# Patient Record
Sex: FEMALE | Race: WHITE | NOT HISPANIC OR LATINO | URBAN - METROPOLITAN AREA
[De-identification: names, ages, dates, MRNs, and addresses within clinical notes are randomized per-mention and may not be internally consistent; named-entity substitution may affect disease eponyms.]

---

## 2017-05-11 ENCOUNTER — OUTPATIENT (OUTPATIENT)
Dept: OUTPATIENT SERVICES | Facility: HOSPITAL | Age: 67
LOS: 1 days | Discharge: HOME | End: 2017-05-11

## 2017-06-19 ENCOUNTER — OUTPATIENT (OUTPATIENT)
Dept: OUTPATIENT SERVICES | Facility: HOSPITAL | Age: 67
LOS: 1 days | Discharge: HOME | End: 2017-06-19

## 2017-06-19 DIAGNOSIS — E78.00 PURE HYPERCHOLESTEROLEMIA, UNSPECIFIED: ICD-10-CM

## 2017-06-19 DIAGNOSIS — I25.10 ATHEROSCLEROTIC HEART DISEASE OF NATIVE CORONARY ARTERY WITHOUT ANGINA PECTORIS: ICD-10-CM

## 2017-06-19 DIAGNOSIS — I67.1 CEREBRAL ANEURYSM, NONRUPTURED: ICD-10-CM

## 2017-06-19 DIAGNOSIS — Z72.0 TOBACCO USE: ICD-10-CM

## 2017-06-19 DIAGNOSIS — F41.9 ANXIETY DISORDER, UNSPECIFIED: ICD-10-CM

## 2017-06-19 DIAGNOSIS — R07.9 CHEST PAIN, UNSPECIFIED: ICD-10-CM

## 2017-06-28 DIAGNOSIS — F31.32 BIPOLAR DISORDER, CURRENT EPISODE DEPRESSED, MODERATE: ICD-10-CM

## 2017-07-07 ENCOUNTER — OUTPATIENT (OUTPATIENT)
Dept: OUTPATIENT SERVICES | Facility: HOSPITAL | Age: 67
LOS: 1 days | Discharge: HOME | End: 2017-07-07

## 2017-07-07 DIAGNOSIS — Z72.0 TOBACCO USE: ICD-10-CM

## 2017-07-07 DIAGNOSIS — E78.00 PURE HYPERCHOLESTEROLEMIA, UNSPECIFIED: ICD-10-CM

## 2017-07-07 DIAGNOSIS — F41.9 ANXIETY DISORDER, UNSPECIFIED: ICD-10-CM

## 2017-07-07 DIAGNOSIS — F31.32 BIPOLAR DISORDER, CURRENT EPISODE DEPRESSED, MODERATE: ICD-10-CM

## 2017-07-07 DIAGNOSIS — R07.9 CHEST PAIN, UNSPECIFIED: ICD-10-CM

## 2017-07-07 DIAGNOSIS — I67.1 CEREBRAL ANEURYSM, NONRUPTURED: ICD-10-CM

## 2017-07-07 DIAGNOSIS — I25.10 ATHEROSCLEROTIC HEART DISEASE OF NATIVE CORONARY ARTERY WITHOUT ANGINA PECTORIS: ICD-10-CM

## 2017-07-26 ENCOUNTER — OUTPATIENT (OUTPATIENT)
Dept: OUTPATIENT SERVICES | Facility: HOSPITAL | Age: 67
LOS: 1 days | Discharge: HOME | End: 2017-07-26

## 2017-07-26 DIAGNOSIS — R07.9 CHEST PAIN, UNSPECIFIED: ICD-10-CM

## 2017-07-26 DIAGNOSIS — E78.00 PURE HYPERCHOLESTEROLEMIA, UNSPECIFIED: ICD-10-CM

## 2017-07-26 DIAGNOSIS — I67.1 CEREBRAL ANEURYSM, NONRUPTURED: ICD-10-CM

## 2017-07-26 DIAGNOSIS — Z72.0 TOBACCO USE: ICD-10-CM

## 2017-07-26 DIAGNOSIS — F41.9 ANXIETY DISORDER, UNSPECIFIED: ICD-10-CM

## 2017-07-26 DIAGNOSIS — I25.10 ATHEROSCLEROTIC HEART DISEASE OF NATIVE CORONARY ARTERY WITHOUT ANGINA PECTORIS: ICD-10-CM

## 2017-08-10 ENCOUNTER — OUTPATIENT (OUTPATIENT)
Dept: OUTPATIENT SERVICES | Facility: HOSPITAL | Age: 67
LOS: 1 days | Discharge: HOME | End: 2017-08-10

## 2017-08-10 DIAGNOSIS — I67.1 CEREBRAL ANEURYSM, NONRUPTURED: ICD-10-CM

## 2017-08-10 DIAGNOSIS — E78.00 PURE HYPERCHOLESTEROLEMIA, UNSPECIFIED: ICD-10-CM

## 2017-08-10 DIAGNOSIS — F41.9 ANXIETY DISORDER, UNSPECIFIED: ICD-10-CM

## 2017-08-10 DIAGNOSIS — R07.9 CHEST PAIN, UNSPECIFIED: ICD-10-CM

## 2017-08-10 DIAGNOSIS — Z72.0 TOBACCO USE: ICD-10-CM

## 2017-08-10 DIAGNOSIS — F31.32 BIPOLAR DISORDER, CURRENT EPISODE DEPRESSED, MODERATE: ICD-10-CM

## 2017-08-10 DIAGNOSIS — I25.10 ATHEROSCLEROTIC HEART DISEASE OF NATIVE CORONARY ARTERY WITHOUT ANGINA PECTORIS: ICD-10-CM

## 2017-09-11 ENCOUNTER — EMERGENCY (EMERGENCY)
Facility: HOSPITAL | Age: 67
LOS: 0 days | Discharge: HOME | End: 2017-09-11

## 2017-09-11 DIAGNOSIS — Z90.10 ACQUIRED ABSENCE OF UNSPECIFIED BREAST AND NIPPLE: ICD-10-CM

## 2017-09-11 DIAGNOSIS — E78.00 PURE HYPERCHOLESTEROLEMIA, UNSPECIFIED: ICD-10-CM

## 2017-09-11 DIAGNOSIS — R07.9 CHEST PAIN, UNSPECIFIED: ICD-10-CM

## 2017-09-11 DIAGNOSIS — I67.1 CEREBRAL ANEURYSM, NONRUPTURED: ICD-10-CM

## 2017-09-11 DIAGNOSIS — Z86.73 PERSONAL HISTORY OF TRANSIENT ISCHEMIC ATTACK (TIA), AND CEREBRAL INFARCTION WITHOUT RESIDUAL DEFICITS: ICD-10-CM

## 2017-09-11 DIAGNOSIS — I25.10 ATHEROSCLEROTIC HEART DISEASE OF NATIVE CORONARY ARTERY WITHOUT ANGINA PECTORIS: ICD-10-CM

## 2017-09-11 DIAGNOSIS — F41.9 ANXIETY DISORDER, UNSPECIFIED: ICD-10-CM

## 2017-09-11 DIAGNOSIS — Z87.891 PERSONAL HISTORY OF NICOTINE DEPENDENCE: ICD-10-CM

## 2017-09-11 DIAGNOSIS — Z72.0 TOBACCO USE: ICD-10-CM

## 2017-09-11 DIAGNOSIS — R10.30 LOWER ABDOMINAL PAIN, UNSPECIFIED: ICD-10-CM

## 2017-09-11 DIAGNOSIS — E11.9 TYPE 2 DIABETES MELLITUS WITHOUT COMPLICATIONS: ICD-10-CM

## 2017-09-11 DIAGNOSIS — Z79.82 LONG TERM (CURRENT) USE OF ASPIRIN: ICD-10-CM

## 2017-09-11 DIAGNOSIS — F32.9 MAJOR DEPRESSIVE DISORDER, SINGLE EPISODE, UNSPECIFIED: ICD-10-CM

## 2017-09-11 DIAGNOSIS — I10 ESSENTIAL (PRIMARY) HYPERTENSION: ICD-10-CM

## 2017-09-11 DIAGNOSIS — Z79.899 OTHER LONG TERM (CURRENT) DRUG THERAPY: ICD-10-CM

## 2017-10-05 ENCOUNTER — EMERGENCY (EMERGENCY)
Facility: HOSPITAL | Age: 67
LOS: 0 days | Discharge: HOME | End: 2017-10-05

## 2017-10-05 DIAGNOSIS — E11.9 TYPE 2 DIABETES MELLITUS WITHOUT COMPLICATIONS: ICD-10-CM

## 2017-10-05 DIAGNOSIS — Z86.73 PERSONAL HISTORY OF TRANSIENT ISCHEMIC ATTACK (TIA), AND CEREBRAL INFARCTION WITHOUT RESIDUAL DEFICITS: ICD-10-CM

## 2017-10-05 DIAGNOSIS — F41.9 ANXIETY DISORDER, UNSPECIFIED: ICD-10-CM

## 2017-10-05 DIAGNOSIS — Z87.891 PERSONAL HISTORY OF NICOTINE DEPENDENCE: ICD-10-CM

## 2017-10-05 DIAGNOSIS — Z90.10 ACQUIRED ABSENCE OF UNSPECIFIED BREAST AND NIPPLE: ICD-10-CM

## 2017-10-05 DIAGNOSIS — F32.9 MAJOR DEPRESSIVE DISORDER, SINGLE EPISODE, UNSPECIFIED: ICD-10-CM

## 2017-10-05 DIAGNOSIS — R56.9 UNSPECIFIED CONVULSIONS: ICD-10-CM

## 2017-10-05 DIAGNOSIS — R07.9 CHEST PAIN, UNSPECIFIED: ICD-10-CM

## 2017-10-05 DIAGNOSIS — I25.10 ATHEROSCLEROTIC HEART DISEASE OF NATIVE CORONARY ARTERY WITHOUT ANGINA PECTORIS: ICD-10-CM

## 2017-10-05 DIAGNOSIS — R10.32 LEFT LOWER QUADRANT PAIN: ICD-10-CM

## 2017-10-05 DIAGNOSIS — E78.00 PURE HYPERCHOLESTEROLEMIA, UNSPECIFIED: ICD-10-CM

## 2017-10-05 DIAGNOSIS — K57.32 DIVERTICULITIS OF LARGE INTESTINE WITHOUT PERFORATION OR ABSCESS WITHOUT BLEEDING: ICD-10-CM

## 2017-10-05 DIAGNOSIS — Z79.899 OTHER LONG TERM (CURRENT) DRUG THERAPY: ICD-10-CM

## 2017-10-05 DIAGNOSIS — Z79.82 LONG TERM (CURRENT) USE OF ASPIRIN: ICD-10-CM

## 2017-10-05 DIAGNOSIS — Z90.710 ACQUIRED ABSENCE OF BOTH CERVIX AND UTERUS: ICD-10-CM

## 2017-10-05 DIAGNOSIS — Z72.0 TOBACCO USE: ICD-10-CM

## 2017-10-05 DIAGNOSIS — I67.1 CEREBRAL ANEURYSM, NONRUPTURED: ICD-10-CM

## 2017-10-05 DIAGNOSIS — I10 ESSENTIAL (PRIMARY) HYPERTENSION: ICD-10-CM

## 2017-10-06 ENCOUNTER — EMERGENCY (EMERGENCY)
Facility: HOSPITAL | Age: 67
LOS: 0 days | Discharge: HOME | End: 2017-10-07

## 2017-10-06 DIAGNOSIS — I10 ESSENTIAL (PRIMARY) HYPERTENSION: ICD-10-CM

## 2017-10-06 DIAGNOSIS — Z72.0 TOBACCO USE: ICD-10-CM

## 2017-10-06 DIAGNOSIS — E78.00 PURE HYPERCHOLESTEROLEMIA, UNSPECIFIED: ICD-10-CM

## 2017-10-06 DIAGNOSIS — Z87.891 PERSONAL HISTORY OF NICOTINE DEPENDENCE: ICD-10-CM

## 2017-10-06 DIAGNOSIS — Z79.899 OTHER LONG TERM (CURRENT) DRUG THERAPY: ICD-10-CM

## 2017-10-06 DIAGNOSIS — R07.9 CHEST PAIN, UNSPECIFIED: ICD-10-CM

## 2017-10-06 DIAGNOSIS — I25.10 ATHEROSCLEROTIC HEART DISEASE OF NATIVE CORONARY ARTERY WITHOUT ANGINA PECTORIS: ICD-10-CM

## 2017-10-06 DIAGNOSIS — F41.8 OTHER SPECIFIED ANXIETY DISORDERS: ICD-10-CM

## 2017-10-06 DIAGNOSIS — I67.1 CEREBRAL ANEURYSM, NONRUPTURED: ICD-10-CM

## 2017-10-06 DIAGNOSIS — Z90.10 ACQUIRED ABSENCE OF UNSPECIFIED BREAST AND NIPPLE: ICD-10-CM

## 2017-10-06 DIAGNOSIS — R10.32 LEFT LOWER QUADRANT PAIN: ICD-10-CM

## 2017-10-06 DIAGNOSIS — E11.9 TYPE 2 DIABETES MELLITUS WITHOUT COMPLICATIONS: ICD-10-CM

## 2017-10-06 DIAGNOSIS — K57.92 DIVERTICULITIS OF INTESTINE, PART UNSPECIFIED, WITHOUT PERFORATION OR ABSCESS WITHOUT BLEEDING: ICD-10-CM

## 2017-10-06 DIAGNOSIS — Z79.2 LONG TERM (CURRENT) USE OF ANTIBIOTICS: ICD-10-CM

## 2017-10-06 DIAGNOSIS — F41.9 ANXIETY DISORDER, UNSPECIFIED: ICD-10-CM

## 2017-10-06 DIAGNOSIS — Z79.82 LONG TERM (CURRENT) USE OF ASPIRIN: ICD-10-CM

## 2017-10-09 ENCOUNTER — EMERGENCY (EMERGENCY)
Facility: HOSPITAL | Age: 67
LOS: 0 days | Discharge: LEFT BEFORE DISCHARGE | End: 2017-10-09

## 2017-10-09 DIAGNOSIS — Z72.0 TOBACCO USE: ICD-10-CM

## 2017-10-09 DIAGNOSIS — I25.10 ATHEROSCLEROTIC HEART DISEASE OF NATIVE CORONARY ARTERY WITHOUT ANGINA PECTORIS: ICD-10-CM

## 2017-10-09 DIAGNOSIS — R10.32 LEFT LOWER QUADRANT PAIN: ICD-10-CM

## 2017-10-09 DIAGNOSIS — Z79.899 OTHER LONG TERM (CURRENT) DRUG THERAPY: ICD-10-CM

## 2017-10-09 DIAGNOSIS — E78.00 PURE HYPERCHOLESTEROLEMIA, UNSPECIFIED: ICD-10-CM

## 2017-10-09 DIAGNOSIS — I10 ESSENTIAL (PRIMARY) HYPERTENSION: ICD-10-CM

## 2017-10-09 DIAGNOSIS — Z87.891 PERSONAL HISTORY OF NICOTINE DEPENDENCE: ICD-10-CM

## 2017-10-09 DIAGNOSIS — E11.9 TYPE 2 DIABETES MELLITUS WITHOUT COMPLICATIONS: ICD-10-CM

## 2017-10-09 DIAGNOSIS — Z79.2 LONG TERM (CURRENT) USE OF ANTIBIOTICS: ICD-10-CM

## 2017-10-09 DIAGNOSIS — Z79.82 LONG TERM (CURRENT) USE OF ASPIRIN: ICD-10-CM

## 2017-10-09 DIAGNOSIS — R07.9 CHEST PAIN, UNSPECIFIED: ICD-10-CM

## 2017-10-09 DIAGNOSIS — Z79.84 LONG TERM (CURRENT) USE OF ORAL HYPOGLYCEMIC DRUGS: ICD-10-CM

## 2017-10-09 DIAGNOSIS — R19.7 DIARRHEA, UNSPECIFIED: ICD-10-CM

## 2017-10-09 DIAGNOSIS — Z98.890 OTHER SPECIFIED POSTPROCEDURAL STATES: ICD-10-CM

## 2017-10-09 DIAGNOSIS — F41.9 ANXIETY DISORDER, UNSPECIFIED: ICD-10-CM

## 2017-10-09 DIAGNOSIS — I67.1 CEREBRAL ANEURYSM, NONRUPTURED: ICD-10-CM

## 2018-02-23 DIAGNOSIS — F31.32 BIPOLAR DISORDER, CURRENT EPISODE DEPRESSED, MODERATE: ICD-10-CM

## 2018-04-11 ENCOUNTER — EMERGENCY (EMERGENCY)
Facility: HOSPITAL | Age: 68
LOS: 0 days | Discharge: HOME | End: 2018-04-12
Attending: EMERGENCY MEDICINE

## 2018-04-11 VITALS
SYSTOLIC BLOOD PRESSURE: 137 MMHG | TEMPERATURE: 96 F | OXYGEN SATURATION: 95 % | RESPIRATION RATE: 18 BRPM | HEART RATE: 73 BPM | DIASTOLIC BLOOD PRESSURE: 90 MMHG | WEIGHT: 214.95 LBS | HEIGHT: 72 IN

## 2018-04-11 DIAGNOSIS — R42 DIZZINESS AND GIDDINESS: ICD-10-CM

## 2018-04-11 DIAGNOSIS — Z90.710 ACQUIRED ABSENCE OF BOTH CERVIX AND UTERUS: ICD-10-CM

## 2018-04-11 DIAGNOSIS — Z98.890 OTHER SPECIFIED POSTPROCEDURAL STATES: Chronic | ICD-10-CM

## 2018-04-11 DIAGNOSIS — F17.210 NICOTINE DEPENDENCE, CIGARETTES, UNCOMPLICATED: ICD-10-CM

## 2018-04-11 DIAGNOSIS — Z90.12 ACQUIRED ABSENCE OF LEFT BREAST AND NIPPLE: ICD-10-CM

## 2018-04-11 LAB
ALBUMIN SERPL ELPH-MCNC: 4.3 G/DL — SIGNIFICANT CHANGE UP (ref 3.5–5.2)
ALP SERPL-CCNC: 68 U/L — SIGNIFICANT CHANGE UP (ref 30–115)
ALT FLD-CCNC: 33 U/L — SIGNIFICANT CHANGE UP (ref 0–41)
ANION GAP SERPL CALC-SCNC: 16 MMOL/L — HIGH (ref 7–14)
APTT BLD: 30 SEC — SIGNIFICANT CHANGE UP (ref 27–39.2)
AST SERPL-CCNC: 33 U/L — SIGNIFICANT CHANGE UP (ref 0–41)
BASOPHILS # BLD AUTO: 0.05 K/UL — SIGNIFICANT CHANGE UP (ref 0–0.2)
BASOPHILS NFR BLD AUTO: 0.5 % — SIGNIFICANT CHANGE UP (ref 0–1)
BILIRUB SERPL-MCNC: 0.4 MG/DL — SIGNIFICANT CHANGE UP (ref 0.2–1.2)
BUN SERPL-MCNC: 14 MG/DL — SIGNIFICANT CHANGE UP (ref 10–20)
CALCIUM SERPL-MCNC: 9.3 MG/DL — SIGNIFICANT CHANGE UP (ref 8.5–10.1)
CHLORIDE SERPL-SCNC: 102 MMOL/L — SIGNIFICANT CHANGE UP (ref 98–110)
CO2 SERPL-SCNC: 25 MMOL/L — SIGNIFICANT CHANGE UP (ref 17–32)
CREAT SERPL-MCNC: 0.7 MG/DL — SIGNIFICANT CHANGE UP (ref 0.7–1.5)
EOSINOPHIL # BLD AUTO: 0.26 K/UL — SIGNIFICANT CHANGE UP (ref 0–0.7)
EOSINOPHIL NFR BLD AUTO: 2.8 % — SIGNIFICANT CHANGE UP (ref 0–8)
GLUCOSE SERPL-MCNC: 196 MG/DL — HIGH (ref 70–99)
HCT VFR BLD CALC: 43.1 % — SIGNIFICANT CHANGE UP (ref 37–47)
HGB BLD-MCNC: 14.4 G/DL — SIGNIFICANT CHANGE UP (ref 12–16)
IMM GRANULOCYTES NFR BLD AUTO: 0.7 % — HIGH (ref 0.1–0.3)
INR BLD: 1.1 RATIO — SIGNIFICANT CHANGE UP (ref 0.65–1.3)
LIDOCAIN IGE QN: 19 U/L — SIGNIFICANT CHANGE UP (ref 7–60)
LYMPHOCYTES # BLD AUTO: 2.17 K/UL — SIGNIFICANT CHANGE UP (ref 1.2–3.4)
LYMPHOCYTES # BLD AUTO: 23.2 % — SIGNIFICANT CHANGE UP (ref 20.5–51.1)
MCHC RBC-ENTMCNC: 30.3 PG — SIGNIFICANT CHANGE UP (ref 27–31)
MCHC RBC-ENTMCNC: 33.4 G/DL — SIGNIFICANT CHANGE UP (ref 32–37)
MCV RBC AUTO: 90.7 FL — SIGNIFICANT CHANGE UP (ref 81–99)
MONOCYTES # BLD AUTO: 0.56 K/UL — SIGNIFICANT CHANGE UP (ref 0.1–0.6)
MONOCYTES NFR BLD AUTO: 6 % — SIGNIFICANT CHANGE UP (ref 1.7–9.3)
NEUTROPHILS # BLD AUTO: 6.23 K/UL — SIGNIFICANT CHANGE UP (ref 1.4–6.5)
NEUTROPHILS NFR BLD AUTO: 66.8 % — SIGNIFICANT CHANGE UP (ref 42.2–75.2)
NRBC # BLD: 0 /100 WBCS — SIGNIFICANT CHANGE UP (ref 0–0)
PLATELET # BLD AUTO: 136 K/UL — SIGNIFICANT CHANGE UP (ref 130–400)
POTASSIUM SERPL-MCNC: 4.2 MMOL/L — SIGNIFICANT CHANGE UP (ref 3.5–5)
POTASSIUM SERPL-SCNC: 4.2 MMOL/L — SIGNIFICANT CHANGE UP (ref 3.5–5)
PROT SERPL-MCNC: 6.9 G/DL — SIGNIFICANT CHANGE UP (ref 6–8)
PROTHROM AB SERPL-ACNC: 11.9 SEC — SIGNIFICANT CHANGE UP (ref 9.95–12.87)
RBC # BLD: 4.75 M/UL — SIGNIFICANT CHANGE UP (ref 4.2–5.4)
RBC # FLD: 13.8 % — SIGNIFICANT CHANGE UP (ref 11.5–14.5)
SODIUM SERPL-SCNC: 143 MMOL/L — SIGNIFICANT CHANGE UP (ref 135–146)
TROPONIN T SERPL-MCNC: <0.01 NG/ML — SIGNIFICANT CHANGE UP
WBC # BLD: 9.34 K/UL — SIGNIFICANT CHANGE UP (ref 4.8–10.8)
WBC # FLD AUTO: 9.34 K/UL — SIGNIFICANT CHANGE UP (ref 4.8–10.8)

## 2018-04-11 RX ORDER — MECLIZINE HCL 12.5 MG
25 TABLET ORAL ONCE
Qty: 0 | Refills: 0 | Status: COMPLETED | OUTPATIENT
Start: 2018-04-11 | End: 2018-04-11

## 2018-04-11 RX ADMIN — Medication 25 MILLIGRAM(S): at 21:43

## 2018-04-11 NOTE — ED PROVIDER NOTE - OBJECTIVE STATEMENT
this is 66 yo male presents to ed for evaluation of dizziness with nausea and vomiting . patient states she is being followed by neurologist for frequent headaches  and had mri. patient went to neurologist today for follow up visit after her MRI. patient was told she had mild dementia and was put on medicine for dementia. patient states she took that medicine for first time tonight . about 1 hour after she started to sweat with nausea and then began vomiting. Vyclone call 911 . patient is denying a headache at this time. no chest pain no shortness of breath. no abdominal pain .

## 2018-04-11 NOTE — ED ADULT TRIAGE NOTE - CHIEF COMPLAINT QUOTE
pt started new medication ( Namzarix ) prescribed by neurologist , pt several hours later after eating chocolate started to throw up and got the spins. pt c/o of weakness.

## 2018-04-11 NOTE — ED PROVIDER NOTE - PROGRESS NOTE DETAILS
I personally evaluated the patient. I reviewed the Resident’s or Physician Assistant’s note (as assigned above), and agree with the findings and plan except as documented in my note.  67yF with hx of DM, Breast CA, left mastectomy presents to ED for dizziness with nausea and vomiting.  Pt states that while sitting on her couch she developed dizziness described as “being on a ship.”  Pt then developed feelings of nausea, tried to find a comfortable position and with moving dizziness worsened.  Pt then vomited multiple times, contacted emergency services.  Denies any CP, SOB, abd pain, LOC, headhace, numbness, tingling, neck pain.  ON EXAM:  Pt is well appearing, awake and alert.  PERRL, EOMI.  No nystagmus.  No schew.  CVS RRR.  Resp CTA b/l.  abd soft nt/nd.  No LE edema. PLAN:  Labs, EKG, CXray, CT head, IVF, analgesia, and re eval. Pt feels much better no longer dizzy   able  to  sit up  walk around  asymptomatic -  pt  wants to g elijah e-  pt has close follow up with her physician s and her neurologist   results  discussed and printed for patient -   urged to return to ED  for any new worsens ing  or concerning symptoms to her .  will dc in stabel condition Pt feels much better no longer dizzy   able  to  sit up  walk around  asymptomatic -  pt  wants to g elijah e-  pt has close follow up with her physician s and her neurologist   results  discussed and printed for patient -   urged to return to ED  for any new worsens ing  or concerning symptoms to her .  will dc in stabel condition (Pt is already aware of her previous  strokes -  as she has been seen and taken care of by her neurologist

## 2018-04-11 NOTE — ED PROVIDER NOTE - PHYSICAL EXAMINATION
--EXAM--  VITAL SIGNS: I have reviewed vs documented at present.  CONSTITUTIONAL: Well-developed; well-nourished; in no acute distress.   SKIN: Warm and dry, no acute rash.   HEAD: Normocephalic; atraumatic.  EYES: PERRL, EOM intact; conjunctiva and sclera clear. No nystagmus.  ENT: No nasal discharge; airway clear.  NECK: Supple; non tender.  CARD: S1, S2, Regular rate and rhythm.   RESP: No wheezes, rales or rhonchi.  ABD: Normal bowel sounds; soft; non-distended; non-tender.  EXT: Normal ROM.   NEURO: Alert, oriented, grossly unremarkable. Strength 5/5 in all extremities. Sensation intact throughout.  PSYCH: Cooperative, appropriate.

## 2018-04-11 NOTE — ED PROVIDER NOTE - NS ED ROS FT
Review of Systems:  	•	CONSTITUTIONAL - no fever, no diaphoresis, no chills  	•	SKIN - no rash  	•	HEMATOLOGIC - no bleeding, no bruising  	•	EYES - no eye pain, no blurry vision  	•	ENT - no change in hearing, no sore throat, no ear pain or tinnitus  	•	RESPIRATORY - no shortness of breath, no cough  	•	CARDIAC - no chest pain, no palpitations  	•	GI - no abd pain, no nausea, no vomiting, no diarrhea, no constipation  	•	GENITO-URINARY - no discharge, no dysuria; no hematuria, no increased urinary frequency  	•	MUSCULOSKELETAL - no joint paint, no swelling, no redness  	•	NEUROLOGIC -dizziness and weakness, no headache, no paresthesias, no LOC  	•	PSYCH - no anxiety, non suicidal, non homicidal, no hallucination, no depression

## 2018-04-12 VITALS
TEMPERATURE: 97 F | SYSTOLIC BLOOD PRESSURE: 140 MMHG | HEART RATE: 77 BPM | OXYGEN SATURATION: 96 % | RESPIRATION RATE: 18 BRPM | DIASTOLIC BLOOD PRESSURE: 84 MMHG

## 2018-04-12 RX ORDER — METOCLOPRAMIDE HCL 10 MG
10 TABLET ORAL ONCE
Qty: 0 | Refills: 0 | Status: COMPLETED | OUTPATIENT
Start: 2018-04-12 | End: 2018-04-12

## 2018-04-12 RX ORDER — ASPIRIN/CALCIUM CARB/MAGNESIUM 324 MG
325 TABLET ORAL ONCE
Qty: 0 | Refills: 0 | Status: COMPLETED | OUTPATIENT
Start: 2018-04-12 | End: 2018-04-12

## 2018-04-12 RX ADMIN — Medication 10 MILLIGRAM(S): at 00:17

## 2018-04-12 NOTE — ED ADULT NURSE REASSESSMENT NOTE - NS ED NURSE REASSESS COMMENT FT1
Pt vomit episode x1 on her way back from CT testing; medication given as per MD orders; will continue to assess.

## 2019-01-01 ENCOUNTER — INPATIENT (INPATIENT)
Facility: HOSPITAL | Age: 69
LOS: 16 days | Discharge: NURSING, REHAB OR LONG-TERM CARE | End: 2019-01-18
Attending: INTERNAL MEDICINE | Admitting: INTERNAL MEDICINE

## 2019-01-01 VITALS — WEIGHT: 199.96 LBS

## 2019-01-01 DIAGNOSIS — F03.90 UNSPECIFIED DEMENTIA WITHOUT BEHAVIORAL DISTURBANCE: ICD-10-CM

## 2019-01-01 DIAGNOSIS — D64.9 ANEMIA, UNSPECIFIED: ICD-10-CM

## 2019-01-01 DIAGNOSIS — Z98.890 OTHER SPECIFIED POSTPROCEDURAL STATES: Chronic | ICD-10-CM

## 2019-01-01 DIAGNOSIS — I67.1 CEREBRAL ANEURYSM, NONRUPTURED: ICD-10-CM

## 2019-01-01 DIAGNOSIS — F32.9 MAJOR DEPRESSIVE DISORDER, SINGLE EPISODE, UNSPECIFIED: ICD-10-CM

## 2019-01-01 DIAGNOSIS — R56.9 UNSPECIFIED CONVULSIONS: ICD-10-CM

## 2019-01-01 DIAGNOSIS — C50.912 MALIGNANT NEOPLASM OF UNSPECIFIED SITE OF LEFT FEMALE BREAST: ICD-10-CM

## 2019-01-01 DIAGNOSIS — E78.5 HYPERLIPIDEMIA, UNSPECIFIED: ICD-10-CM

## 2019-01-01 PROBLEM — C50.919 MALIGNANT NEOPLASM OF UNSPECIFIED SITE OF UNSPECIFIED FEMALE BREAST: Chronic | Status: ACTIVE | Noted: 2018-04-11

## 2019-01-01 LAB
ALBUMIN SERPL ELPH-MCNC: 3.9 G/DL — SIGNIFICANT CHANGE UP (ref 3.5–5.2)
ALLERGY+IMMUNOLOGY DIAG STUDY NOTE: SIGNIFICANT CHANGE UP
ALP SERPL-CCNC: 70 U/L — SIGNIFICANT CHANGE UP (ref 30–115)
ALT FLD-CCNC: 13 U/L — SIGNIFICANT CHANGE UP (ref 0–41)
ANION GAP SERPL CALC-SCNC: 10 MMOL/L — SIGNIFICANT CHANGE UP (ref 7–14)
ANISOCYTOSIS BLD QL: SIGNIFICANT CHANGE UP
AST SERPL-CCNC: 27 U/L — SIGNIFICANT CHANGE UP (ref 0–41)
BASOPHILS # BLD AUTO: 0 K/UL — SIGNIFICANT CHANGE UP (ref 0–0.2)
BASOPHILS NFR BLD AUTO: 0 % — SIGNIFICANT CHANGE UP (ref 0–1)
BILIRUB SERPL-MCNC: 0.3 MG/DL — SIGNIFICANT CHANGE UP (ref 0.2–1.2)
BUN SERPL-MCNC: 10 MG/DL — SIGNIFICANT CHANGE UP (ref 10–20)
CALCIUM SERPL-MCNC: 9.4 MG/DL — SIGNIFICANT CHANGE UP (ref 8.5–10.1)
CHLORIDE SERPL-SCNC: 103 MMOL/L — SIGNIFICANT CHANGE UP (ref 98–110)
CO2 SERPL-SCNC: 27 MMOL/L — SIGNIFICANT CHANGE UP (ref 17–32)
CREAT SERPL-MCNC: 0.7 MG/DL — SIGNIFICANT CHANGE UP (ref 0.7–1.5)
EOSINOPHIL # BLD AUTO: 0.07 K/UL — SIGNIFICANT CHANGE UP (ref 0–0.7)
EOSINOPHIL NFR BLD AUTO: 1 % — SIGNIFICANT CHANGE UP (ref 0–8)
GLUCOSE SERPL-MCNC: 195 MG/DL — HIGH (ref 70–99)
HCT VFR BLD CALC: 22 % — LOW (ref 37–47)
HGB BLD-MCNC: 5.9 G/DL — CRITICAL LOW (ref 12–16)
HYPOCHROMIA BLD QL: SIGNIFICANT CHANGE UP
LIDOCAIN IGE QN: 17 U/L — SIGNIFICANT CHANGE UP (ref 7–60)
LYMPHOCYTES # BLD AUTO: 1 K/UL — LOW (ref 1.2–3.4)
LYMPHOCYTES # BLD AUTO: 15 % — LOW (ref 20.5–51.1)
MANUAL SMEAR VERIFICATION: SIGNIFICANT CHANGE UP
MCHC RBC-ENTMCNC: 19.5 PG — LOW (ref 27–31)
MCHC RBC-ENTMCNC: 26.8 G/DL — LOW (ref 32–37)
MCV RBC AUTO: 72.6 FL — LOW (ref 81–99)
MICROCYTES BLD QL: SIGNIFICANT CHANGE UP
MONOCYTES # BLD AUTO: 0.33 K/UL — SIGNIFICANT CHANGE UP (ref 0.1–0.6)
MONOCYTES NFR BLD AUTO: 5 % — SIGNIFICANT CHANGE UP (ref 1.7–9.3)
NEUTROPHILS # BLD AUTO: 5.27 K/UL — SIGNIFICANT CHANGE UP (ref 1.4–6.5)
NEUTROPHILS NFR BLD AUTO: 78 % — HIGH (ref 42.2–75.2)
NEUTS BAND # BLD: 1 % — SIGNIFICANT CHANGE UP (ref 0–6)
NRBC # BLD: 0 /100 — SIGNIFICANT CHANGE UP (ref 0–0)
NRBC # BLD: SIGNIFICANT CHANGE UP /100 WBCS (ref 0–0)
PLAT MORPH BLD: NORMAL — SIGNIFICANT CHANGE UP
PLATELET # BLD AUTO: 157 K/UL — SIGNIFICANT CHANGE UP (ref 130–400)
POTASSIUM SERPL-MCNC: 4.2 MMOL/L — SIGNIFICANT CHANGE UP (ref 3.5–5)
POTASSIUM SERPL-SCNC: 4.2 MMOL/L — SIGNIFICANT CHANGE UP (ref 3.5–5)
PROT SERPL-MCNC: 6.8 G/DL — SIGNIFICANT CHANGE UP (ref 6–8)
RBC # BLD: 3.03 M/UL — LOW (ref 4.2–5.4)
RBC # FLD: 18 % — HIGH (ref 11.5–14.5)
RBC BLD AUTO: ABNORMAL
SODIUM SERPL-SCNC: 140 MMOL/L — SIGNIFICANT CHANGE UP (ref 135–146)
TROPONIN T SERPL-MCNC: <0.01 NG/ML — SIGNIFICANT CHANGE UP
TYPE + AB SCN PNL BLD: SIGNIFICANT CHANGE UP
WBC # BLD: 6.67 K/UL — SIGNIFICANT CHANGE UP (ref 4.8–10.8)
WBC # FLD AUTO: 6.67 K/UL — SIGNIFICANT CHANGE UP (ref 4.8–10.8)

## 2019-01-01 RX ORDER — ESCITALOPRAM OXALATE 10 MG/1
10 TABLET, FILM COATED ORAL DAILY
Qty: 0 | Refills: 0 | Status: DISCONTINUED | OUTPATIENT
Start: 2019-01-01 | End: 2019-01-18

## 2019-01-01 RX ORDER — LAMOTRIGINE 25 MG/1
100 TABLET, ORALLY DISINTEGRATING ORAL
Qty: 0 | Refills: 0 | Status: DISCONTINUED | OUTPATIENT
Start: 2019-01-01 | End: 2019-01-18

## 2019-01-01 RX ORDER — SIMVASTATIN 20 MG/1
20 TABLET, FILM COATED ORAL AT BEDTIME
Qty: 0 | Refills: 0 | Status: DISCONTINUED | OUTPATIENT
Start: 2019-01-01 | End: 2019-01-18

## 2019-01-01 RX ORDER — SODIUM CHLORIDE 9 MG/ML
1000 INJECTION INTRAMUSCULAR; INTRAVENOUS; SUBCUTANEOUS ONCE
Qty: 0 | Refills: 0 | Status: COMPLETED | OUTPATIENT
Start: 2019-01-01 | End: 2019-01-01

## 2019-01-01 RX ORDER — SODIUM CHLORIDE 9 MG/ML
1000 INJECTION INTRAMUSCULAR; INTRAVENOUS; SUBCUTANEOUS
Qty: 0 | Refills: 0 | Status: DISCONTINUED | OUTPATIENT
Start: 2019-01-01 | End: 2019-01-06

## 2019-01-01 RX ORDER — QUETIAPINE FUMARATE 200 MG/1
25 TABLET, FILM COATED ORAL AT BEDTIME
Qty: 0 | Refills: 0 | Status: DISCONTINUED | OUTPATIENT
Start: 2019-01-01 | End: 2019-01-18

## 2019-01-01 RX ORDER — MEMANTINE HYDROCHLORIDE 10 MG/1
10 TABLET ORAL
Qty: 0 | Refills: 0 | Status: DISCONTINUED | OUTPATIENT
Start: 2019-01-01 | End: 2019-01-18

## 2019-01-01 RX ADMIN — SODIUM CHLORIDE 1000 MILLILITER(S): 9 INJECTION INTRAMUSCULAR; INTRAVENOUS; SUBCUTANEOUS at 11:13

## 2019-01-01 RX ADMIN — QUETIAPINE FUMARATE 25 MILLIGRAM(S): 200 TABLET, FILM COATED ORAL at 22:02

## 2019-01-01 RX ADMIN — SODIUM CHLORIDE 75 MILLILITER(S): 9 INJECTION INTRAMUSCULAR; INTRAVENOUS; SUBCUTANEOUS at 22:05

## 2019-01-01 RX ADMIN — LAMOTRIGINE 100 MILLIGRAM(S): 25 TABLET, ORALLY DISINTEGRATING ORAL at 18:05

## 2019-01-01 RX ADMIN — MEMANTINE HYDROCHLORIDE 10 MILLIGRAM(S): 10 TABLET ORAL at 18:05

## 2019-01-01 RX ADMIN — SIMVASTATIN 20 MILLIGRAM(S): 20 TABLET, FILM COATED ORAL at 22:02

## 2019-01-01 NOTE — ED ADULT NURSE REASSESSMENT NOTE - NS ED NURSE REASSESS COMMENT FT1
Pt has been admitted to med/surg for further observation/treatment. Pt needs blood transfusion for Hgb 5.9/Hct 22.0. Two units of PRBCs have been ordered. Blood is ready but patient has bed available on [address], room 431-1. Verbal report has been given to receiving RNBrook. Pt will receive blood once she arrives on the unit. Dr. Ina david as well as Medical DALI Hill. Pt is hemodynamically stable with blood pressure 118/59; Heart rate 84. Await arrival of transport.

## 2019-01-01 NOTE — H&P ADULT - ASSESSMENT
67 yo female admitted after possible syncope vs seizure, found on floor by daughter                  D/W Dr Stanford

## 2019-01-01 NOTE — H&P ADULT - HISTORY OF PRESENT ILLNESS
67 yo female with h/o Dementia who lives alone presents after daughter found her on bathroom floor. As per pts daughter she seemed more confused and had some slurred speech when she found her. She was moaning but not complaining of any specific pain.     Daughter has limited information on pts hx because she has only started speaking to her in the last 2 months, but had been astranged for 7 years prior. Pt lived with her  who abandoned her in august of this year as per pts daughter.     Pt now offers no complaints. Denies pain, n/v, weakness, CP, SOB, dizziness or any bleeding.

## 2019-01-01 NOTE — ED PROVIDER NOTE - PMH
Brain aneurysm    Breast cancer in female    Dementia    Depression, unspecified depression type    Hyperlipidemia, unspecified hyperlipidemia type    Seizures

## 2019-01-01 NOTE — H&P ADULT - ATTENDING COMMENTS
pt seen and examined independently, I have read and agree with above exam and poa,    pt with possible syncope or seizure      anemis    Gi eval    neuro eval    EEg

## 2019-01-01 NOTE — ED PROVIDER NOTE - PHYSICAL EXAMINATION
CONST: Well appearing in NAD  EYES: PERRL, EOMI, Sclera and conjunctiva clear.  ENT: No nasal discharge. TM's clear B/L without drainage. Oropharynx normal appearing, no erythema or exudates. No abscess or swelling. Uvula midline. No temporal artery or mastoid tenderness.  NECK: Non-tender, no meningeal signs.   CARD: Normal S1 S2; Normal rate and rhythm  RESP: Equal BS B/L, No wheezes, rhonchi or rales. No distress  GI: Soft, non-tender, non-distended. no cva tenderness. normal BS  RECTUM: brown stool, no blood  MS: Normal ROM in all extremities. No midline spinal tenderness. pulses 2 +. no calf tenderness or swelling  SKIN: Warm, dry, no acute rashes. Good turgor  NEURO: A&Ox2, No focal deficits. Strength 5/5 with no sensory deficits. Finger to nose intact. Negative pronator drift

## 2019-01-01 NOTE — ED PROVIDER NOTE - MEDICAL DECISION MAKING DETAILS
I was present for and supervised the key and critical aspects of the procedures performed during the care of the patient. Patient cannot contribute to history at this time secondary to dementia she was found by her daughter "passed out" on the bathroom floor the patient was at baseline menta status by the time the patient's daughter reached her.  Now she is back to baseline on physical exam she is nc/at perrla eomi oropharynx clear cta b/l, rrr s1s2 noted abd-soft nt nd bs+ ext from with no focal deficits though she is baseline confused.  patient is pale appearing.  rectal reveals brown stool no active bleeding.    A/P- patient was found to be anemic requiring transfusion which was initiated. Patient admitted for further management

## 2019-01-01 NOTE — ED PROVIDER NOTE - OBJECTIVE STATEMENT
68 year old female with pmhx noted, brought in by daughter. as per daughter, pt was on the floor in her home, yelling that she felt dizzy. daughter admits watches her on cameras at the house, saw she was in the bathroom for a while, so drove to house and found her on floor. Pt admits feels "run down", but no other complaints currently. No V/D, fever, chills, CP, or SOB.

## 2019-01-01 NOTE — H&P ADULT - NSHPPHYSICALEXAM_GEN_ALL_CORE
T(C): 37.1 (01-01-19 @ 14:02), Max: 37.2 (01-01-19 @ 11:59)  HR: 84 (01-01-19 @ 14:02) (70 - 84)  BP: 118/59 (01-01-19 @ 14:02) (118/59 - 133/60)  RR: 20 (01-01-19 @ 14:02) (18 - 20)  SpO2: 95% (01-01-19 @ 14:02) (91% - 95%)    T(C): 37.1 (01-01-19 @ 14:02), Max: 37.2 (01-01-19 @ 11:59)  HR: 84 (01-01-19 @ 14:02) (70 - 84)  BP: 118/59 (01-01-19 @ 14:02) (118/59 - 133/60)  RR: 20 (01-01-19 @ 14:02) (18 - 20)  SpO2: 95% (01-01-19 @ 14:02) (91% - 95%)    PHYSICAL EXAM:  GENERAL: NAD, well-developed, well nourished, looks stated age  HEAD:  Atraumatic, Normocephalic  EYES: EOMI, PERRLA, conjunctiva and sclera clear  NECK: Supple, No JVD, no bruits, no masses, no thyroid enlargement  ENMT: No tonsillar erythema, exudated or enlargement, moist mucous membranes  CHEST/LUNG: Clear to auscultation bilaterally; No rales, rhonchi or wheezing, no dullness to percussion  HEART: S1,S2 Regular rate and rhythm; No murmurs, rubs, or gallops  ABDOMEN: Soft, nontender, nondistended, no rebound tenderness; No palpable masses, no hernias, no organomegaly; Bowel sounds present and normoactive  EXTREMITIES:  2+ peripheral pulses bilaterally and symmetrically, no clubbing, cyanosis, or edema  LYMPH: No lymphadenopathy  PSYCH: AAOx3, normal mood and affect  NEUROLOGY: non-focal, muscle strength 5/5 all extremities, DTRs 2+ symmetrically  SKIN: No rashes, lesions or ecchymosis noted

## 2019-01-01 NOTE — ED ADULT NURSE NOTE - NSIMPLEMENTINTERV_GEN_ALL_ED
Implemented All Fall with Harm Risk Interventions:  [place] to call system. Call bell, personal items and telephone within reach. Instruct patient to call for assistance. Room bathroom lighting operational. Non-slip footwear when patient is off stretcher. Physically safe environment: no spills, clutter or unnecessary equipment. Stretcher in lowest position, wheels locked, appropriate side rails in place. Provide visual cue, wrist band, yellow gown, etc. Monitor gait and stability. Monitor for mental status changes and reorient to person, place, and time. Review medications for side effects contributing to fall risk. Reinforce activity limits and safety measures with patient and family. Provide visual clues: red socks.

## 2019-01-01 NOTE — H&P ADULT - NSHPLABSRESULTS_GEN_ALL_CORE
5.9    6.67  )-----------( 157      ( 01 Jan 2019 09:51 )             22.0       01-01    140  |  103  |  10  ----------------------------<  195<H>  4.2   |  27  |  0.7    Ca    9.4      01 Jan 2019 09:51    TPro  6.8  /  Alb  3.9  /  TBili  0.3  /  DBili  x   /  AST  27  /  ALT  13  /  AlkPhos  70  01-01                          Lactate Trend      CARDIAC MARKERS ( 01 Jan 2019 09:51 )  x     / <0.01 ng/mL / x     / x     / x 5.9    6.67  )-----------( 157      ( 01 Jan 2019 09:51 )             22.0       01-01    140  |  103  |  10  ----------------------------<  195<H>  4.2   |  27  |  0.7    Ca    9.4      01 Jan 2019 09:51    TPro  6.8  /  Alb  3.9  /  TBili  0.3  /  DBili  x   /  AST  27  /  ALT  13  /  AlkPhos  70  01-01      < from: CT Head No Cont (01.01.19 @ 10:36) >    IMPRESSION:     1.  No evidence of acute intracranial pathology.  Stable exam since   4/11/2018.    2.  Stable severe chronic microvascular changes and chronic left   cerebellar infarct..                  MARILYNN MCKEON M.D., ATTENDING RADIOLOGIST  This document has been electronically signed. Jan 1 2019 10:48AM    < end of copied text >                              Lactate Trend      CARDIAC MARKERS ( 01 Jan 2019 09:51 )  x     / <0.01 ng/mL / x     / x     / x

## 2019-01-01 NOTE — ED ADULT TRIAGE NOTE - CHIEF COMPLAINT QUOTE
biba for unwitnessed fall at home.  Pt states she passed out.  hx dementia .  pt denies hitting head.

## 2019-01-01 NOTE — ED PROVIDER NOTE - ATTENDING CONTRIBUTION TO CARE
as at baseline menta status by the time the patient's daughter reached her.  Now she is back to baseline on physical exam she is nc/at perrla eomi oropharynx clear cta b/l, rrr s1s2 noted abd-soft nt nd bs+ ext from with no focal deficits though she is baseline confused.  patient is pale appearing.  rectal reveals brown stool no active bleeding.    A/P- patient was found to be anemic requiring transfusion which was initiated. Patient admitted for further management

## 2019-01-02 LAB
ANION GAP SERPL CALC-SCNC: 11 MMOL/L — SIGNIFICANT CHANGE UP (ref 7–14)
APPEARANCE UR: ABNORMAL
BACTERIA # UR AUTO: ABNORMAL
BILIRUB UR-MCNC: NEGATIVE — SIGNIFICANT CHANGE UP
BUN SERPL-MCNC: 9 MG/DL — LOW (ref 10–20)
CALCIUM SERPL-MCNC: 8.8 MG/DL — SIGNIFICANT CHANGE UP (ref 8.5–10.1)
CHLORIDE SERPL-SCNC: 103 MMOL/L — SIGNIFICANT CHANGE UP (ref 98–110)
CO2 SERPL-SCNC: 27 MMOL/L — SIGNIFICANT CHANGE UP (ref 17–32)
COLOR SPEC: YELLOW — SIGNIFICANT CHANGE UP
COMMENT - URINE: SIGNIFICANT CHANGE UP
CREAT SERPL-MCNC: 0.7 MG/DL — SIGNIFICANT CHANGE UP (ref 0.7–1.5)
DIFF PNL FLD: NEGATIVE — SIGNIFICANT CHANGE UP
EPI CELLS # UR: ABNORMAL /HPF
GLUCOSE SERPL-MCNC: 166 MG/DL — HIGH (ref 70–99)
GLUCOSE UR QL: NEGATIVE MG/DL — SIGNIFICANT CHANGE UP
HCT VFR BLD CALC: 25.2 % — LOW (ref 37–47)
HCT VFR BLD CALC: 25.3 % — LOW (ref 37–47)
HGB BLD-MCNC: 7.2 G/DL — CRITICAL LOW (ref 12–16)
HGB BLD-MCNC: 7.3 G/DL — CRITICAL LOW (ref 12–16)
KETONES UR-MCNC: NEGATIVE — SIGNIFICANT CHANGE UP
LEUKOCYTE ESTERASE UR-ACNC: NEGATIVE — SIGNIFICANT CHANGE UP
MANUAL DIF COMMENT BLD-IMP: SIGNIFICANT CHANGE UP
MCHC RBC-ENTMCNC: 21.1 PG — LOW (ref 27–31)
MCHC RBC-ENTMCNC: 21.2 PG — LOW (ref 27–31)
MCHC RBC-ENTMCNC: 28.5 G/DL — LOW (ref 32–37)
MCHC RBC-ENTMCNC: 29 G/DL — LOW (ref 32–37)
MCV RBC AUTO: 73 FL — LOW (ref 81–99)
MCV RBC AUTO: 74.2 FL — LOW (ref 81–99)
NITRITE UR-MCNC: NEGATIVE — SIGNIFICANT CHANGE UP
NRBC # BLD: 0 /100 WBCS — SIGNIFICANT CHANGE UP (ref 0–0)
NRBC # BLD: 0 /100 WBCS — SIGNIFICANT CHANGE UP (ref 0–0)
PH UR: 6.5 — SIGNIFICANT CHANGE UP (ref 5–8)
PLATELET # BLD AUTO: 162 K/UL — SIGNIFICANT CHANGE UP (ref 130–400)
PLATELET # BLD AUTO: 173 K/UL — SIGNIFICANT CHANGE UP (ref 130–400)
POTASSIUM SERPL-MCNC: 4.3 MMOL/L — SIGNIFICANT CHANGE UP (ref 3.5–5)
POTASSIUM SERPL-SCNC: 4.3 MMOL/L — SIGNIFICANT CHANGE UP (ref 3.5–5)
PROT UR-MCNC: NEGATIVE MG/DL — SIGNIFICANT CHANGE UP
RBC # BLD: 3.41 M/UL — LOW (ref 4.2–5.4)
RBC # BLD: 3.45 M/UL — LOW (ref 4.2–5.4)
RBC # FLD: 18.2 % — HIGH (ref 11.5–14.5)
RBC # FLD: 18.5 % — HIGH (ref 11.5–14.5)
RBC CASTS # UR COMP ASSIST: SIGNIFICANT CHANGE UP /HPF
SODIUM SERPL-SCNC: 141 MMOL/L — SIGNIFICANT CHANGE UP (ref 135–146)
SP GR SPEC: 1.02 — SIGNIFICANT CHANGE UP (ref 1.01–1.03)
UROBILINOGEN FLD QL: 0.2 MG/DL — SIGNIFICANT CHANGE UP (ref 0.2–0.2)
WBC # BLD: 7.11 K/UL — SIGNIFICANT CHANGE UP (ref 4.8–10.8)
WBC # BLD: 7.75 K/UL — SIGNIFICANT CHANGE UP (ref 4.8–10.8)
WBC # FLD AUTO: 7.11 K/UL — SIGNIFICANT CHANGE UP (ref 4.8–10.8)
WBC # FLD AUTO: 7.75 K/UL — SIGNIFICANT CHANGE UP (ref 4.8–10.8)
WBC UR QL: SIGNIFICANT CHANGE UP /HPF

## 2019-01-02 RX ORDER — HALOPERIDOL DECANOATE 100 MG/ML
2 INJECTION INTRAMUSCULAR ONCE
Qty: 0 | Refills: 0 | Status: COMPLETED | OUTPATIENT
Start: 2019-01-02 | End: 2019-01-02

## 2019-01-02 RX ORDER — PANTOPRAZOLE SODIUM 20 MG/1
40 TABLET, DELAYED RELEASE ORAL
Qty: 0 | Refills: 0 | Status: DISCONTINUED | OUTPATIENT
Start: 2019-01-02 | End: 2019-01-18

## 2019-01-02 RX ORDER — HALOPERIDOL DECANOATE 100 MG/ML
1 INJECTION INTRAMUSCULAR ONCE
Qty: 0 | Refills: 0 | Status: DISCONTINUED | OUTPATIENT
Start: 2019-01-02 | End: 2019-01-02

## 2019-01-02 RX ORDER — HALOPERIDOL DECANOATE 100 MG/ML
1 INJECTION INTRAMUSCULAR ONCE
Qty: 0 | Refills: 0 | Status: COMPLETED | OUTPATIENT
Start: 2019-01-02 | End: 2019-01-02

## 2019-01-02 RX ADMIN — LAMOTRIGINE 100 MILLIGRAM(S): 25 TABLET, ORALLY DISINTEGRATING ORAL at 17:34

## 2019-01-02 RX ADMIN — SODIUM CHLORIDE 75 MILLILITER(S): 9 INJECTION INTRAMUSCULAR; INTRAVENOUS; SUBCUTANEOUS at 05:06

## 2019-01-02 RX ADMIN — HALOPERIDOL DECANOATE 2 MILLIGRAM(S): 100 INJECTION INTRAMUSCULAR at 22:01

## 2019-01-02 RX ADMIN — MEMANTINE HYDROCHLORIDE 10 MILLIGRAM(S): 10 TABLET ORAL at 05:06

## 2019-01-02 RX ADMIN — PANTOPRAZOLE SODIUM 40 MILLIGRAM(S): 20 TABLET, DELAYED RELEASE ORAL at 17:34

## 2019-01-02 RX ADMIN — MEMANTINE HYDROCHLORIDE 10 MILLIGRAM(S): 10 TABLET ORAL at 17:34

## 2019-01-02 RX ADMIN — LAMOTRIGINE 100 MILLIGRAM(S): 25 TABLET, ORALLY DISINTEGRATING ORAL at 05:06

## 2019-01-02 RX ADMIN — ESCITALOPRAM OXALATE 10 MILLIGRAM(S): 10 TABLET, FILM COATED ORAL at 11:14

## 2019-01-02 NOTE — CONSULT NOTE ADULT - SUBJECTIVE AND OBJECTIVE BOX
Neurology Consultation note    Name  SONYA YI    HPI:  69 yo female with h/o Dementia who lives alone presents after daughter found her on bathroom floor. As per pts daughter she seemed more confused and had some slurred speech when she found her. She was moaning but not complaining of any specific pain.     Daughter has limited information on pts hx because she has only started speaking to her in the last 2 months, but had been astranged for 7 years prior. Pt lived with her  who abandoned her in august of this year as per pts daughter.     Pt now offers no complaints. Denies pain, n/v, weakness, CP, SOB, dizziness or any bleeding. (01 Jan 2019 14:22)      NEURO:  PATIENT IS A 69 YO FEMALE WITH HX OF Brain aneurysm  ,Breast cancer ,Dementia, ANEMIA AND SZ D/O ON LAMICTAL  FOUND ON THE FLOOR BY DAUGHTER. DETAILS OF FALL UNKNOWN. PATIENT WAS CONFUSED AFTER THE EVENT  AND REMAINS CONFUSED TODAY  UNABLE TO GIVE DETAILED HX   DAUGHTER'S  HX LIMITED AS WELL        Vital Signs Last 24 Hrs  T(C): 36.8 (02 Jan 2019 06:10), Max: 37.3 (01 Jan 2019 17:54)  T(F): 98.3 (02 Jan 2019 06:10), Max: 99.2 (01 Jan 2019 17:54)  HR: 70 (02 Jan 2019 06:10) (70 - 87)  BP: 131/59 (02 Jan 2019 06:10) (116/58 - 136/62)  BP(mean): --  RR: 16 (02 Jan 2019 06:10) (16 - 20)  SpO2: 95% (01 Jan 2019 14:02) (95% - 95%)    Neurological Exam:   MS: AWAKE AND ALERT , ORIENTED TO SELF ONLY, NO LANGUAGE DYSFUNCTION  LACKS INSIGHT INTO VISIT  Cranial nerves: Pupils equally round and reactive to light, visual fields full, no nystagmus, extraocular muscles intact, V1 through V3 intact bilaterally and symmetric, face symmetric, hearing intact to finger rub, palate elevation symmetric, tongue was midline.  Motor:  MRC grading 5/5 b/l UE/LE.    Sensation: Intact to light touch, proprioception, and pinprick.   Coordination: No dysmetria on finger-to-nose    Medications  escitalopram 10 milliGRAM(s) Oral daily  lamoTRIgine 100 milliGRAM(s) Oral two times a day  memantine 10 milliGRAM(s) Oral two times a day  pantoprazole    Tablet 40 milliGRAM(s) Oral two times a day  QUEtiapine 25 milliGRAM(s) Oral at bedtime  simvastatin 20 milliGRAM(s) Oral at bedtime  sodium chloride 0.9%. 1000 milliLiter(s) IV Continuous <Continuous>      Lab  01-02    141  |  103  |  9<L>  ----------------------------<  166<H>  4.3   |  27  |  0.7    Ca    8.8      02 Jan 2019 07:35    TPro  6.8  /  Alb  3.9  /  TBili  0.3  /  DBili  x   /  AST  27  /  ALT  13  /  AlkPhos  70  01-01                          7.3    7.11  )-----------( 162      ( 02 Jan 2019 07:35 )             25.2     LIVER FUNCTIONS - ( 01 Jan 2019 09:51 )  Alb: 3.9 g/dL / Pro: 6.8 g/dL / ALK PHOS: 70 U/L / ALT: 13 U/L / AST: 27 U/L / GGT: x                   Radiology      Assessment: PATIENT IS A 69 YO FEMALE WITH THE ABOVE HX P/W EPISODE OF LOC  POSSIBLE SZ  PATIENT W/ BASELINE DEMENTIA, CONFUSED THIS AM BUT NON FOCAL  DETAILS OF EVENT NOT KNOWN  CTH NO ACUTE CHANGES    Plan:  REEG  CHECK LAMICTAL LEVELS  MRI BRAIN WWO GADO :?? IF PATIENT HAS ANY CONTRAINDICATIONS  CONT LAMICTAL 100 BID

## 2019-01-02 NOTE — CONSULT NOTE ADULT - ASSESSMENT
The patient is a 68F with a history of dementia , and breast cancer, who we were asked to see for anemia.  On admission her Hb was 5.9 with microcytic indices.  She received a two unit blood transfusion and now her Hb is 7.3.  She was found at home on the floor confused and with slurred speech by her daughter.  Her dtr and herself had been estranged by recently started talking again over the past two months.  Her nurse here and the aide at the bedside do not report any bleeding or pain.  The patient who is alert today but oriented only x 1 reports black stool every three BMs but no abdominal pain, nausea, vomiting, hematemesis, hematochezia, diarrhea or constipation.  The patient has epigastric tenderness and RUQ tenderness on exam to a mild degree.  I discussed endoscopic work up with the patient but she is refusing it at the present time.  Recommendations:  Neurologic evaluation to assess mental status.  Pantoprazole 40 mg po BID for possible ulcer disease.  Endoscopy and colonoscopy were recommended however patient refused.  Her mental status however is oriented only x 1 therefore we will return to see if she changes her mind.  Order iron, TIBC, ferritin, B12, folate.  Transfuse to a Hb of 8.  Stool hemoccult.  Discussed with DALI Espinosa.

## 2019-01-02 NOTE — CONSULT NOTE ADULT - SUBJECTIVE AND OBJECTIVE BOX
Chief Complaint: Found on floor at home by Dtr and admitted with slurred speech and confusion.    History of Present Illness:  The patient is a 68F with a history of dementia , and breast cancer, who we were asked to see for anemia.  On admission her Hb was 5.9 with microcytic indices.  She received a two unit blood transfusion and now her Hb is 7.3.  She was found at home on the floor confused and with slurred speech by her daughter.  Her dtr and herself had been estranged by recently started talking again over the past two months.  Her nurse here and the aide at the bedside do not report any bleeding or pain.  The patient who is alert today but oriented only x 1 reports black stool every three BMs but no abdominal pain, nausea, vomiting, hematemesis, hematochezia, diarrhea or constipation.      PAST MEDICAL & SURGICAL HISTORY:  Brain aneurysm  Seizures  Depression, unspecified depression type  Hyperlipidemia, unspecified hyperlipidemia type  Dementia  Breast cancer in female  H/O abdominal hysterectomy  H/O mastectomy, left      Home Medications:  escitalopram 10 mg oral tablet: 1 tab(s) orally once a day (01 Jan 2019 14:19)  lamoTRIgine 100 mg oral tablet: 1 tab(s) orally 2 times a day (01 Jan 2019 14:19)  memantine 10 mg oral tablet: 1 tab(s) orally 2 times a day (01 Jan 2019 14:19)  QUEtiapine 25 mg oral tablet: once a day at night. (01 Jan 2019 14:19)  simvastatin 20 mg oral tablet: 1 tab(s) orally once a day (at bedtime) (01 Jan 2019 14:19)      MEDICATIONS  (STANDING):  escitalopram 10 milliGRAM(s) Oral daily  lamoTRIgine 100 milliGRAM(s) Oral two times a day  memantine 10 milliGRAM(s) Oral two times a day  QUEtiapine 25 milliGRAM(s) Oral at bedtime  simvastatin 20 milliGRAM(s) Oral at bedtime  sodium chloride 0.9%. 1000 milliLiter(s) (75 mL/Hr) IV Continuous <Continuous>    MEDICATIONS  (PRN):      Allergies    No Known Allergies    Intolerances        FAMILY HISTORY:  No pertinent family history in first degree relatives      Social History:    Physical Exam:  Vital Signs Last 24 Hrs  T(C): 36.8 (02 Jan 2019 06:10), Max: 37.3 (01 Jan 2019 17:54)  T(F): 98.3 (02 Jan 2019 06:10), Max: 99.2 (01 Jan 2019 17:54)  HR: 70 (02 Jan 2019 06:10) (70 - 87)  BP: 131/59 (02 Jan 2019 06:10) (116/58 - 136/62)  BP(mean): --  RR: 16 (02 Jan 2019 06:10) (16 - 20)  SpO2: 95% (01 Jan 2019 14:02) (95% - 95%)  HEENT:          Anicteric, neck supple, no JVD  Chest:            No rales, rhonchi, or wheezes.  Full breath sounds.  Cardiac:         RRR.  No S3/S4.  Abdomen:     Soft, tenderness is appreciated in the epigastrium and RUQ, non-distended, normoactive bowel sounds.  Extremities:  No edema.  Neurologic:   Alert and oriented x 1.    Laboratories:    CBC Full  -  ( 02 Jan 2019 07:35 )  WBC Count : 7.11 K/uL  Hemoglobin : 7.3 g/dL  Hematocrit : 25.2 %  Platelet Count - Automated : 162 K/uL  Mean Cell Volume : 73.0 fL  Mean Cell Hemoglobin : 21.2 pg  Mean Cell Hemoglobin Concentration : 29.0 g/dL  Auto Neutrophil # : x  Auto Lymphocyte # : x  Auto Monocyte # : x  Auto Eosinophil # : x  Auto Basophil # : x  Auto Neutrophil % : x  Auto Lymphocyte % : x  Auto Monocyte % : x  Auto Eosinophil % : x  Auto Basophil % : x    01-02    141  |  103  |  9<L>  ----------------------------<  166<H>  4.3   |  27  |  0.7    Ca    8.8      02 Jan 2019 07:35    TPro  6.8  /  Alb  3.9  /  TBili  0.3  /  DBili  x   /  AST  27  /  ALT  13  /  AlkPhos  70  01-01          Radiologic Imaging:

## 2019-01-03 LAB
ANION GAP SERPL CALC-SCNC: 8 MMOL/L — SIGNIFICANT CHANGE UP (ref 7–14)
BUN SERPL-MCNC: 9 MG/DL — LOW (ref 10–20)
CALCIUM SERPL-MCNC: 9.1 MG/DL — SIGNIFICANT CHANGE UP (ref 8.5–10.1)
CHLORIDE SERPL-SCNC: 105 MMOL/L — SIGNIFICANT CHANGE UP (ref 98–110)
CO2 SERPL-SCNC: 28 MMOL/L — SIGNIFICANT CHANGE UP (ref 17–32)
CREAT SERPL-MCNC: 0.8 MG/DL — SIGNIFICANT CHANGE UP (ref 0.7–1.5)
CULTURE RESULTS: SIGNIFICANT CHANGE UP
FERRITIN SERPL-MCNC: 15 NG/ML — SIGNIFICANT CHANGE UP (ref 15–150)
FOLATE SERPL-MCNC: 12 NG/ML — SIGNIFICANT CHANGE UP
GLUCOSE SERPL-MCNC: 186 MG/DL — HIGH (ref 70–99)
HCT VFR BLD CALC: 25.3 % — LOW (ref 37–47)
HGB BLD-MCNC: 7.2 G/DL — CRITICAL LOW (ref 12–16)
MCHC RBC-ENTMCNC: 21 PG — LOW (ref 27–31)
MCHC RBC-ENTMCNC: 28.5 G/DL — LOW (ref 32–37)
MCV RBC AUTO: 73.8 FL — LOW (ref 81–99)
NRBC # BLD: 0 /100 WBCS — SIGNIFICANT CHANGE UP (ref 0–0)
PLATELET # BLD AUTO: 173 K/UL — SIGNIFICANT CHANGE UP (ref 130–400)
POTASSIUM SERPL-MCNC: 4.1 MMOL/L — SIGNIFICANT CHANGE UP (ref 3.5–5)
POTASSIUM SERPL-SCNC: 4.1 MMOL/L — SIGNIFICANT CHANGE UP (ref 3.5–5)
RBC # BLD: 3.43 M/UL — LOW (ref 4.2–5.4)
RBC # FLD: 18.9 % — HIGH (ref 11.5–14.5)
SODIUM SERPL-SCNC: 141 MMOL/L — SIGNIFICANT CHANGE UP (ref 135–146)
SPECIMEN SOURCE: SIGNIFICANT CHANGE UP
VIT B12 SERPL-MCNC: 481 PG/ML — SIGNIFICANT CHANGE UP (ref 232–1245)
WBC # BLD: 7.46 K/UL — SIGNIFICANT CHANGE UP (ref 4.8–10.8)
WBC # FLD AUTO: 7.46 K/UL — SIGNIFICANT CHANGE UP (ref 4.8–10.8)

## 2019-01-03 RX ADMIN — MEMANTINE HYDROCHLORIDE 10 MILLIGRAM(S): 10 TABLET ORAL at 05:18

## 2019-01-03 RX ADMIN — QUETIAPINE FUMARATE 25 MILLIGRAM(S): 200 TABLET, FILM COATED ORAL at 22:09

## 2019-01-03 RX ADMIN — MEMANTINE HYDROCHLORIDE 10 MILLIGRAM(S): 10 TABLET ORAL at 22:09

## 2019-01-03 RX ADMIN — PANTOPRAZOLE SODIUM 40 MILLIGRAM(S): 20 TABLET, DELAYED RELEASE ORAL at 05:18

## 2019-01-03 RX ADMIN — SIMVASTATIN 20 MILLIGRAM(S): 20 TABLET, FILM COATED ORAL at 22:09

## 2019-01-03 RX ADMIN — LAMOTRIGINE 100 MILLIGRAM(S): 25 TABLET, ORALLY DISINTEGRATING ORAL at 22:09

## 2019-01-03 RX ADMIN — PANTOPRAZOLE SODIUM 40 MILLIGRAM(S): 20 TABLET, DELAYED RELEASE ORAL at 22:09

## 2019-01-03 RX ADMIN — LAMOTRIGINE 100 MILLIGRAM(S): 25 TABLET, ORALLY DISINTEGRATING ORAL at 05:18

## 2019-01-03 NOTE — PROGRESS NOTE ADULT - SUBJECTIVE AND OBJECTIVE BOX
Patient was seen and examined. Spoke with RN. Chart reviewed.    No events overnight.  Vital Signs Last 24 Hrs  T(F): 97.6 (2019 05:21), Max: 98.3 (2019 14:00)  HR: 75 (2019 05:21) (75 - 77)  BP: 134/62 (2019 05:21) (110/59 - 134/62)  SpO2: --  MEDICATIONS  (STANDING):  escitalopram 10 milliGRAM(s) Oral daily  lamoTRIgine 100 milliGRAM(s) Oral two times a day  memantine 10 milliGRAM(s) Oral two times a day  pantoprazole    Tablet 40 milliGRAM(s) Oral two times a day  QUEtiapine 25 milliGRAM(s) Oral at bedtime  simvastatin 20 milliGRAM(s) Oral at bedtime  sodium chloride 0.9%. 1000 milliLiter(s) (75 mL/Hr) IV Continuous <Continuous>    MEDICATIONS  (PRN):    Labs:                        7.2    7.46  )-----------( 173      ( 2019 07:26 )             25.3                         7.2    7.75  )-----------( 173      ( 2019 19:12 )             25.3     2019 07:26    141    |  105    |  9      ----------------------------<  186    4.1     |  28     |  0.8    2019 07:35    141    |  103    |  9      ----------------------------<  166    4.3     |  27     |  0.7      Ca    9.1        2019 07:26  Ca    8.8        2019 07:35        Urinalysis Basic - ( 2019 23:50 )    Color: Yellow / Appearance: Slightly Cloudy / S.020 / pH: x  Gluc: x / Ketone: Negative  / Bili: Negative / Urobili: 0.2 mg/dL   Blood: x / Protein: Negative mg/dL / Nitrite: Negative   Leuk Esterase: Negative / RBC: 1-2 /HPF / WBC 1-2 /HPF   Sq Epi: x / Non Sq Epi: Moderate /HPF / Bacteria: Few          Radiology:    General: comfortable, NAD  Neurology: A&Ox3, nonfocal  Head:  Normocephalic, atraumatic  ENT:  Mucosa moist, no ulcerations  Neck:  Supple, no JVD,   Skin: no breakdown  Resp: CTA B/L  CV: RRR, S1S2,   GI: Soft, NT, bowel sounds  MS: No edema, + peripheral pulses, FROM all 4 extremity

## 2019-01-03 NOTE — PROGRESS NOTE ADULT - SUBJECTIVE AND OBJECTIVE BOX
68yFemale  Being followed for Anemia and intermittent melenic stools  Interval history:67 yo female pmh of breast cancer, dementia, depression, seizures Patient states she has been having black stools for 20 years. Patient notes her last bowel movement was 3 days ago and it was black. Patient denies nausea, vomiting, abdominal pain, diarrhea, constipation, dysphagia, pyrosis, hematemesis +intermittent melenic stools. Patient states she had a colonoscopy about 15 years ago that was reportedly normal.      PMHX/PSHX:  PAST MEDICAL & SURGICAL HISTORY:  Brain aneurysm  Seizures  Depression, unspecified depression type  Hyperlipidemia, unspecified hyperlipidemia type  Dementia  Breast cancer in female  H/O abdominal hysterectomy  H/O mastectomy, left  day        Social History: + smoking one pack cigarettes per day for at least 20 years . No alcohol. No illegal drug use.          MEDICATIONS:  MEDICATIONS  (STANDING):  escitalopram 10 milliGRAM(s) Oral daily  lamoTRIgine 100 milliGRAM(s) Oral two times a day  memantine 10 milliGRAM(s) Oral two times a day  pantoprazole    Tablet 40 milliGRAM(s) Oral two times a day  QUEtiapine 25 milliGRAM(s) Oral at bedtime  simvastatin 20 milliGRAM(s) Oral at bedtime  sodium chloride 0.9%. 1000 milliLiter(s) (75 mL/Hr) IV Continuous <Continuous>          Allergies:  No Known Allergies    Intolerances          REVIEW OF SYSTEMS:  General:  No  fevers, or chills.  Eyes:  No reported pain or visual changes  ENT:  No sore throat or runny nose.  NECK: No stiffness   CV:  No chest pain or palpitations.  Resp:  No shortness of breath, cough  GI:  No abdominal pain, nausea, vomiting, dysphagia, diarrhea or constipation. No hematemesis +Melenic stools for 20 years as per patient  Muscle:  No aches or weakness  Neuro:  No tingling, numbness   Heme:  No ecchymosis or easy bruisability        VITAL SIGNS:   T(F): 97.6 (01-03-19 @ 05:21), Max: 98.3 (01-02-19 @ 14:00)  HR: 75 (01-03-19 @ 05:21) (75 - 77)  BP: 134/62 (01-03-19 @ 05:21) (110/59 - 134/62)  RR: 16 (01-02-19 @ 14:00) (16 - 16)  SpO2: --    PHYSICAL EXAM:  GENERAL: AAOx3 patient was able to tell me her name and where she is and what month were in,, no acute distress.  HEAD:  Atraumatic, Normocephalic  EYES: conjunctiva and sclera clear  NECK: Supple, no JVD or thyromegaly  CHEST/LUNG: Clear to auscultation bilaterally; No wheeze, rhonchi, or rales  HEART: Regular rate and rhythm; normal S1, S2, No murmurs.  ABDOMEN: Soft, nontender, nondistended; Bowel sounds present, no abdominal bruit, masses, or hepatosplenomegaly  NEUROLOGY: No asterixis or tremor.   SKIN: Intact, no jaundice  Rectal-Patient refused at bedside             LABS:                        7.2    7.46  )-----------( 173      ( 03 Jan 2019 07:26 )             25.3     01-03    141  |  105  |  9<L>  ----------------------------<  186<H>  4.1   |  28  |  0.8    Ca    9.1      03 Jan 2019 07:26    TPro  6.8  /  Alb  3.9  /  TBili  0.3  /  DBili  x   /  AST  27  /  ALT  13  /  AlkPhos  70  01-01    LIVER FUNCTIONS - ( 01 Jan 2019 09:51 )  Alb: 3.9 g/dL / Pro: 6.8 g/dL / ALK PHOS: 70 U/L / ALT: 13 U/L / AST: 27 U/L / GGT: x               IMAGING: 68yFemale  Being followed for Anemia and intermittent melenic stools  Interval history:69 yo female pmh of breast cancer, dementia, depression, seizures Patient states she has been having black stools for 20 years. Patient notes her last bowel movement was 3 days ago and it was black. Patient denies nausea, vomiting, abdominal pain, diarrhea, constipation, dysphagia, pyrosis, hematemesis +intermittent melenic stools. Patient states she had a colonoscopy about 15 years ago that was reportedly normal.      PMHX/PSHX:  PAST MEDICAL & SURGICAL HISTORY:  Brain aneurysm  Seizures  Depression, unspecified depression type  Hyperlipidemia, unspecified hyperlipidemia type  Dementia  Breast cancer in female  H/O abdominal hysterectomy  H/O mastectomy, left  day        Social History: + smoking one pack cigarettes per day for at least 20 years . No alcohol. No illegal drug use.          MEDICATIONS:  MEDICATIONS  (STANDING):  escitalopram 10 milliGRAM(s) Oral daily  lamoTRIgine 100 milliGRAM(s) Oral two times a day  memantine 10 milliGRAM(s) Oral two times a day  pantoprazole    Tablet 40 milliGRAM(s) Oral two times a day  QUEtiapine 25 milliGRAM(s) Oral at bedtime  simvastatin 20 milliGRAM(s) Oral at bedtime  sodium chloride 0.9%. 1000 milliLiter(s) (75 mL/Hr) IV Continuous <Continuous>          Allergies:  No Known Allergies    Intolerances          REVIEW OF SYSTEMS:  General:  No  fevers, or chills.  Eyes:  No reported pain or visual changes  ENT:  No sore throat or runny nose.  NECK: No stiffness   CV:  No chest pain or palpitations.  Resp:  No shortness of breath, cough  GI:  No abdominal pain, nausea, vomiting, dysphagia, diarrhea or constipation. No hematemesis +Melenic stools for 20 years as per patient  Muscle:  No aches or weakness  Neuro:  No tingling, numbness         VITAL SIGNS:   T(F): 97.6 (01-03-19 @ 05:21), Max: 98.3 (01-02-19 @ 14:00)  HR: 75 (01-03-19 @ 05:21) (75 - 77)  BP: 134/62 (01-03-19 @ 05:21) (110/59 - 134/62)  RR: 16 (01-02-19 @ 14:00) (16 - 16)  SpO2: --    PHYSICAL EXAM:  GENERAL: AAOx3 patient was able to tell me her name and where she is and what month we are in,, no acute distress.  HEAD:  Atraumatic, Normocephalic  EYES: conjunctiva and sclera clear  NECK: Supple, no JVD or thyromegaly  CHEST/LUNG: Clear to auscultation bilaterally; No wheeze, rhonchi, or rales  HEART: Regular rate and rhythm; normal S1, S2, No murmurs.  ABDOMEN: Soft, nontender, nondistended; Bowel sounds present, no abdominal bruit, masses, or hepatosplenomegaly  NEUROLOGY: No asterixis or tremor.   SKIN: Intact, no jaundice  Rectal-Patient refused at bedside             LABS:                        7.2    7.46  )-----------( 173      ( 03 Jan 2019 07:26 )             25.3     01-03    141  |  105  |  9<L>  ----------------------------<  186<H>  4.1   |  28  |  0.8    Ca    9.1      03 Jan 2019 07:26    TPro  6.8  /  Alb  3.9  /  TBili  0.3  /  DBili  x   /  AST  27  /  ALT  13  /  AlkPhos  70  01-01    LIVER FUNCTIONS - ( 01 Jan 2019 09:51 )  Alb: 3.9 g/dL / Pro: 6.8 g/dL / ALK PHOS: 70 U/L / ALT: 13 U/L / AST: 27 U/L / GGT: x

## 2019-01-03 NOTE — PROVIDER CONTACT NOTE (OTHER) - ASSESSMENT
Pts daughter is power of , She is also health care proxy, she brought in all documents and they were photocopied and left in her chart this morning 1/3/19. Daughter is Ashley Neff [phone]. She also states that pt.s mental status is deteriorating and she is not able to make decisions for herself regarding her care.
md johnathon Espinosa made aware of pt's hg of 7.3, and gi reconnmendation of transfuse to keep hg at 8

## 2019-01-03 NOTE — CONSULT NOTE ADULT - ASSESSMENT
IMPRESSION: Rehab of 67 y/o  f  rehab  for  debility  anemia      PRECAUTIONS: [  ] Cardiac  [  ] Respiratory  [  ] Seizures [  ] Contact Isolation  [  ] Droplet Isolation  [  ] Other    Weight Bearing Status:     RECOMMENDATION:    Out of Bed to Chair     DVT/Decubiti Prophylaxis    REHAB PLAN:     [   ] Bedside P/T 3-5 times a week   [   ]   Bedside O/T  2-3 times a week             [ xx  ] No Rehab Therapy Indicated                   [   ]  Speech Therapy   Conditioning/ROM                                    ADL  Bed Mobility                                               Conditioning/ROM  Transfers                                                     Bed Mobility  Sitting /Standing Balance                         Transfers                                        Gait Training                                               Sitting/Standing Balance  Stair Training [   ]Applicable                    Home equipment Eval                                                                        Splinting  [   ] Only      GOALS:   ADL   [   ]   Independent                    Transfers  [   ] Independent                          Ambulation  [   ] Independent     [    ] With device                            [   ]  CG                                                         [   ]  CG                                                                  [   ] CG                            [    ] Min A                                                   [   ] Min A                                                              [   ] Min  A          DISCHARGE PLAN:   [   ]  Good candidate for Intensive Rehabilitation/Hospital based-[facility]                                             Will tolerate 3hrs Intensive Rehab Daily                                       [    ]  Short Term Rehab in Skilled Nursing Facility                                       [  xx  ]  Home with Outpatient or  services                                         [    ]  Possible Candidate for Intensive Hospital based Rehab

## 2019-01-03 NOTE — PHYSICAL THERAPY INITIAL EVALUATION ADULT - SPECIFY REASON(S)
Chart reviewed.  Patient with low hemoglobin (7.2).  Hold PT at this time, will follow up and complete PT IE as appropriate.

## 2019-01-03 NOTE — PROGRESS NOTE ADULT - ASSESSMENT
Assessment: PATIENT IS A 69 YO FEMALE WITH THE ABOVE HX P/W EPISODE OF LOC  POSSIBLE SZ  PATIENT W/ BASELINE DEMENTIA, CONFUSED THIS AM BUT NON FOCAL  DETAILS OF EVENT NOT KNOWN  CTH NO ACUTE CHANGES    Plan:  REEG  CHECK LAMICTAL LEVELS  MRI BRAIN WWO GADO :?? IF PATIENT HAS ANY CONTRAINDICATIONS  CONT LAMICTAL 100 BID   psych eval for capacity    gi bleed  Pantoprazole 40 mg po BID for possible ulcer disease.  Endoscopy and colonoscopy were recommended however patient refused.  Her mental status however is oriented only x 1 therefore we will return to see if she changes her mind.  Order iron, TIBC, ferritin, B12, folate.  Transfuse to a Hb of 8.  Stool hemoccult.

## 2019-01-03 NOTE — CONSULT NOTE ADULT - SUBJECTIVE AND OBJECTIVE BOX
HPI:  67 yo female with h/o Dementia who lives alone presents after daughter found her on bathroom floor. As per pts daughter she seemed more confused and had some slurred speech when she found her. She was moaning but not complaining of any specific pain.     Daughter has limited information on pts hx because she has only started speaking to her in the last 2 months, but had been astranged for 7 years prior. Pt lived with her  who abandoned her in august of this year as per pts daughter.     Pt now offers no complaints. Denies pain, n/v, weakness, CP, SOB, dizziness or any bleeding.     PTN  REFERRED TO ACUTE  REHAB  FOR  EVAL AND  TX   PAST MEDICAL & SURGICAL HISTORY:  Brain aneurysm  Seizures  Depression, unspecified depression type  Hyperlipidemia, unspecified hyperlipidemia type  Dementia  Breast cancer in female  H/O abdominal hysterectomy  H/O mastectomy, left      Hospital Course:    TODAY'S SUBJECTIVE & REVIEW OF SYMPTOMS:     Constitutional WNL   Cardio WNL   Resp WNL   GI WNL  Heme WNL  Endo WNL  Skin WNL  MSK WNL  Neuro WNL  Cognitive WNL  Psych WNL      MEDICATIONS  (STANDING):  escitalopram 10 milliGRAM(s) Oral daily  lamoTRIgine 100 milliGRAM(s) Oral two times a day  memantine 10 milliGRAM(s) Oral two times a day  pantoprazole    Tablet 40 milliGRAM(s) Oral two times a day  QUEtiapine 25 milliGRAM(s) Oral at bedtime  simvastatin 20 milliGRAM(s) Oral at bedtime  sodium chloride 0.9%. 1000 milliLiter(s) (75 mL/Hr) IV Continuous <Continuous>    MEDICATIONS  (PRN):  LORazepam   Injectable 2 milliGRAM(s) IV Push once PRN anxiety for mri      FAMILY HISTORY:  No pertinent family history in first degree relatives      Allergies    No Known Allergies    Intolerances        SOCIAL HISTORY:    [  ] Etoh  [  ] Smoking  [  ] Substance abuse     Home Environment:  [ x ] Home Alone  [  ] Lives with Family  [  ] Home Health Aid    Dwelling:  [  ] Apartment  [ x ] Private House  [  ] Adult Home  [  ] Skilled Nursing Facility      [  ] Short Term  [  ] Long Term  [x  ] Stairs       Elevator [  ]    FUNCTIONAL STATUS PTA: (Check all that apply)  Ambulation: [  x ]Independent    [  ] Dependent     [  ] Non-Ambulatory  Assistive Device: [  ] SA Cane  [  ]  Q Cane  [  ] Walker  [  ]  Wheelchair  ADL : [ x ] Independent  [  ]  Dependent       Vital Signs Last 24 Hrs  T(C): 36.7 (2019 14:00), Max: 36.7 (2019 14:00)  T(F): 98 (2019 14:00), Max: 98 (2019 14:00)  HR: 80 (2019 14:00) (75 - 80)  BP: 112/55 (2019 14:00) (112/55 - 134/62)  BP(mean): --  RR: 16 (2019 14:00) (16 - 16)  SpO2: --      PHYSICAL EXAM: Alert & Oriented X3  GENERAL: NAD, well-groomed, well-developed  HEAD:  Atraumatic, Normocephalic  EYES: EOMI, PERRLA, conjunctiva and sclera clear  NECK: Supple, No JVD, Normal thyroid  CHEST/LUNG: Clear to percussion bilaterally; No rales, rhonchi, wheezing, or rubs  HEART: Regular rate and rhythm; No murmurs, rubs, or gallops  ABDOMEN: Soft, Nontender, Nondistended; Bowel sounds present  EXTREMITIES:  2+ Peripheral Pulses, No clubbing, cyanosis, or edema    NERVOUS SYSTEM:  Cranial Nerves 2-12 intact [ x ] Abnormal  [  ]  ROM: WFL all extremities [ x ]  Abnormal [  ]  Motor Strength: WFL all extremities  [ x ]  Abnormal [  ]  Sensation: intact to light touch [ x ] Abnormal [  ]  Reflexes: Symmetric [x  ]  Abnormal [  ]    FUNCTIONAL STATUS:  Bed Mobility: Independent [x  ]  Supervision [  ]  Needs Assistance [  ]  N/A [  ]  Transfers: Independent [ x ]  Supervision [  ]  Needs Assistance [  ]  N/A [  ]   Ambulation: Independent [x  ]  Supervision [  ]  Needs Assistance [  ]  N/A [  ]  ADL: Independent [ x ] Requires Assistance [  ] N/A [  ]      LABS:                        7.2    7.46  )-----------( 173      ( 2019 07:26 )             25.3         141  |  105  |  9<L>  ----------------------------<  186<H>  4.1   |  28  |  0.8    Ca    9.1      2019 07:26        Urinalysis Basic - ( 2019 23:50 )    Color: Yellow / Appearance: Slightly Cloudy / S.020 / pH: x  Gluc: x / Ketone: Negative  / Bili: Negative / Urobili: 0.2 mg/dL   Blood: x / Protein: Negative mg/dL / Nitrite: Negative   Leuk Esterase: Negative / RBC: 1-2 /HPF / WBC 1-2 /HPF   Sq Epi: x / Non Sq Epi: Moderate /HPF / Bacteria: Few        RADIOLOGY & ADDITIONAL STUDIES:    Assesment:

## 2019-01-03 NOTE — PROGRESS NOTE ADULT - ASSESSMENT
69 yo female pmh of breast cancer, dementia, depression, seizures with    Problem1- Anemia and intermittent melenic stools    Problem 2- 67 yo female pmh of breast cancer, dementia, depression, seizures with    Problem1- Anemia and intermittent melenic stools  Will discuss further with attending   -Patient states she will think about endoscopic management. Patient seems like she will actually consider and endoscopy today versus yesterday where she did not want to consider it.    Problem 2-Slurred speech and confusion with history of seizures  -Neurology on board, care as per neurology team 69 yo female pmh of breast cancer, dementia, depression, seizures with    Problem1- Anemia and intermittent melenic stools  Rec  -Patient states she will think about endoscopic management  -Will revisit endoscopy after Neurology workup with patient's permission and consent as she will not consent to it now but states she might be open to it down the line    Problem 2-Slurred speech and confusion with history of seizures  Rec  -Neurology on board, care as per neurology team

## 2019-01-04 LAB
HCT VFR BLD CALC: 25.6 % — LOW (ref 37–47)
HGB BLD-MCNC: 7.2 G/DL — CRITICAL LOW (ref 12–16)
MCHC RBC-ENTMCNC: 20.9 PG — LOW (ref 27–31)
MCHC RBC-ENTMCNC: 28.1 G/DL — LOW (ref 32–37)
MCV RBC AUTO: 74.4 FL — LOW (ref 81–99)
NRBC # BLD: 0 /100 WBCS — SIGNIFICANT CHANGE UP (ref 0–0)
PLATELET # BLD AUTO: 157 K/UL — SIGNIFICANT CHANGE UP (ref 130–400)
RBC # BLD: 3.44 M/UL — LOW (ref 4.2–5.4)
RBC # FLD: 19.6 % — HIGH (ref 11.5–14.5)
WBC # BLD: 6.68 K/UL — SIGNIFICANT CHANGE UP (ref 4.8–10.8)
WBC # FLD AUTO: 6.68 K/UL — SIGNIFICANT CHANGE UP (ref 4.8–10.8)

## 2019-01-04 RX ORDER — ACETAMINOPHEN 500 MG
650 TABLET ORAL EVERY 6 HOURS
Qty: 0 | Refills: 0 | Status: DISCONTINUED | OUTPATIENT
Start: 2019-01-04 | End: 2019-01-18

## 2019-01-04 RX ADMIN — MEMANTINE HYDROCHLORIDE 10 MILLIGRAM(S): 10 TABLET ORAL at 05:32

## 2019-01-04 RX ADMIN — PANTOPRAZOLE SODIUM 40 MILLIGRAM(S): 20 TABLET, DELAYED RELEASE ORAL at 05:32

## 2019-01-04 RX ADMIN — PANTOPRAZOLE SODIUM 40 MILLIGRAM(S): 20 TABLET, DELAYED RELEASE ORAL at 18:03

## 2019-01-04 RX ADMIN — ESCITALOPRAM OXALATE 10 MILLIGRAM(S): 10 TABLET, FILM COATED ORAL at 11:55

## 2019-01-04 RX ADMIN — Medication 650 MILLIGRAM(S): at 17:03

## 2019-01-04 RX ADMIN — SIMVASTATIN 20 MILLIGRAM(S): 20 TABLET, FILM COATED ORAL at 21:38

## 2019-01-04 RX ADMIN — LAMOTRIGINE 100 MILLIGRAM(S): 25 TABLET, ORALLY DISINTEGRATING ORAL at 05:32

## 2019-01-04 RX ADMIN — MEMANTINE HYDROCHLORIDE 10 MILLIGRAM(S): 10 TABLET ORAL at 18:03

## 2019-01-04 RX ADMIN — LAMOTRIGINE 100 MILLIGRAM(S): 25 TABLET, ORALLY DISINTEGRATING ORAL at 18:03

## 2019-01-04 RX ADMIN — QUETIAPINE FUMARATE 25 MILLIGRAM(S): 200 TABLET, FILM COATED ORAL at 21:38

## 2019-01-04 NOTE — PHYSICAL THERAPY INITIAL EVALUATION ADULT - GENERAL OBSERVATIONS, REHAB EVAL
10:25 - 10:45. Chart reviewed. Patient available to be seen for physical therapy, confirmed with nurse. Patient encountered already out of bed in chair, 1:1 PCA at bedside. Agreeable for PT evaluation.

## 2019-01-04 NOTE — PROGRESS NOTE ADULT - SUBJECTIVE AND OBJECTIVE BOX
Neurology Follow up note    Name  SONYA YI    HPI:  69 yo female with h/o Dementia who lives alone presents after daughter found her on bathroom floor. As per pts daughter she seemed more confused and had some slurred speech when she found her. She was moaning but not complaining of any specific pain.     Daughter has limited information on pts hx because she has only started speaking to her in the last 2 months, but had been astranged for 7 years prior. Pt lived with her  who abandoned her in august of this year as per pts daughter.     Pt now offers no complaints. Denies pain, n/v, weakness, CP, SOB, dizziness or any bleeding. (01 Jan 2019 14:22)      Interval History:    patient remains disoriented  EEG was mildly slow but non epileptiform    Vital Signs Last 24 Hrs  T(C): 37 (04 Jan 2019 13:57), Max: 37.2 (03 Jan 2019 22:47)  T(F): 98.6 (04 Jan 2019 13:57), Max: 98.9 (03 Jan 2019 22:47)  HR: 72 (04 Jan 2019 13:57) (72 - 78)  BP: 116/57 (04 Jan 2019 13:57) (116/57 - 137/65)  BP(mean): --  RR: 16 (04 Jan 2019 13:57) (14 - 16)  SpO2: --    Neurological Exam:   MS: AWAKE AND ALERT , ORIENTED TO SELF ONLY, NO LANGUAGE DYSFUNCTION  LACKS INSIGHT INTO VISIT  Cranial nerves: Pupils equally round and reactive to light, visual fields full, no nystagmus, extraocular muscles intact, V1 through V3 intact bilaterally and symmetric, face symmetric, hearing intact to finger rub, palate elevation symmetric, tongue was midline.  Motor:  MRC grading 5/5 b/l UE/LE.    Sensation: Intact to light touch, proprioception, and pinprick.   Coordination: No dysmetria on finger-to-nose        Medications  escitalopram 10 milliGRAM(s) Oral daily  lamoTRIgine 100 milliGRAM(s) Oral two times a day  LORazepam   Injectable 2 milliGRAM(s) IV Push once PRN  memantine 10 milliGRAM(s) Oral two times a day  pantoprazole    Tablet 40 milliGRAM(s) Oral two times a day  QUEtiapine 25 milliGRAM(s) Oral at bedtime  simvastatin 20 milliGRAM(s) Oral at bedtime  sodium chloride 0.9%. 1000 milliLiter(s) IV Continuous <Continuous>      Lab  01-03    141  |  105  |  9<L>  ----------------------------<  186<H>  4.1   |  28  |  0.8    Ca    9.1      03 Jan 2019 07:26                            7.2    6.68  )-----------( 157      ( 04 Jan 2019 06:55 )             25.6               Radiology      Assessment: ams likely delirium/tme  eeg negative for epileptiform activity  MRI was motion limited but no acute change noted  patient with severe microvasc disease    Plan:  cont lamictal at current dose and please check levels  if within normal range, no further neuro w/u needed at this time

## 2019-01-04 NOTE — PHYSICAL THERAPY INITIAL EVALUATION ADULT - IMPAIRMENTS FOUND, PT EVAL
muscle strength/posture/aerobic capacity/endurance/gait, locomotion, and balance/poor safety awareness/ergonomics and body mechanics

## 2019-01-04 NOTE — PROGRESS NOTE ADULT - ASSESSMENT
67 yo female pmh of breast cancer, dementia, depression, seizures, h/o CVA x2 and Cerebral Aneurysm as per daughter presented with LOC/Fall Found to be anemic, requiring PRBC transfusions    1. Anemia, intermittent melanotic stools  2. Delirium verus worsening dementia  3. Seizure disorder  4. Breast CA  5. Cerebral aneurysm as per daughter    -- I had a long discussion with the daughter, who has been estranged from pt for 7 years and has only been around for the last 2 months  --no known underlying psychiatric illnesses   --patient is calm, cooperative  -- appreciate psych eval   --As per freddy, last Hb was 7.2 at PMDs office 1 month ago  --patient hemodynamically stable, ambulating around the room  --she is now agreeably to EGD/COlono. Will recall GI as pt now agrees. PLEASE CONTACT DAUGHTER AS WELL as she is the HCP for procedure consent  -- MRI grossly unremarkable, although poor quality, no seizure activity on EEG  --Will need repeat MRI as outpatient given history of aneurysm (pt follows neuro as OP)  --c/w lamictal, check levels  --will hold off on blood transfusion as 7.2 seems to be stable  --check AM labs  --plan discussed with daughter, questions answered

## 2019-01-04 NOTE — CONSULT NOTE ADULT - REASON FOR ADMISSION
Syncope vs Seizure vs. fall

## 2019-01-04 NOTE — PHYSICAL THERAPY INITIAL EVALUATION ADULT - GAIT DEVIATIONS NOTED, PT EVAL
decreased weight-shifting ability/increased time in double stance/decreased step length/unsteady gait/decreased amelia

## 2019-01-04 NOTE — PROGRESS NOTE ADULT - ASSESSMENT
69 yo female pmh of breast cancer, dementia, depression, seizures with    Problem1- Anemia and intermittent melenic stools  Rec  -Patient states she will think about endoscopic management  -Will revisit endoscopy after Neurology workup with patient's permission and consent as she will not consent to it now but states she might be open to it down the line  -Continue PPI  -Transfuse to hgb >8  -Maintain hemodynamic stability   Please Recall GI as needed if patient is open to Endoscopy    Problem 2-Slurred speech and confusion with history of seizures  Rec  -Neurology on board, care as per neurology team  -awaiting MRI results 67 yo female pmh of breast cancer, dementia, depression, seizures with    Problem1- Anemia and intermittent melenic stools  Rec  -Patient states she will think about endoscopic management  -Will revisit endoscopy after Neurology workup with patient's permission and consent as she will not consent to it now but states she might be open to it down the line  -Continue PPI  -Transfuse to hgb >8  -Maintain hemodynamic stability   Please Recall GI as needed if patient is open to Endoscopy    Problem 2-Slurred speech and confusion with history of seizures  Rec  -Neurology on board, care as per neurology team 67 yo female pmh of breast cancer, dementia, depression, seizures with    Problem1- Anemia and intermittent melenic stools  Rec  -Patient refusing endoscopic workup   -Continue PPI  -Transfuse to hgb >8  -Maintain hemodynamic stability   -Please Recall GI as needed if any evidence of active GI bleeding or if patient becomes further anemic, or if patient will consent to Endoscopy.    Problem 2-Slurred speech and confusion with history of seizures  Rec  -Neurology on board, care as per neurology team 69 yo female pmh of breast cancer, dementia, depression, seizures with    Problem1- Anemia and intermittent melenic stools  Rec  -Patient refusing endoscopic workup   -Continue PPI  -Transfuse to hgb >8  -Maintain hemodynamic stability   -Please Recall GI as needed if any evidence of active GI bleeding or if patient becomes further anemic, or if patient will consent to Endoscopy.

## 2019-01-04 NOTE — PROGRESS NOTE ADULT - SUBJECTIVE AND OBJECTIVE BOX
SONYA YI  68y Female    CHIEF COMPLAINT:    Patient is a 68y old  Female who presents with a chief complaint of Syncope vs Seizure vs. fall (04 Jan 2019 15:22)      INTERVAL HPI/OVERNIGHT EVENTS:    Patient seen and examined. No acute events overnight.     ROS: All other systems are negative.    Vital Signs:    T(F): 98.6 (01-04-19 @ 13:57), Max: 98.9 (01-03-19 @ 22:47)  HR: 72 (01-04-19 @ 13:57) (72 - 78)  BP: 116/57 (01-04-19 @ 13:57) (116/57 - 137/65)  RR: 16 (01-04-19 @ 13:57) (14 - 16)  SpO2: --  I&O's Summary    Daily     Daily   CAPILLARY BLOOD GLUCOSE    PHYSICAL EXAM:    GENERAL:  NAD  SKIN: No rashes or lesions  HEENT: Atraumatic. Normocephalic.   NECK: Supple, No JVD. No lymphadenopathy.  PULMONARY: CTA B/L. No wheezing. No rales  CVS: Normal S1, S2. Rate and Rhythm are regular. No murmurs.  ABDOMEN/GI: Soft, Nontender, Nondistended; BS present  MSK:  No edema B/L LE. no clubbing  NEUROLOGIC:  No motor or sensory deficit.  PSYCH: Alert & oriented x 1-2 normal affect    Consultant(s) Notes Reviewed:  [x ] YES  [ ] NO  Care Discussed with Consultants/Other Providers [ x] YES  [ ] NO    LABS:                        7.2    6.68  )-----------( 157      ( 04 Jan 2019 06:55 )             25.6     01-03    141  |  105  |  9<L>  ----------------------------<  186<H>  4.1   |  28  |  0.8    Ca    9.1      03 Jan 2019 07:26    Culture - Urine (collected 01 Jan 2019 23:50)  Source: .Urine Clean Catch (Midstream)  Final Report (03 Jan 2019 18:56):    10,000 - 49,000 CFU/mL Coag Negative Staphylococcus        RADIOLOGY & ADDITIONAL TESTS:    Imaging or report Personally Reviewed:  [x] YES  [ ] NO  EKG reviewed: [x] YES  [ ] NO    Medications:  Standing  escitalopram 10 milliGRAM(s) Oral daily  lamoTRIgine 100 milliGRAM(s) Oral two times a day  memantine 10 milliGRAM(s) Oral two times a day  pantoprazole    Tablet 40 milliGRAM(s) Oral two times a day  QUEtiapine 25 milliGRAM(s) Oral at bedtime  simvastatin 20 milliGRAM(s) Oral at bedtime  sodium chloride 0.9%. 1000 milliLiter(s) IV Continuous <Continuous>    PRN Meds  acetaminophen   Tablet .. 650 milliGRAM(s) Oral every 6 hours PRN  LORazepam   Injectable 2 milliGRAM(s) IV Push once PRN

## 2019-01-04 NOTE — PROGRESS NOTE ADULT - SUBJECTIVE AND OBJECTIVE BOX
68yFemale  Being followed for anemia and intermittent melenic stools  Interval history: Patient is obtunded today s/p ativan last night at 7p.m. Patient is very drowsy and speaks to me then falls asleep. Patient is having difficulty staying awake. Patient denies nausea, vomiting, diarrhea, constipation, hematemesis, blood in stool. Patient reports she has not had a bowel movement since Wednesday.       PMHX/PSHX:  PAST MEDICAL & SURGICAL HISTORY:  Brain aneurysm  Seizures  Depression, unspecified depression type  Hyperlipidemia, unspecified hyperlipidemia type  Dementia  Breast cancer in female  H/O abdominal hysterectomy  H/O mastectomy, left        Social History: No smoking. No alcohol. No illegal drug use.        MEDICATIONS:  MEDICATIONS  (STANDING):  escitalopram 10 milliGRAM(s) Oral daily  lamoTRIgine 100 milliGRAM(s) Oral two times a day  memantine 10 milliGRAM(s) Oral two times a day  pantoprazole    Tablet 40 milliGRAM(s) Oral two times a day  QUEtiapine 25 milliGRAM(s) Oral at bedtime  simvastatin 20 milliGRAM(s) Oral at bedtime  sodium chloride 0.9%. 1000 milliLiter(s) (75 mL/Hr) IV Continuous <Continuous>    MEDICATIONS  (PRN):  LORazepam   Injectable 2 milliGRAM(s) IV Push once PRN anxiety for mri        Allergies:    No Known Allergies    Intolerances          REVIEW OF SYSTEMS:  General:  No weight loss, fevers, or chills.  Eyes:  No reported pain or visual changes  ENT:  No sore throat or runny nose.  NECK: No stiffness   CV:  No chest pain or palpitations.  Resp:  No shortness of breath, cough  GI:  No abdominal pain, nausea, vomiting, dysphagia, diarrhea or constipation. No rectal bleeding, melena, or hematemesis.  Neuro:  No tingling, numbness           VITAL SIGNS:   T(F): 96.5 (01-04-19 @ 04:15), Max: 98.9 (01-03-19 @ 22:47)  HR: 78 (01-04-19 @ 04:15) (77 - 80)  BP: 130/56 (01-04-19 @ 04:15) (112/55 - 137/65)  RR: 14 (01-04-19 @ 04:15) (14 - 16)  SpO2: --    PHYSICAL EXAM:  GENERAL: Obtunded today and drowsy,, no acute distress.  HEAD:  Atraumatic, Normocephalic  EYES: conjunctiva and sclera clear  NECK: Supple, no JVD or thyromegaly  CHEST/LUNG: Clear to auscultation bilaterally; No wheeze, rhonchi, or rales  HEART: Regular rate and rhythm; normal S1, S2, No murmurs.  ABDOMEN: Soft, nontender, nondistended; Bowel sounds present, no abdominal bruit, masses, or hepatosplenomegaly  NEUROLOGY: No asterixis or tremor.   SKIN: Intact, no jaundice  Rectal Exam-Patient refused at bedside          LABS:                        7.2    7.46  )-----------( 173      ( 03 Jan 2019 07:26 )             25.3     01-03    141  |  105  |  9<L>  ----------------------------<  186<H>  4.1   |  28  |  0.8    Ca    9.1      03 Jan 2019 07:26 68yFemale  Being followed for anemia and intermittent melenic stools  Interval history: Patient is obtunded today s/p ativan last night at 7p.m. Patient is very drowsy and speaks to me then falls asleep. Patient is having difficulty staying awake. Patient denies nausea, vomiting, diarrhea, constipation, hematemesis, blood in stool. Patient reports she has not had a bowel movement since Wednesday.       PMHX/PSHX:  PAST MEDICAL & SURGICAL HISTORY:  Brain aneurysm  Seizures  Depression, unspecified depression type  Hyperlipidemia, unspecified hyperlipidemia type  Dementia  Breast cancer in female  H/O abdominal hysterectomy  H/O mastectomy, left        Social History: No smoking. No alcohol. No illegal drug use.        MEDICATIONS:  MEDICATIONS  (STANDING):  escitalopram 10 milliGRAM(s) Oral daily  lamoTRIgine 100 milliGRAM(s) Oral two times a day  memantine 10 milliGRAM(s) Oral two times a day  pantoprazole    Tablet 40 milliGRAM(s) Oral two times a day  QUEtiapine 25 milliGRAM(s) Oral at bedtime  simvastatin 20 milliGRAM(s) Oral at bedtime  sodium chloride 0.9%. 1000 milliLiter(s) (75 mL/Hr) IV Continuous <Continuous>    MEDICATIONS  (PRN):  LORazepam   Injectable 2 milliGRAM(s) IV Push once PRN anxiety for mri        Allergies:    No Known Allergies    Intolerances          REVIEW OF SYSTEMS:  General:  No fevers, or chills.  Eyes:  No reported pain or visual changes  ENT:  No sore throat or runny nose.  NECK: No stiffness   CV:  No chest pain or palpitations.  Resp:  No shortness of breath, cough  GI:  No abdominal pain, nausea, vomiting, dysphagia, diarrhea or constipation. No rectal bleeding, melena, or hematemesis.  Neuro:  No tingling, numbness           VITAL SIGNS:   T(F): 96.5 (01-04-19 @ 04:15), Max: 98.9 (01-03-19 @ 22:47)  HR: 78 (01-04-19 @ 04:15) (77 - 80)  BP: 130/56 (01-04-19 @ 04:15) (112/55 - 137/65)  RR: 14 (01-04-19 @ 04:15) (14 - 16)  SpO2: --    PHYSICAL EXAM:  GENERAL: Obtunded today and drowsy,, no acute distress.  HEAD:  Atraumatic, Normocephalic  EYES: conjunctiva and sclera clear  NECK: Supple, no JVD or thyromegaly  CHEST/LUNG: Clear to auscultation bilaterally; No wheeze, rhonchi, or rales  HEART: Regular rate and rhythm; normal S1, S2, No murmurs.  ABDOMEN: Soft, nontender, nondistended; Bowel sounds present, no abdominal bruit, masses, or hepatosplenomegaly  NEUROLOGY: No asterixis or tremor.   SKIN: Intact, no jaundice  Rectal Exam-Patient refused at bedside          LABS:                        7.2    7.46  )-----------( 173      ( 03 Jan 2019 07:26 )             25.3     01-03    141  |  105  |  9<L>  ----------------------------<  186<H>  4.1   |  28  |  0.8    Ca    9.1      03 Jan 2019 07:26        ALVERTO COLLINS M.D., ATTENDING RADIOLOGIST  This document has been electronically signed. Jan 4 2019  9:44AM        < end of copied text > 68yFemale  Being followed for anemia and intermittent melenic stools  Interval history: Patient is obtunded today s/p ativan last night at 7p.m. Patient is very drowsy and speaks to me then falls asleep. Patient is having difficulty staying awake. Patient denies nausea, vomiting, diarrhea, constipation, hematemesis, blood in stool. Patient reports she has not had a bowel movement since Wednesday.       PMHX/PSHX:  PAST MEDICAL & SURGICAL HISTORY:  Brain aneurysm  Seizures  Depression, unspecified depression type  Hyperlipidemia, unspecified hyperlipidemia type  Dementia  Breast cancer in female  H/O abdominal hysterectomy  H/O mastectomy, left        Social History: No smoking. No alcohol. No illegal drug use.        MEDICATIONS:  MEDICATIONS  (STANDING):  escitalopram 10 milliGRAM(s) Oral daily  lamoTRIgine 100 milliGRAM(s) Oral two times a day  memantine 10 milliGRAM(s) Oral two times a day  pantoprazole    Tablet 40 milliGRAM(s) Oral two times a day  QUEtiapine 25 milliGRAM(s) Oral at bedtime  simvastatin 20 milliGRAM(s) Oral at bedtime  sodium chloride 0.9%. 1000 milliLiter(s) (75 mL/Hr) IV Continuous <Continuous>    MEDICATIONS  (PRN):  LORazepam   Injectable 2 milliGRAM(s) IV Push once PRN anxiety for mri        Allergies:    No Known Allergies    REVIEW OF SYSTEMS:  General:  No fevers, or chills.  Eyes:  No reported pain or visual changes  ENT:  No sore throat or runny nose.  NECK: No stiffness   CV:  No chest pain or palpitations.  Resp:  No shortness of breath, cough  GI:  No abdominal pain, nausea, vomiting, dysphagia, diarrhea or constipation. No rectal bleeding, melena, or hematemesis.  Neuro:  No tingling, numbness           VITAL SIGNS:   T(F): 96.5 (01-04-19 @ 04:15), Max: 98.9 (01-03-19 @ 22:47)  HR: 78 (01-04-19 @ 04:15) (77 - 80)  BP: 130/56 (01-04-19 @ 04:15) (112/55 - 137/65)  RR: 14 (01-04-19 @ 04:15) (14 - 16)  SpO2: --    PHYSICAL EXAM:  GENERAL: Obtunded today and drowsy,, no acute distress.  HEAD:  Atraumatic, Normocephalic  EYES: conjunctiva and sclera clear  NECK: Supple, no JVD or thyromegaly  CHEST/LUNG: Clear to auscultation bilaterally; No wheeze, rhonchi, or rales  HEART: Regular rate and rhythm; normal S1, S2, No murmurs.  ABDOMEN: Soft, nontender, nondistended; Bowel sounds present, no abdominal bruit, masses, or hepatosplenomegaly  NEUROLOGY: No asterixis or tremor.   SKIN: Intact, no jaundice  Rectal Exam-Patient refused at bedside          LABS:                        7.2    7.46  )-----------( 173      ( 03 Jan 2019 07:26 )             25.3     01-03    141  |  105  |  9<L>  ----------------------------<  186<H>  4.1   |  28  |  0.8    Ca    9.1      03 Jan 2019 07:26        ALVERTO COLLINS M.D., ATTENDING RADIOLOGIST  This document has been electronically signed. Jan 4 2019  9:44AM        < end of copied text >

## 2019-01-04 NOTE — PHYSICAL THERAPY INITIAL EVALUATION ADULT - TRANSFER SAFETY CONCERNS NOTED: SIT/STAND, REHAB EVAL
decreased safety awareness/losing balance/decreased weight-shifting ability/decreased step length/decreased balance during turns

## 2019-01-04 NOTE — CONSULT NOTE ADULT - SUBJECTIVE AND OBJECTIVE BOX
Reviewed and referred to chart notes.    pt is drowsy with slow mentation. unable to keep th alert ness and sustain attention to task. confused and not oriented to time and situation.

## 2019-01-05 LAB
ANION GAP SERPL CALC-SCNC: 12 MMOL/L — SIGNIFICANT CHANGE UP (ref 7–14)
BASOPHILS # BLD AUTO: 0.02 K/UL — SIGNIFICANT CHANGE UP (ref 0–0.2)
BASOPHILS NFR BLD AUTO: 0.3 % — SIGNIFICANT CHANGE UP (ref 0–1)
BUN SERPL-MCNC: 19 MG/DL — SIGNIFICANT CHANGE UP (ref 10–20)
CALCIUM SERPL-MCNC: 9.2 MG/DL — SIGNIFICANT CHANGE UP (ref 8.5–10.1)
CHLORIDE SERPL-SCNC: 104 MMOL/L — SIGNIFICANT CHANGE UP (ref 98–110)
CO2 SERPL-SCNC: 27 MMOL/L — SIGNIFICANT CHANGE UP (ref 17–32)
CREAT SERPL-MCNC: 1.2 MG/DL — SIGNIFICANT CHANGE UP (ref 0.7–1.5)
EOSINOPHIL # BLD AUTO: 0.13 K/UL — SIGNIFICANT CHANGE UP (ref 0–0.7)
EOSINOPHIL NFR BLD AUTO: 1.9 % — SIGNIFICANT CHANGE UP (ref 0–8)
GLUCOSE SERPL-MCNC: 136 MG/DL — HIGH (ref 70–99)
HCT VFR BLD CALC: 25.5 % — LOW (ref 37–47)
HGB BLD-MCNC: 7.2 G/DL — CRITICAL LOW (ref 12–16)
IMM GRANULOCYTES NFR BLD AUTO: 0.6 % — HIGH (ref 0.1–0.3)
LYMPHOCYTES # BLD AUTO: 1.43 K/UL — SIGNIFICANT CHANGE UP (ref 1.2–3.4)
LYMPHOCYTES # BLD AUTO: 20.4 % — LOW (ref 20.5–51.1)
MAGNESIUM SERPL-MCNC: 2.2 MG/DL — SIGNIFICANT CHANGE UP (ref 1.8–2.4)
MCHC RBC-ENTMCNC: 21.2 PG — LOW (ref 27–31)
MCHC RBC-ENTMCNC: 28.2 G/DL — LOW (ref 32–37)
MCV RBC AUTO: 75 FL — LOW (ref 81–99)
MONOCYTES # BLD AUTO: 0.47 K/UL — SIGNIFICANT CHANGE UP (ref 0.1–0.6)
MONOCYTES NFR BLD AUTO: 6.7 % — SIGNIFICANT CHANGE UP (ref 1.7–9.3)
NEUTROPHILS # BLD AUTO: 4.93 K/UL — SIGNIFICANT CHANGE UP (ref 1.4–6.5)
NEUTROPHILS NFR BLD AUTO: 70.1 % — SIGNIFICANT CHANGE UP (ref 42.2–75.2)
NRBC # BLD: 0 /100 WBCS — SIGNIFICANT CHANGE UP (ref 0–0)
PLATELET # BLD AUTO: 161 K/UL — SIGNIFICANT CHANGE UP (ref 130–400)
POTASSIUM SERPL-MCNC: 4.1 MMOL/L — SIGNIFICANT CHANGE UP (ref 3.5–5)
POTASSIUM SERPL-SCNC: 4.1 MMOL/L — SIGNIFICANT CHANGE UP (ref 3.5–5)
RBC # BLD: 3.4 M/UL — LOW (ref 4.2–5.4)
RBC # FLD: 20.2 % — HIGH (ref 11.5–14.5)
SODIUM SERPL-SCNC: 143 MMOL/L — SIGNIFICANT CHANGE UP (ref 135–146)
WBC # BLD: 7.02 K/UL — SIGNIFICANT CHANGE UP (ref 4.8–10.8)
WBC # FLD AUTO: 7.02 K/UL — SIGNIFICANT CHANGE UP (ref 4.8–10.8)

## 2019-01-05 RX ADMIN — LAMOTRIGINE 100 MILLIGRAM(S): 25 TABLET, ORALLY DISINTEGRATING ORAL at 17:05

## 2019-01-05 RX ADMIN — PANTOPRAZOLE SODIUM 40 MILLIGRAM(S): 20 TABLET, DELAYED RELEASE ORAL at 17:05

## 2019-01-05 RX ADMIN — PANTOPRAZOLE SODIUM 40 MILLIGRAM(S): 20 TABLET, DELAYED RELEASE ORAL at 06:18

## 2019-01-05 RX ADMIN — MEMANTINE HYDROCHLORIDE 10 MILLIGRAM(S): 10 TABLET ORAL at 17:05

## 2019-01-05 RX ADMIN — QUETIAPINE FUMARATE 25 MILLIGRAM(S): 200 TABLET, FILM COATED ORAL at 21:19

## 2019-01-05 RX ADMIN — SIMVASTATIN 20 MILLIGRAM(S): 20 TABLET, FILM COATED ORAL at 21:19

## 2019-01-05 RX ADMIN — MEMANTINE HYDROCHLORIDE 10 MILLIGRAM(S): 10 TABLET ORAL at 06:18

## 2019-01-05 RX ADMIN — ESCITALOPRAM OXALATE 10 MILLIGRAM(S): 10 TABLET, FILM COATED ORAL at 14:19

## 2019-01-05 RX ADMIN — LAMOTRIGINE 100 MILLIGRAM(S): 25 TABLET, ORALLY DISINTEGRATING ORAL at 06:18

## 2019-01-05 NOTE — PROGRESS NOTE ADULT - SUBJECTIVE AND OBJECTIVE BOX
Patient seen and examined.    Interval Complaints: patient /family agreeable to endoscopy        Medications:  acetaminophen   Tablet .. 650 milliGRAM(s) Oral every 6 hours PRN  escitalopram 10 milliGRAM(s) Oral daily  lamoTRIgine 100 milliGRAM(s) Oral two times a day  LORazepam   Injectable 2 milliGRAM(s) IV Push once PRN  memantine 10 milliGRAM(s) Oral two times a day  pantoprazole    Tablet 40 milliGRAM(s) Oral two times a day  QUEtiapine 25 milliGRAM(s) Oral at bedtime  simvastatin 20 milliGRAM(s) Oral at bedtime  sodium chloride 0.9%. 1000 milliLiter(s) IV Continuous <Continuous>            PHYSICAL EXAM:   Vital Signs:  Vital Signs Last 24 Hrs  T(C): 36.1 (05 Jan 2019 14:45), Max: 37.4 (05 Jan 2019 05:00)  T(F): 97 (05 Jan 2019 14:45), Max: 99.4 (05 Jan 2019 05:00)  HR: 85 (05 Jan 2019 14:45) (70 - 87)  BP: 111/85 (05 Jan 2019 14:45) (101/54 - 114/55)  BP(mean): --  RR: 16 (05 Jan 2019 14:45) (16 - 16)  SpO2: --  Daily     Daily     T(C): 36.1 (01-05-19 @ 14:45), Max: 37.4 (01-05-19 @ 05:00)  HR: 85 (01-05-19 @ 14:45) (70 - 87)  BP: 111/85 (01-05-19 @ 14:45) (101/54 - 114/55)  RR: 16 (01-05-19 @ 14:45) (16 - 16)  SpO2: --    GENERAL:   no distress  HEENT:  NC/AT,  conjunctivae clear and pink, no thyromegaly, nodules, adenopathy, no JVD, sclera -anicteric  CHEST:  Full & symmetric excursion, no increased effort, breath sounds clear  HEART:  Regular rhythm, S1, S2, no murmur/rub/S3/S4, no abdominal bruit, no edema  ABDOMEN:  Soft, non-tender, non-distended, normoactive bowel sounds,  no masses ,no hepato-splenomegaly, no signs of chronic liver disease  EXTEREMITIES:  no cyanosis,clubbing or edema  SKIN:  No rash/erythema/ecchymoses/petechiae/wounds/abscess/warm/dry  NEURO:  confused    LABS:                        7.2    7.02  )-----------( 161      ( 05 Jan 2019 06:55 )             25.5     01-05    143  |  104  |  19  ----------------------------<  136<H>  4.1   |  27  |  1.2    Ca    9.2      05 Jan 2019 06:55  Mg     2.2     01-05                Imaging:      Assessment and Plan:

## 2019-01-05 NOTE — PROGRESS NOTE ADULT - ASSESSMENT
67 yo female pmh of breast cancer, dementia, depression, seizures, h/o CVA x2 and Cerebral Aneurysm as per daughter presented with LOC/Fall Found to be anemic, requiring PRBC transfusions    1. Anemia of chronic disease:  GI workup EGD, COlonosocpy  GI follow up appreciated  -No indications for blood transfusion at the current time  2. Delirium verus worsening dementia:  Psych following - 1:1 sit  3. Seizure disorder  Neurology folowing- On Lamictal  4. Breast CA  5. Cerebral aneurysm as per daughter  -- MRI grossly unremarkable, although poor quality, no seizure activity on EEG  --Will need repeat MRI as outpatient given history of aneurysm (pt follows neuro as OP)  --c/w lamictal,  DVT, GI Prophylaxis    Pager No.  [phone]

## 2019-01-05 NOTE — PROGRESS NOTE ADULT - SUBJECTIVE AND OBJECTIVE BOX
Chief Complaint:  Patient is a 68y old  Female who presents with a chief complaint of Syncope vs Seizure vs. fall (04 Jan 2019 18:03)      Interval Events:     Allergies:  No Known Allergies      Home Medications:    Hospital Medications:  acetaminophen   Tablet .. 650 milliGRAM(s) Oral every 6 hours PRN  escitalopram 10 milliGRAM(s) Oral daily  lamoTRIgine 100 milliGRAM(s) Oral two times a day  LORazepam   Injectable 2 milliGRAM(s) IV Push once PRN  memantine 10 milliGRAM(s) Oral two times a day  pantoprazole    Tablet 40 milliGRAM(s) Oral two times a day  QUEtiapine 25 milliGRAM(s) Oral at bedtime  simvastatin 20 milliGRAM(s) Oral at bedtime  sodium chloride 0.9%. 1000 milliLiter(s) IV Continuous <Continuous>      PMHX/PSHX:  Brain aneurysm  Seizures  Depression, unspecified depression type  Hyperlipidemia, unspecified hyperlipidemia type  Dementia  Breast cancer in female  H/O abdominal hysterectomy  H/O mastectomy, left      Family history:  No pertinent family history in first degree relatives      ROS:   As mentioned below      PHYSICAL EXAM:   Vital Signs:  Vital Signs Last 24 Hrs  T(C): 37.4 (05 Jan 2019 05:00), Max: 37.4 (05 Jan 2019 05:00)  T(F): 99.4 (05 Jan 2019 05:00), Max: 99.4 (05 Jan 2019 05:00)  HR: 70 (05 Jan 2019 05:00) (70 - 87)  BP: 101/54 (05 Jan 2019 05:00) (101/54 - 116/57)  BP(mean): --  RR: 16 (05 Jan 2019 05:00) (16 - 16)  SpO2: --  Daily     Daily     GENERAL:  NAD  HEENT:  NC/AT,  No Thyromegaly  CHEST:  CTA B/L  HEART:  S1, S2- No M, R, G  ABDOMEN:  Soft, NT, ND  EXTEREMITIES:  no cyanosis,clubbing or edema  SKIN:  NAD  NEURO:  Grossly Nr.    LABS:                        7.2    7.02  )-----------( 161      ( 05 Jan 2019 06:55 )             25.5     01-05    143  |  104  |  19  ----------------------------<  136<H>  4.1   |  27  |  1.2    Ca    9.2      05 Jan 2019 06:55  Mg     2.2     01-05                Imaging:

## 2019-01-06 LAB
ALBUMIN SERPL ELPH-MCNC: 4.2 G/DL — SIGNIFICANT CHANGE UP (ref 3.5–5.2)
ALP SERPL-CCNC: 74 U/L — SIGNIFICANT CHANGE UP (ref 30–115)
ALT FLD-CCNC: 21 U/L — SIGNIFICANT CHANGE UP (ref 0–41)
ANION GAP SERPL CALC-SCNC: 18 MMOL/L — HIGH (ref 7–14)
AST SERPL-CCNC: 36 U/L — SIGNIFICANT CHANGE UP (ref 0–41)
BILIRUB SERPL-MCNC: 0.4 MG/DL — SIGNIFICANT CHANGE UP (ref 0.2–1.2)
BUN SERPL-MCNC: 17 MG/DL — SIGNIFICANT CHANGE UP (ref 10–20)
CALCIUM SERPL-MCNC: 8.8 MG/DL — SIGNIFICANT CHANGE UP (ref 8.5–10.1)
CHLORIDE SERPL-SCNC: 103 MMOL/L — SIGNIFICANT CHANGE UP (ref 98–110)
CO2 SERPL-SCNC: 21 MMOL/L — SIGNIFICANT CHANGE UP (ref 17–32)
CREAT SERPL-MCNC: 0.9 MG/DL — SIGNIFICANT CHANGE UP (ref 0.7–1.5)
GLUCOSE SERPL-MCNC: 145 MG/DL — HIGH (ref 70–99)
HCT VFR BLD CALC: 29.2 % — LOW (ref 37–47)
HGB BLD-MCNC: 8.1 G/DL — LOW (ref 12–16)
MCHC RBC-ENTMCNC: 20.9 PG — LOW (ref 27–31)
MCHC RBC-ENTMCNC: 27.7 G/DL — LOW (ref 32–37)
MCV RBC AUTO: 75.3 FL — LOW (ref 81–99)
NRBC # BLD: 0 /100 WBCS — SIGNIFICANT CHANGE UP (ref 0–0)
PLATELET # BLD AUTO: 207 K/UL — SIGNIFICANT CHANGE UP (ref 130–400)
POTASSIUM SERPL-MCNC: 4.4 MMOL/L — SIGNIFICANT CHANGE UP (ref 3.5–5)
POTASSIUM SERPL-SCNC: 4.4 MMOL/L — SIGNIFICANT CHANGE UP (ref 3.5–5)
PROT SERPL-MCNC: 7.5 G/DL — SIGNIFICANT CHANGE UP (ref 6–8)
RBC # BLD: 3.88 M/UL — LOW (ref 4.2–5.4)
RBC # FLD: 20.6 % — HIGH (ref 11.5–14.5)
SODIUM SERPL-SCNC: 142 MMOL/L — SIGNIFICANT CHANGE UP (ref 135–146)
WBC # BLD: 9.79 K/UL — SIGNIFICANT CHANGE UP (ref 4.8–10.8)
WBC # FLD AUTO: 9.79 K/UL — SIGNIFICANT CHANGE UP (ref 4.8–10.8)

## 2019-01-06 RX ADMIN — SIMVASTATIN 20 MILLIGRAM(S): 20 TABLET, FILM COATED ORAL at 21:12

## 2019-01-06 RX ADMIN — LAMOTRIGINE 100 MILLIGRAM(S): 25 TABLET, ORALLY DISINTEGRATING ORAL at 18:20

## 2019-01-06 RX ADMIN — QUETIAPINE FUMARATE 25 MILLIGRAM(S): 200 TABLET, FILM COATED ORAL at 21:12

## 2019-01-06 RX ADMIN — MEMANTINE HYDROCHLORIDE 10 MILLIGRAM(S): 10 TABLET ORAL at 05:39

## 2019-01-06 RX ADMIN — PANTOPRAZOLE SODIUM 40 MILLIGRAM(S): 20 TABLET, DELAYED RELEASE ORAL at 18:20

## 2019-01-06 RX ADMIN — LAMOTRIGINE 100 MILLIGRAM(S): 25 TABLET, ORALLY DISINTEGRATING ORAL at 05:38

## 2019-01-06 RX ADMIN — ESCITALOPRAM OXALATE 10 MILLIGRAM(S): 10 TABLET, FILM COATED ORAL at 11:57

## 2019-01-06 RX ADMIN — MEMANTINE HYDROCHLORIDE 10 MILLIGRAM(S): 10 TABLET ORAL at 18:20

## 2019-01-06 RX ADMIN — PANTOPRAZOLE SODIUM 40 MILLIGRAM(S): 20 TABLET, DELAYED RELEASE ORAL at 05:39

## 2019-01-06 NOTE — PROGRESS NOTE ADULT - ASSESSMENT
69 yo female pmh of breast cancer, dementia, depression, seizures, h/o CVA x2 and Cerebral Aneurysm as per daughter presented with LOC/Fall Found to be anemic, requiring PRBC transfusions    1. Anemia of chronic disease:  GI workup EGD, COlonosocpy  GI follow up appreciated  -No indications for blood transfusion at the current time  2. Delirium verus worsening dementia:  Psych following - 1:1 sit  3. Seizure disorder  Neurology folowing- On Lamictal  4. Breast CA  5. Cerebral aneurysm as per daughter  -- MRI grossly unremarkable, although poor quality, no seizure activity on EEG  --Will need repeat MRI as outpatient given history of aneurysm (pt follows neuro as OP)  --c/w lamictal,  DVT, GI Prophylaxis  NPO for EGD in AM    Pager No.  [phone]

## 2019-01-06 NOTE — PROGRESS NOTE ADULT - SUBJECTIVE AND OBJECTIVE BOX
Chief Complaint:  Patient is a 68y old  Female who presents with a chief complaint of Syncope vs Seizure vs. fall (05 Jan 2019 16:54)      Interval Events:     Allergies:  No Known Allergies      Home Medications:    Hospital Medications:  acetaminophen   Tablet .. 650 milliGRAM(s) Oral every 6 hours PRN  escitalopram 10 milliGRAM(s) Oral daily  lamoTRIgine 100 milliGRAM(s) Oral two times a day  LORazepam   Injectable 2 milliGRAM(s) IV Push once PRN  memantine 10 milliGRAM(s) Oral two times a day  pantoprazole    Tablet 40 milliGRAM(s) Oral two times a day  QUEtiapine 25 milliGRAM(s) Oral at bedtime  simvastatin 20 milliGRAM(s) Oral at bedtime  sodium chloride 0.9%. 1000 milliLiter(s) IV Continuous <Continuous>      PMHX/PSHX:  Brain aneurysm  Seizures  Depression, unspecified depression type  Hyperlipidemia, unspecified hyperlipidemia type  Dementia  Breast cancer in female  H/O abdominal hysterectomy  H/O mastectomy, left      Family history:  No pertinent family history in first degree relatives      ROS:   As mentioned below      PHYSICAL EXAM:   Vital Signs:  Vital Signs Last 24 Hrs  T(C): 36.8 (06 Jan 2019 06:07), Max: 37.2 (05 Jan 2019 20:23)  T(F): 98.2 (06 Jan 2019 06:07), Max: 99 (05 Jan 2019 20:23)  HR: 72 (06 Jan 2019 06:07) (72 - 89)  BP: 127/61 (06 Jan 2019 06:07) (111/85 - 149/70)  BP(mean): --  RR: 18 (06 Jan 2019 06:07) (16 - 18)  SpO2: --  Daily     Daily     GENERAL:  NAD  HEENT:  NC/AT,  No Thyromegaly  CHEST:  CTA B/L  HEART:  S1, S2- No M, R, G  ABDOMEN:  Soft, NT, ND  EXTEREMITIES:  no cyanosis,clubbing or edema  SKIN:  NAD  NEURO:  Grossly Nr.    LABS:                        7.2    7.02  )-----------( 161      ( 05 Jan 2019 06:55 )             25.5     01-05    143  |  104  |  19  ----------------------------<  136<H>  4.1   |  27  |  1.2    Ca    9.2      05 Jan 2019 06:55  Mg     2.2     01-05                Imaging:

## 2019-01-07 LAB — LAMOTRIGINE SERPL-MCNC: 4.5 MCG/ML — SIGNIFICANT CHANGE UP (ref 2.5–15)

## 2019-01-07 RX ADMIN — SIMVASTATIN 20 MILLIGRAM(S): 20 TABLET, FILM COATED ORAL at 20:51

## 2019-01-07 RX ADMIN — PANTOPRAZOLE SODIUM 40 MILLIGRAM(S): 20 TABLET, DELAYED RELEASE ORAL at 17:26

## 2019-01-07 RX ADMIN — QUETIAPINE FUMARATE 25 MILLIGRAM(S): 200 TABLET, FILM COATED ORAL at 20:52

## 2019-01-07 RX ADMIN — PANTOPRAZOLE SODIUM 40 MILLIGRAM(S): 20 TABLET, DELAYED RELEASE ORAL at 05:08

## 2019-01-07 RX ADMIN — MEMANTINE HYDROCHLORIDE 10 MILLIGRAM(S): 10 TABLET ORAL at 17:26

## 2019-01-07 RX ADMIN — LAMOTRIGINE 100 MILLIGRAM(S): 25 TABLET, ORALLY DISINTEGRATING ORAL at 05:08

## 2019-01-07 RX ADMIN — LAMOTRIGINE 100 MILLIGRAM(S): 25 TABLET, ORALLY DISINTEGRATING ORAL at 17:26

## 2019-01-07 RX ADMIN — ESCITALOPRAM OXALATE 10 MILLIGRAM(S): 10 TABLET, FILM COATED ORAL at 11:33

## 2019-01-07 RX ADMIN — MEMANTINE HYDROCHLORIDE 10 MILLIGRAM(S): 10 TABLET ORAL at 05:08

## 2019-01-07 NOTE — PROGRESS NOTE ADULT - ASSESSMENT
neurocognitive disorder    pt lacks capacity to make informed decision regarding her treatment and disposition

## 2019-01-07 NOTE — PROGRESS NOTE ADULT - SUBJECTIVE AND OBJECTIVE BOX
68yFemale  Being followed for   Interval history:      PMHX/PSHX:        Social History: No smoking. No alcohol. No illegal drug use.          MEDICATIONS:      Allergies:      REVIEW OF SYSTEMS:  General:  No weight loss, fevers, or chills.  Eyes:  No reported pain or visual changes  ENT:  No sore throat or runny nose.  NECK: No stiffness   CV:  No chest pain or palpitations.  Resp:  No shortness of breath, cough  GI:  No abdominal pain, nausea, vomiting, dysphagia, diarrhea or constipation. No rectal bleeding, melena, or hematemesis.  Muscle:  No aches or weakness  Neuro:  No tingling, numbness   Heme:  No ecchymosis or easy bruisability        VITAL SIGNS:   T(F): 96.2 (01-07-19 @ 06:00), Max: 98.7 (01-06-19 @ 21:41)  HR: 71 (01-07-19 @ 06:00) (71 - 81)  BP: 136/64 (01-07-19 @ 06:00) (114/52 - 136/64)  RR: 18 (01-07-19 @ 06:00) (18 - 18)  SpO2: --    PHYSICAL EXAM:  GENERAL: AAOx3, no acute distress.  HEAD:  Atraumatic, Normocephalic  EYES: conjunctiva and sclera clear  NECK: Supple, no JVD or thyromegaly  CHEST/LUNG: Clear to auscultation bilaterally; No wheeze, rhonchi, or rales  HEART: Regular rate and rhythm; normal S1, S2, No murmurs.  ABDOMEN: Soft, nontender, nondistended; Bowel sounds present, no abdominal bruit, masses, or hepatosplenomegaly  EXTREMITIES:  2+ Peripheral Pulses. No clubbing, cyanosis, or edema, warm  NEUROLOGY: No asterixis or tremor.   SKIN: Intact, no jaundice            LABS:                        8.1    9.79  )-----------( 207      ( 06 Jan 2019 15:26 )             29.2     01-06    142  |  103  |  17  ----------------------------<  145<H>  4.4   |  21  |  0.9    Ca    8.8      06 Jan 2019 15:26    TPro  7.5  /  Alb  4.2  /  TBili  0.4  /  DBili  x   /  AST  36  /  ALT  21  /  AlkPhos  74  01-06    LIVER FUNCTIONS - ( 06 Jan 2019 15:26 )  Alb: 4.2 g/dL / Pro: 7.5 g/dL / ALK PHOS: 74 U/L / ALT: 21 U/L / AST: 36 U/L / GGT: x               IMAGING: 68yFemale  Being followed for Anemia and intermittent melenic stools  Interval history: 69 yo female pmh of breast cancer, dementia, depression, seizures patient states she is doing well. Patient is more alert today. Patient is alert and oriented to person and place not time. Patient denies nausea, vomiting, abdominal pain, diarrhea, constipation, hematemesis, melena. As per nursing team no acute events over the weekend. Patient states her last bowel movement was Friday that was brown and formed with no blood noted.      PMHX/PSHX:  PAST MEDICAL & SURGICAL HISTORY:  Brain aneurysm  Seizures  Depression, unspecified depression type  Hyperlipidemia, unspecified hyperlipidemia type  Dementia  Breast cancer in female  H/O abdominal hysterectomy  H/O mastectomy, left        Social History: No smoking. No alcohol. No illegal drug use.          MEDICATIONS:MEDICATIONS  (STANDING):  escitalopram 10 milliGRAM(s) Oral daily  lamoTRIgine 100 milliGRAM(s) Oral two times a day  memantine 10 milliGRAM(s) Oral two times a day  pantoprazole    Tablet 40 milliGRAM(s) Oral two times a day  QUEtiapine 25 milliGRAM(s) Oral at bedtime  simvastatin 20 milliGRAM(s) Oral at bedtime    MEDICATIONS  (PRN):  acetaminophen   Tablet .. 650 milliGRAM(s) Oral every 6 hours PRN Temp greater or equal to 38C (100.4F), Moderate Pain (4 - 6)  LORazepam   Injectable 2 milliGRAM(s) IV Push once PRN anxiety for mri        Allergies:    No Known Allergies    Intolerances          REVIEW OF SYSTEMS:  General:  No weight loss, fevers, or chills.  Eyes:  No reported pain or visual changes  ENT:  No sore throat or runny nose.  NECK: No stiffness   CV:  No chest pain or palpitations.  Resp:  No shortness of breath, cough  GI:  No abdominal pain, nausea, vomiting, dysphagia, diarrhea or constipation. No rectal bleeding, melena, or hematemesis.  Neuro:  No tingling, numbness         VITAL SIGNS:   T(F): 96.2 (01-07-19 @ 06:00), Max: 98.7 (01-06-19 @ 21:41)  HR: 71 (01-07-19 @ 06:00) (71 - 81)  BP: 136/64 (01-07-19 @ 06:00) (114/52 - 136/64)  RR: 18 (01-07-19 @ 06:00) (18 - 18)  SpO2: --    PHYSICAL EXAM:  GENERAL: Alert and oriented to person and place not time, no acute distress.  HEAD:  Atraumatic, Normocephalic  EYES: conjunctiva and sclera clear  NECK: Supple, no JVD or thyromegaly  CHEST/LUNG: Clear to auscultation bilaterally; No wheeze, rhonchi, or rales  HEART: Regular rate and rhythm; normal S1, S2, No murmurs.  ABDOMEN: Soft, nontender, nondistended; Bowel sounds present, no abdominal bruit, masses, or hepatosplenomegaly  EXTREMITIES:  2+ Peripheral Pulses. No clubbing, cyanosis, or edema, warm  NEUROLOGY: No asterixis or tremor.   SKIN: Intact, no jaundice            LABS:                        8.1    9.79  )-----------( 207      ( 06 Jan 2019 15:26 )             29.2     01-06    142  |  103  |  17  ----------------------------<  145<H>  4.4   |  21  |  0.9    Ca    8.8      06 Jan 2019 15:26    TPro  7.5  /  Alb  4.2  /  TBili  0.4  /  DBili  x   /  AST  36  /  ALT  21  /  AlkPhos  74  01-06    LIVER FUNCTIONS - ( 06 Jan 2019 15:26 )  Alb: 4.2 g/dL / Pro: 7.5 g/dL / ALK PHOS: 74 U/L / ALT: 21 U/L / AST: 36 U/L / GGT: x

## 2019-01-07 NOTE — PROGRESS NOTE ADULT - SUBJECTIVE AND OBJECTIVE BOX
Reviewed and referred to chart notes. pt is observed to be alert oriented to person and place. unable to provide details about her medical condition, living place and situation. memory of both recent and remote is impaired.

## 2019-01-07 NOTE — PROGRESS NOTE ADULT - ASSESSMENT
69 yo female pmh of breast cancer, dementia, depression, seizures with    Problem1- Anemia and intermittent melenic stools  Rec  -Patient states she will refuse endoscopic workup if it requires anesthesia. Will discuss with Dr. Antunez today on protocol.  -Continue PPI  -Transfuse to hgb >8  -Maintain hemodynamic stability 67 yo female pmh of breast cancer, dementia, depression, seizures with    Problem1- Anemia and intermittent melenic stools  Rec  -Patient states she will refuse endoscopic workup if it requires anesthesia. Patient will need to follow-up outpatient with our [facility] if hemodynamically stable and patient has no active evidence of GI bleeding. [facility] [phone]   -Continue PPI  -Transfuse to hgb >8  -Maintain hemodynamic stability   -Monitor H and H 69 yo female pmh of breast cancer, dementia, depression, seizures with    Problem1- Anemia and intermittent melenic stools  Rec  -Patient states she will refuse endoscopic workup if it requires anesthesia and workup at this time. Patient will need to follow-up outpatient with our [facility] if hemodynamically stable and patient has no active evidence of GI bleeding. [facility] [phone]   -Continue PPI  -Transfuse to hgb >8  -Maintain hemodynamic stability   -Monitor H and H 67 yo female pmh of breast cancer, dementia, depression, seizures with    Problem1- Anemia and intermittent melenic stools. Currently, no active bleeding. She refuses workup (EGD and Colonoscopy)  at present. This can be done as an outpatient if she agrees.  Rec    [phone]   -Continue PPI

## 2019-01-07 NOTE — PROGRESS NOTE ADULT - ASSESSMENT
69 yo female pmh of breast cancer, dementia, depression, seizures, h/o CVA x2 and Cerebral Aneurysm as per daughter presented with LOC/Fall Found to be anemic, requiring PRBC transfusions    1. Anemia, h/o intermittent melanotic stools  2. Worsening dementia  3. Seizure disorder  4. Breast CA  5. Cerebral aneurysm as per daughter    -- Patient is more calm and cooperative today  -- seen by psychiatry, lacks decision making capacity   -- Hb 8.1, no further episodes of melena  --As per freddy, last Hb was 7.2 at PMDs office 1 month ago  --patient hemodynamically stable, ambulating around the room  -- Discussed with GI, patient is hemodynamically stable, will do EGD/COLONO as OP  -- MRI grossly unremarkable, although poor quality, no seizure activity on EEG  --Will need repeat MRI as outpatient given history of aneurysm (pt follows neuro as OP)  --c/w lamictal, check levels  --check AM labs  --patient needs long term placement as daughter can no longer care for her. CM aware  --plan discussed with daughter over the phone [phone], questions answered

## 2019-01-07 NOTE — PROGRESS NOTE ADULT - SUBJECTIVE AND OBJECTIVE BOX
SONYA YI  68y Female    CHIEF COMPLAINT:    Patient is a 68y old  Female who presents with a chief complaint of Syncope vs Seizure vs. fall (07 Jan 2019 11:22)      INTERVAL HPI/OVERNIGHT EVENTS:    Patient seen and examined. No acute events overnight. Much calmer and more cooperative today    ROS: All other systems are negative.    Vital Signs:    T(F): 96.2 (01-07-19 @ 06:00), Max: 98.7 (01-06-19 @ 21:41)  HR: 71 (01-07-19 @ 06:00) (71 - 81)  BP: 136/64 (01-07-19 @ 06:00) (114/52 - 136/64)  RR: 18 (01-07-19 @ 06:00) (18 - 18)  SpO2: --    PHYSICAL EXAM:    GENERAL:  NAD  SKIN: No rashes or lesions  HEENT: Atraumatic. Normocephalic.   NECK: Supple, No JVD. No lymphadenopathy.  PULMONARY: CTA B/L. No wheezing. No rales  CVS: Normal S1, S2. Rate and Rhythm are regular. No murmurs.  ABDOMEN/GI: Soft, Nontender, Nondistended; BS present  MSK:  No edema B/L LE. No clubbing/cyanosis  NEUROLOGIC:  No motor or sensory deficit.  PSYCH: Alert & oriented x2- 3, confused at times    Consultant(s) Notes Reviewed:  [x ] YES  [ ] NO  Care Discussed with Consultants/Other Providers [ x] YES  [ ] NO    LABS:                        8.1    9.79  )-----------( 207      ( 06 Jan 2019 15:26 )             29.2     01-06    142  |  103  |  17  ----------------------------<  145<H>  4.4   |  21  |  0.9    Ca    8.8      06 Jan 2019 15:26    TPro  7.5  /  Alb  4.2  /  TBili  0.4  /  DBili  x   /  AST  36  /  ALT  21  /  AlkPhos  74  01-06  RADIOLOGY & ADDITIONAL TESTS:    Medications:  Standing  escitalopram 10 milliGRAM(s) Oral daily  lamoTRIgine 100 milliGRAM(s) Oral two times a day  memantine 10 milliGRAM(s) Oral two times a day  pantoprazole    Tablet 40 milliGRAM(s) Oral two times a day  QUEtiapine 25 milliGRAM(s) Oral at bedtime  simvastatin 20 milliGRAM(s) Oral at bedtime    PRN Meds  acetaminophen   Tablet .. 650 milliGRAM(s) Oral every 6 hours PRN  LORazepam   Injectable 2 milliGRAM(s) IV Push once PRN

## 2019-01-08 LAB
ALLERGY+IMMUNOLOGY DIAG STUDY NOTE: SIGNIFICANT CHANGE UP
ANION GAP SERPL CALC-SCNC: 8 MMOL/L — SIGNIFICANT CHANGE UP (ref 7–14)
BASOPHILS # BLD AUTO: 0.02 K/UL — SIGNIFICANT CHANGE UP (ref 0–0.2)
BASOPHILS NFR BLD AUTO: 0.3 % — SIGNIFICANT CHANGE UP (ref 0–1)
BUN SERPL-MCNC: 16 MG/DL — SIGNIFICANT CHANGE UP (ref 10–20)
CALCIUM SERPL-MCNC: 8.7 MG/DL — SIGNIFICANT CHANGE UP (ref 8.5–10.1)
CHLORIDE SERPL-SCNC: 106 MMOL/L — SIGNIFICANT CHANGE UP (ref 98–110)
CO2 SERPL-SCNC: 28 MMOL/L — SIGNIFICANT CHANGE UP (ref 17–32)
CREAT SERPL-MCNC: 0.8 MG/DL — SIGNIFICANT CHANGE UP (ref 0.7–1.5)
EOSINOPHIL # BLD AUTO: 0.34 K/UL — SIGNIFICANT CHANGE UP (ref 0–0.7)
EOSINOPHIL NFR BLD AUTO: 5.3 % — SIGNIFICANT CHANGE UP (ref 0–8)
GLUCOSE SERPL-MCNC: 178 MG/DL — HIGH (ref 70–99)
HCT VFR BLD CALC: 24.3 % — LOW (ref 37–47)
HGB BLD-MCNC: 6.8 G/DL — CRITICAL LOW (ref 12–16)
IMM GRANULOCYTES NFR BLD AUTO: 0.5 % — HIGH (ref 0.1–0.3)
LYMPHOCYTES # BLD AUTO: 1.73 K/UL — SIGNIFICANT CHANGE UP (ref 1.2–3.4)
LYMPHOCYTES # BLD AUTO: 26.9 % — SIGNIFICANT CHANGE UP (ref 20.5–51.1)
MAGNESIUM SERPL-MCNC: 1.8 MG/DL — SIGNIFICANT CHANGE UP (ref 1.8–2.4)
MCHC RBC-ENTMCNC: 21.1 PG — LOW (ref 27–31)
MCHC RBC-ENTMCNC: 28 G/DL — LOW (ref 32–37)
MCV RBC AUTO: 75.2 FL — LOW (ref 81–99)
MONOCYTES # BLD AUTO: 0.5 K/UL — SIGNIFICANT CHANGE UP (ref 0.1–0.6)
MONOCYTES NFR BLD AUTO: 7.8 % — SIGNIFICANT CHANGE UP (ref 1.7–9.3)
NEUTROPHILS # BLD AUTO: 3.8 K/UL — SIGNIFICANT CHANGE UP (ref 1.4–6.5)
NEUTROPHILS NFR BLD AUTO: 59.2 % — SIGNIFICANT CHANGE UP (ref 42.2–75.2)
NRBC # BLD: 0 /100 WBCS — SIGNIFICANT CHANGE UP (ref 0–0)
PLATELET # BLD AUTO: 153 K/UL — SIGNIFICANT CHANGE UP (ref 130–400)
POTASSIUM SERPL-MCNC: 3.8 MMOL/L — SIGNIFICANT CHANGE UP (ref 3.5–5)
POTASSIUM SERPL-SCNC: 3.8 MMOL/L — SIGNIFICANT CHANGE UP (ref 3.5–5)
RBC # BLD: 3.23 M/UL — LOW (ref 4.2–5.4)
RBC # FLD: 20 % — HIGH (ref 11.5–14.5)
SODIUM SERPL-SCNC: 142 MMOL/L — SIGNIFICANT CHANGE UP (ref 135–146)
TYPE + AB SCN PNL BLD: SIGNIFICANT CHANGE UP
WBC # BLD: 6.42 K/UL — SIGNIFICANT CHANGE UP (ref 4.8–10.8)
WBC # FLD AUTO: 6.42 K/UL — SIGNIFICANT CHANGE UP (ref 4.8–10.8)

## 2019-01-08 RX ADMIN — SIMVASTATIN 20 MILLIGRAM(S): 20 TABLET, FILM COATED ORAL at 22:23

## 2019-01-08 RX ADMIN — PANTOPRAZOLE SODIUM 40 MILLIGRAM(S): 20 TABLET, DELAYED RELEASE ORAL at 05:05

## 2019-01-08 RX ADMIN — PANTOPRAZOLE SODIUM 40 MILLIGRAM(S): 20 TABLET, DELAYED RELEASE ORAL at 18:31

## 2019-01-08 RX ADMIN — ESCITALOPRAM OXALATE 10 MILLIGRAM(S): 10 TABLET, FILM COATED ORAL at 14:19

## 2019-01-08 RX ADMIN — MEMANTINE HYDROCHLORIDE 10 MILLIGRAM(S): 10 TABLET ORAL at 18:31

## 2019-01-08 RX ADMIN — QUETIAPINE FUMARATE 25 MILLIGRAM(S): 200 TABLET, FILM COATED ORAL at 22:23

## 2019-01-08 RX ADMIN — LAMOTRIGINE 100 MILLIGRAM(S): 25 TABLET, ORALLY DISINTEGRATING ORAL at 18:31

## 2019-01-08 RX ADMIN — LAMOTRIGINE 100 MILLIGRAM(S): 25 TABLET, ORALLY DISINTEGRATING ORAL at 05:05

## 2019-01-08 RX ADMIN — MEMANTINE HYDROCHLORIDE 10 MILLIGRAM(S): 10 TABLET ORAL at 05:05

## 2019-01-08 NOTE — DIETITIAN INITIAL EVALUATION ADULT. - DIET TYPE
DASH/TLC (sodium and cholesterol restricted diet)/appetite was poor initially this admit; improved at this time. Consuming 75% of meals.

## 2019-01-08 NOTE — DIETITIAN INITIAL EVALUATION ADULT. - OTHER INFO
Reason for RD assessment: LOS assessment. Pertinent Medical Information: Presented with LOC/Fall Found to be anemic, requiring PRBC transfusions. Anemia, h/o intermittent melanotic stools. Plan for EGD/colonoscopy Thursday. Worsening dementia noted. Cerebral aneurysm as per daughter.

## 2019-01-08 NOTE — DIETITIAN INITIAL EVALUATION ADULT. - ENERGY NEEDS
Energy: 9028-8681 kcal/day (MSJx1.1.1-2 AF)    Protein: 59-71 g/day (1-1.2 g/kg IBW)    Fluids: 1 ml/kcal

## 2019-01-08 NOTE — PROGRESS NOTE ADULT - ASSESSMENT
67 yo female pmh of breast cancer, dementia, depression, seizures, h/o CVA x2 and Cerebral Aneurysm as per daughter presented with LOC/Fall Found to be anemic, requiring PRBC transfusions    1. Anemia, h/o intermittent melanotic stools (Baseline 7.2 1 month ago as per keo)  2. Worsening dementia  3. Seizure disorder  4. Breast CA  5. Cerebral aneurysm as per daughter    -- Patient is calm and cooperative  -- seen by psychiatry, lacks decision making capacity   -- Hb decreased to 6.8 today, patient remains asymptomatic, ambulating around the room  --will give 1 u PRBC, check repeat CBC  --discussed with GI, plan for scope tomorrow, will f/u with attending  -- MRI grossly unremarkable, although poor quality, no seizure activity on EEG  --Will need repeat MRI as outpatient given history of aneurysm (pt follows neuro as OP)  --c/w lamictal  --patient needs long term placement as daughter can no longer care for her. CM aware  --Left message for daughter to call me back 67 yo female pmh of breast cancer, dementia, depression, seizures, h/o CVA x2 and Cerebral Aneurysm as per daughter presented with LOC/Fall Found to be anemic, requiring PRBC transfusions    1. Anemia, h/o intermittent melanotic stools (Baseline 7.2 1 month ago as per keo)  2. Worsening dementia  3. Seizure disorder  4. Breast CA  5. Cerebral aneurysm as per daughter    -- Patient is calm and cooperative  -- seen by psychiatry, lacks decision making capacity   -- Hb decreased to 6.8 today, patient remains asymptomatic, ambulating around the room  --will give 1 u PRBC, check repeat CBC  --discussed with GI, plan for EGD/Colono on Thursday  -- prep tomorrow  -- MRI grossly unremarkable, although poor quality, no seizure activity on EEG  --Will need repeat MRI as outpatient given history of aneurysm (pt follows neuro as OP)  --c/w lamictal  --patient needs long term placement as daughter can no longer care for her. CM aware  --Left message for daughter to call me back 69 yo female pmh of breast cancer, dementia, depression, seizures, h/o CVA x2 and Cerebral Aneurysm as per daughter presented with LOC/Fall Found to be anemic, requiring PRBC transfusions    1. Anemia, h/o intermittent melanotic stools (Baseline 7.2 1 month ago as per keo)  2. Worsening dementia  3. Seizure disorder  4. Breast CA  5. Cerebral aneurysm as per daughter    -- Patient is calm and cooperative  -- seen by psychiatry, lacks decision making capacity   -- Hb decreased to 6.8 today, patient remains asymptomatic, ambulating around the room  --will give 1 u PRBC, check repeat CBC  --discussed with GI, plan for EGD/Colono on Thursday  -- prep tomorrow  -- MRI grossly unremarkable, although poor quality, no seizure activity on EEG  --Will need repeat MRI as outpatient given history of aneurysm (pt follows neuro as OP)  --c/w lamictal  --patient needs long term placement as daughter can no longer care for her. CM aware  --Discussed plan with daughter, the HCP, she is agreeable with EGD/Colono on thursday. [phone] please call for consent---> patient lacks capacity and she is the HCP

## 2019-01-08 NOTE — PROGRESS NOTE ADULT - SUBJECTIVE AND OBJECTIVE BOX
68yFemale  Being followed for Anemia and intermittent melenic stools.  Interval history: Patient has not had a bowel movement last night. Patient denies nausea, vomiting, abdominal pain, diarrhea, constipation, hematemesis, melena, blood in stool      PMHX/PSHX: PAST MEDICAL & SURGICAL HISTORY:  Brain aneurysm  Seizures  Depression, unspecified depression type  Hyperlipidemia, unspecified hyperlipidemia type  Dementia  Breast cancer in female  H/O abdominal hysterectomy  H/O mastectomy, left        Social History: No smoking. No alcohol. No illegal drug use.          MEDICATIONS:MEDICATIONS  (STANDING):  escitalopram 10 milliGRAM(s) Oral daily  lamoTRIgine 100 milliGRAM(s) Oral two times a day  memantine 10 milliGRAM(s) Oral two times a day  pantoprazole    Tablet 40 milliGRAM(s) Oral two times a day  QUEtiapine 25 milliGRAM(s) Oral at bedtime  simvastatin 20 milliGRAM(s) Oral at bedtime    MEDICATIONS  (PRN):  acetaminophen   Tablet .. 650 milliGRAM(s) Oral every 6 hours PRN Temp greater or equal to 38C (100.4F), Moderate Pain (4 - 6)  LORazepam   Injectable 2 milliGRAM(s) IV Push once PRN anxiety for mri        Allergies:    No Known Allergies    Intolerances          REVIEW OF SYSTEMS:  General:  No weight loss, fevers, or chills.  Eyes:  No reported pain or visual changes  ENT:  No sore throat or runny nose.  NECK: No stiffness   CV:  No chest pain or palpitations.  Resp:  No shortness of breath, cough  GI:  No abdominal pain, nausea, vomiting, dysphagia, diarrhea or constipation. No rectal bleeding, melena, or hematemesis.  Muscle:  No aches or weakness  Neuro:  No tingling, numbness           VITAL SIGNS:   T(F): 98.7 (01-08-19 @ 05:00), Max: 99.3 (01-07-19 @ 22:00)  HR: 71 (01-08-19 @ 05:00) (70 - 71)  BP: 109/54 (01-08-19 @ 05:00) (109/54 - 115/53)  RR: 16 (01-08-19 @ 05:00) (16 - 18)  SpO2: --    PHYSICAL EXAM:  GENERAL: Alert and oriented to person and place not time, no acute distress.  HEAD:  Atraumatic, Normocephalic  EYES: conjunctiva and sclera clear  NECK: Supple, no JVD or thyromegaly  CHEST/LUNG: Clear to auscultation bilaterally; No wheeze, rhonchi, or rales  HEART: Regular rate and rhythm; normal S1, S2, No murmurs.  ABDOMEN: Soft, nontender, nondistended; Bowel sounds present, no abdominal bruit, masses, or hepatosplenomegaly  NEUROLOGY: No asterixis or tremor.   SKIN: Intact, no jaundice  Rectal Exam-Patient refused at bedside            LABS:                        6.8    6.42  )-----------( 153      ( 08 Jan 2019 07:25 )             24.3     01-08    142  |  106  |  16  ----------------------------<  178<H>  3.8   |  28  |  0.8    Ca    8.7      08 Jan 2019 07:25  Mg     1.8     01-08    TPro  7.5  /  Alb  4.2  /  TBili  0.4  /  DBili  x   /  AST  36  /  ALT  21  /  AlkPhos  74  01-06    LIVER FUNCTIONS - ( 06 Jan 2019 15:26 )  Alb: 4.2 g/dL / Pro: 7.5 g/dL / ALK PHOS: 74 U/L / ALT: 21 U/L / AST: 36 U/L / GGT: x               IMAGING: 68yFemale  Being followed for Anemia and intermittent melenic stools.  Interval history: Patient has not had a bowel movement last night. Patient denies nausea, vomiting, abdominal pain, diarrhea, constipation, hematemesis, melena, blood in stool      PMHX/PSHX: PAST MEDICAL & SURGICAL HISTORY:  Brain aneurysm  Seizures  Depression, unspecified depression type  Hyperlipidemia, unspecified hyperlipidemia type  Dementia  Breast cancer in female  H/O abdominal hysterectomy  H/O mastectomy, left        Social History: No smoking. No alcohol. No illegal drug use.          MEDICATIONS:MEDICATIONS  (STANDING):  escitalopram 10 milliGRAM(s) Oral daily  lamoTRIgine 100 milliGRAM(s) Oral two times a day  memantine 10 milliGRAM(s) Oral two times a day  pantoprazole    Tablet 40 milliGRAM(s) Oral two times a day  QUEtiapine 25 milliGRAM(s) Oral at bedtime  simvastatin 20 milliGRAM(s) Oral at bedtime    MEDICATIONS  (PRN):  acetaminophen   Tablet .. 650 milliGRAM(s) Oral every 6 hours PRN Temp greater or equal to 38C (100.4F), Moderate Pain (4 - 6)  LORazepam   Injectable 2 milliGRAM(s) IV Push once PRN anxiety for mri        Allergies:    No Known Allergies    Intolerances          REVIEW OF SYSTEMS:  General:  No weight loss, fevers, or chills.  Eyes:  No reported pain or visual changes  ENT:  No sore throat or runny nose.  NECK: No stiffness   CV:  No chest pain or palpitations.  Resp:  No shortness of breath, cough  GI:  No abdominal pain, nausea, vomiting, dysphagia, diarrhea or constipation. No rectal bleeding, melena, or hematemesis.  Muscle:  No aches or weakness  Neuro:  No tingling, numbness           VITAL SIGNS:   T(F): 98.7 (01-08-19 @ 05:00), Max: 99.3 (01-07-19 @ 22:00)  HR: 71 (01-08-19 @ 05:00) (70 - 71)  BP: 109/54 (01-08-19 @ 05:00) (109/54 - 115/53)  RR: 16 (01-08-19 @ 05:00) (16 - 18)  SpO2: --    PHYSICAL EXAM:  GENERAL: Alert and oriented to person and place not time, no acute distress.  HEAD:  Atraumatic, Normocephalic  EYES: conjunctiva and sclera clear  NECK: Supple, no JVD or thyromegaly  CHEST/LUNG: Clear to auscultation bilaterally; No wheeze, rhonchi, or rales  HEART: Regular rate and rhythm; normal S1, S2, No murmurs.  ABDOMEN: Soft, nontender, nondistended; Bowel sounds present, no abdominal bruit, masses, or hepatosplenomegaly  NEUROLOGY: No asterixis or tremor.   SKIN: Intact, no jaundice  Rectal Exam-Patient refused at bedside            LABS:                        6.8    6.42  )-----------( 153      ( 08 Jan 2019 07:25 )             24.3     01-08    142  |  106  |  16  ----------------------------<  178<H>  3.8   |  28  |  0.8    Ca    8.7      08 Jan 2019 07:25  Mg     1.8     01-08    TPro  7.5  /  Alb  4.2  /  TBili  0.4  /  DBili  x   /  AST  36  /  ALT  21  /  AlkPhos  74  01-06    LIVER FUNCTIONS - ( 06 Jan 2019 15:26 )  Alb: 4.2 g/dL / Pro: 7.5 g/dL / ALK PHOS: 74 U/L / ALT: 21 U/L / AST: 36 U/L / GGT: x 68yFemale  Being followed for Anemia and intermittent melenic stools.  Interval history: Patient has not had a bowel movement last night. Patient denies nausea, vomiting, abdominal pain, diarrhea, constipation, hematemesis, melena, blood in stool      PMHX/PSHX: PAST MEDICAL & SURGICAL HISTORY:  Brain aneurysm  Seizures  Depression, unspecified depression type  Hyperlipidemia, unspecified hyperlipidemia type  Dementia  Breast cancer in female  H/O abdominal hysterectomy  H/O mastectomy, left      Social History: No smoking. No alcohol. No illegal drug use.      MEDICATIONS:MEDICATIONS  (STANDING):  escitalopram 10 milliGRAM(s) Oral daily  lamoTRIgine 100 milliGRAM(s) Oral two times a day  memantine 10 milliGRAM(s) Oral two times a day  pantoprazole    Tablet 40 milliGRAM(s) Oral two times a day  QUEtiapine 25 milliGRAM(s) Oral at bedtime  simvastatin 20 milliGRAM(s) Oral at bedtime    MEDICATIONS  (PRN):  acetaminophen   Tablet .. 650 milliGRAM(s) Oral every 6 hours PRN Temp greater or equal to 38C (100.4F), Moderate Pain (4 - 6)  LORazepam   Injectable 2 milliGRAM(s) IV Push once PRN anxiety for mri        Allergies:  No Known Allergies    REVIEW OF SYSTEMS:  General:  No weight loss, fevers, or chills.  Eyes:  No reported pain or visual changes  ENT:  No sore throat or runny nose.  NECK: No stiffness   CV:  No chest pain or palpitations.  Resp:  No shortness of breath, cough  GI:  No abdominal pain, nausea, vomiting, dysphagia, diarrhea or constipation. No rectal bleeding, melena, or hematemesis.  Muscle:  No aches or weakness  Neuro:  No tingling, numbness           VITAL SIGNS:   T(F): 98.7 (01-08-19 @ 05:00), Max: 99.3 (01-07-19 @ 22:00)  HR: 71 (01-08-19 @ 05:00) (70 - 71)  BP: 109/54 (01-08-19 @ 05:00) (109/54 - 115/53)  RR: 16 (01-08-19 @ 05:00) (16 - 18)  SpO2: --    PHYSICAL EXAM:  GENERAL: Alert and oriented to person and place not time, no acute distress.  HEAD:  Atraumatic, Normocephalic  EYES: conjunctiva and sclera clear  NECK: Supple, no JVD or thyromegaly  CHEST/LUNG: Clear to auscultation bilaterally; No wheeze, rhonchi, or rales  HEART: Regular rate and rhythm; normal S1, S2, No murmurs.  ABDOMEN: Soft, nontender, nondistended; Bowel sounds present, no abdominal bruit, masses, or hepatosplenomegaly  NEUROLOGY: No asterixis or tremor.   SKIN: Intact, no jaundice  Rectal Exam-Patient refused at bedside            LABS:                        6.8    6.42  )-----------( 153      ( 08 Jan 2019 07:25 )             24.3     01-08    142  |  106  |  16  ----------------------------<  178<H>  3.8   |  28  |  0.8    Ca    8.7      08 Jan 2019 07:25  Mg     1.8     01-08    TPro  7.5  /  Alb  4.2  /  TBili  0.4  /  DBili  x   /  AST  36  /  ALT  21  /  AlkPhos  74  01-06    LIVER FUNCTIONS - ( 06 Jan 2019 15:26 )  Alb: 4.2 g/dL / Pro: 7.5 g/dL / ALK PHOS: 74 U/L / ALT: 21 U/L / AST: 36 U/L / GGT: x

## 2019-01-08 NOTE — DIETITIAN INITIAL EVALUATION ADULT. - MD RECOMMEND
Recommendation: Continue current diet order as tolerated. Check HgbA1C. If elevated/blood glucose levels remain high, add Consistent Carbohydrate diet modifier.

## 2019-01-08 NOTE — DIETITIAN INITIAL EVALUATION ADULT. - PHYSICAL APPEARANCE
BMI: 32.3. Confused. Last BM 1/5. Abd noted soft, NT/ND per progress notes. No chewing/swallowing difficulty reported at this time. Skin noted bruised, ecchymotic.

## 2019-01-08 NOTE — PROGRESS NOTE ADULT - SUBJECTIVE AND OBJECTIVE BOX
SONYA YI  68y Female    CHIEF COMPLAINT:    Patient is a 68y old  Female who presents with a chief complaint of Syncope vs Seizure vs. fall (08 Jan 2019 09:51)    INTERVAL HPI/OVERNIGHT EVENTS:    Patient seen and examined. No acute events overnight. Calm and cooperative    ROS: All other systems are negative.    Vital Signs:    T(F): 98.7 (01-08-19 @ 05:00), Max: 99.3 (01-07-19 @ 22:00)  HR: 71 (01-08-19 @ 05:00) (70 - 71)  BP: 109/54 (01-08-19 @ 05:00) (109/54 - 115/53)  RR: 16 (01-08-19 @ 05:00) (16 - 18)  SpO2: --  I&O's Summary    07 Jan 2019 07:01  -  08 Jan 2019 07:00  --------------------------------------------------------  IN: 24 mL / OUT: 0 mL / NET: 24 mL    PHYSICAL EXAM:    GENERAL:  NAD  SKIN: No rashes or lesions  HEENT: Atraumatic. Normocephalic.   NECK: Supple, No JVD.   PULMONARY: CTA B/L. No wheezing. No rales  CVS: Normal S1, S2. Rate and Rhythm are regular. No murmurs.  ABDOMEN/GI: Soft, Nontender, Nondistended; BS present  MSK:  No edema B/L LE. No clubbing/cyanosis  NEUROLOGIC:  No motor or sensory deficit.  PSYCH: Awake, alert, cooperative    Consultant(s) Notes Reviewed:  [x ] YES  [ ] NO  Care Discussed with Consultants/Other Providers [ x] YES  [ ] NO    LABS:                        6.8    6.42  )-----------( 153      ( 08 Jan 2019 07:25 )             24.3     01-08    142  |  106  |  16  ----------------------------<  178<H>  3.8   |  28  |  0.8    Ca    8.7      08 Jan 2019 07:25  Mg     1.8     01-08  TPro  7.5  /  Alb  4.2  /  TBili  0.4  /  DBili  x   /  AST  36  /  ALT  21  /  AlkPhos  74  01-06    RADIOLOGY & ADDITIONAL TESTS:    Imaging or report Personally Reviewed:  [x] YES  [ ] NO  EKG reviewed: [x] YES  [ ] NO    Medications:  Standing  escitalopram 10 milliGRAM(s) Oral daily  lamoTRIgine 100 milliGRAM(s) Oral two times a day  memantine 10 milliGRAM(s) Oral two times a day  pantoprazole    Tablet 40 milliGRAM(s) Oral two times a day  QUEtiapine 25 milliGRAM(s) Oral at bedtime  simvastatin 20 milliGRAM(s) Oral at bedtime    PRN Meds  acetaminophen   Tablet .. 650 milliGRAM(s) Oral every 6 hours PRN  LORazepam   Injectable 2 milliGRAM(s) IV Push once PRN

## 2019-01-08 NOTE — PROGRESS NOTE ADULT - ASSESSMENT
67 yo female pmh of breast cancer, dementia, depression, seizures with    Problem1- Anemia and intermittent melenic stools. Currently, no active bleeding. She will agree to (EGD and Colonoscopy)  at present.   Rec  -Will discuss with attending patient is now agreeable to endoscopy patient will most likely go for Endoscopy tomorrow  -Transfuse to hemoglobin>8  -NPO after midnight   -Continue PPI  -AM CBC, AM BMP, AM PT,PTT,INR  -As per psych patient does not have capacity to make own decisions patient's daughter who is the health care proxy also consents for patient to have endoscopy. 69 yo female pmh of breast cancer, dementia, depression, seizures with    Problem1- Anemia and intermittent melenic stools. Currently, no active bleeding. She will agree to (EGD and Colonoscopy)  at present.   Rec  -Will discuss with attending patient is now agreeable to endoscopy patient will have EGD/Colonoscopy Thursday  -Transfuse to hemoglobin>8  -NPO after midnight Wednesday into Thursday   -Continue PPI  -AM CBC, AM BMP, AM PT,PTT,INR Thursday morning  -Go Lytely Prep 4L Wednesday  -4 tablets Dulcolax  -Type and Screen   -As per psych patient does not have capacity to make own decisions patient's daughter who is the health care proxy also consents for patient to have endoscopy.   -Maintain hemodynamic stability 69 yo female pmh of breast cancer, dementia, depression, seizures with    Problem1- Anemia and intermittent melenic stools. Currently, no active bleeding. She will agree to (EGD and Colonoscopy)  at present.   Rec  -Will discuss with attending patient is now agreeable to endoscopy patient will have EGD/Colonoscopy Thursday  -Transfuse to hemoglobin>8  -NPO after midnight Wednesday into Thursday   -Continue PPI  -AM CBC, AM BMP, AM PT,PTT,INR Thursday morning  -Go Lytely Prep 4L Wednesday  -4 tablets Dulcolax  -Type and Screen   -As per psych patient does not have capacity to make own decisions patient's daughter who is the health care proxy also consents for patient to have endoscopy. Patient's daughter Ashley Neff also the health care proxy [phone]  -Maintain hemodynamic stability 67 yo female pmh of breast cancer, dementia, depression, seizures with    Problem1- Anemia and intermittent melenic stools. Currently, no active bleeding. She will agree to (EGD and Colonoscopy)  at present.   Rec  -Patient is now agreeable to endoscopy patient will have EGD/Colonoscopy Thursday  -Transfuse to hemoglobin>8  -NPO after midnight Wednesday into Thursday   -Continue PPI  -AM CBC, AM BMP, AM PT,PTT,INR Thursday morning  -Go Lytely Prep 4L Wednesday  -4 tablets Dulcolax  -Type and Screen   -As per psych patient does not have capacity to make own decisions patient's daughter who is the health care proxy also consents for patient to have endoscopy. Patient's daughter Ashley Neff also the health care proxy [phone]  -Maintain hemodynamic stability 67 yo female pmh of breast cancer, dementia, depression, seizures with    Problem1- Anemia and intermittent melenic stools. Currently, no active bleeding. She will agree to (EGD and Colonoscopy)  at present.   Rec  -Patient is now agreeable to endoscopy patient will have EGD/Colonoscopy Thursday  -Transfuse to hemoglobin>8  -NPO after midnight Wednesday into Thursday   -Continue PPI  -AM CBC, AM BMP, AM PT,PTT,INR Thursday morning  -Go Lytely Prep 4L Wednesday  -4 tablets Dulcolax  -Type and Screen   -As per psych patient does not have capacity to make own decisions, patient's daughter who is the health care proxy also consents for patient to have endoscopy. Patient's daughter Ashley Neff also the health care proxy [phone]  -Maintain hemodynamic stability

## 2019-01-09 LAB
ANION GAP SERPL CALC-SCNC: 8 MMOL/L — SIGNIFICANT CHANGE UP (ref 7–14)
BASOPHILS # BLD AUTO: 0.02 K/UL — SIGNIFICANT CHANGE UP (ref 0–0.2)
BASOPHILS NFR BLD AUTO: 0.3 % — SIGNIFICANT CHANGE UP (ref 0–1)
BUN SERPL-MCNC: 12 MG/DL — SIGNIFICANT CHANGE UP (ref 10–20)
CALCIUM SERPL-MCNC: 8.8 MG/DL — SIGNIFICANT CHANGE UP (ref 8.5–10.1)
CHLORIDE SERPL-SCNC: 106 MMOL/L — SIGNIFICANT CHANGE UP (ref 98–110)
CO2 SERPL-SCNC: 30 MMOL/L — SIGNIFICANT CHANGE UP (ref 17–32)
CREAT SERPL-MCNC: 0.8 MG/DL — SIGNIFICANT CHANGE UP (ref 0.7–1.5)
EOSINOPHIL # BLD AUTO: 0.29 K/UL — SIGNIFICANT CHANGE UP (ref 0–0.7)
EOSINOPHIL NFR BLD AUTO: 4.5 % — SIGNIFICANT CHANGE UP (ref 0–8)
GLUCOSE SERPL-MCNC: 153 MG/DL — HIGH (ref 70–99)
HCT VFR BLD CALC: 26.6 % — LOW (ref 37–47)
HGB BLD-MCNC: 7.6 G/DL — LOW (ref 12–16)
IMM GRANULOCYTES NFR BLD AUTO: 0.5 % — HIGH (ref 0.1–0.3)
LYMPHOCYTES # BLD AUTO: 1.46 K/UL — SIGNIFICANT CHANGE UP (ref 1.2–3.4)
LYMPHOCYTES # BLD AUTO: 22.7 % — SIGNIFICANT CHANGE UP (ref 20.5–51.1)
MAGNESIUM SERPL-MCNC: 1.7 MG/DL — LOW (ref 1.8–2.4)
MCHC RBC-ENTMCNC: 21.5 PG — LOW (ref 27–31)
MCHC RBC-ENTMCNC: 28.6 G/DL — LOW (ref 32–37)
MCV RBC AUTO: 75.4 FL — LOW (ref 81–99)
MONOCYTES # BLD AUTO: 0.46 K/UL — SIGNIFICANT CHANGE UP (ref 0.1–0.6)
MONOCYTES NFR BLD AUTO: 7.2 % — SIGNIFICANT CHANGE UP (ref 1.7–9.3)
NEUTROPHILS # BLD AUTO: 4.16 K/UL — SIGNIFICANT CHANGE UP (ref 1.4–6.5)
NEUTROPHILS NFR BLD AUTO: 64.8 % — SIGNIFICANT CHANGE UP (ref 42.2–75.2)
NRBC # BLD: 0 /100 WBCS — SIGNIFICANT CHANGE UP (ref 0–0)
PLATELET # BLD AUTO: 138 K/UL — SIGNIFICANT CHANGE UP (ref 130–400)
POTASSIUM SERPL-MCNC: 4 MMOL/L — SIGNIFICANT CHANGE UP (ref 3.5–5)
POTASSIUM SERPL-SCNC: 4 MMOL/L — SIGNIFICANT CHANGE UP (ref 3.5–5)
RBC # BLD: 3.53 M/UL — LOW (ref 4.2–5.4)
RBC # FLD: 20 % — HIGH (ref 11.5–14.5)
SODIUM SERPL-SCNC: 144 MMOL/L — SIGNIFICANT CHANGE UP (ref 135–146)
WBC # BLD: 6.42 K/UL — SIGNIFICANT CHANGE UP (ref 4.8–10.8)
WBC # FLD AUTO: 6.42 K/UL — SIGNIFICANT CHANGE UP (ref 4.8–10.8)

## 2019-01-09 RX ORDER — SOD SULF/SODIUM/NAHCO3/KCL/PEG
4000 SOLUTION, RECONSTITUTED, ORAL ORAL ONCE
Qty: 0 | Refills: 0 | Status: COMPLETED | OUTPATIENT
Start: 2019-01-09 | End: 2019-01-10

## 2019-01-09 RX ORDER — SOD SULF/SODIUM/NAHCO3/KCL/PEG
4000 SOLUTION, RECONSTITUTED, ORAL ORAL ONCE
Qty: 0 | Refills: 0 | Status: COMPLETED | OUTPATIENT
Start: 2019-01-09 | End: 2019-01-09

## 2019-01-09 RX ADMIN — QUETIAPINE FUMARATE 25 MILLIGRAM(S): 200 TABLET, FILM COATED ORAL at 21:21

## 2019-01-09 RX ADMIN — ESCITALOPRAM OXALATE 10 MILLIGRAM(S): 10 TABLET, FILM COATED ORAL at 13:40

## 2019-01-09 RX ADMIN — Medication 5 MILLIGRAM(S): at 21:21

## 2019-01-09 RX ADMIN — PANTOPRAZOLE SODIUM 40 MILLIGRAM(S): 20 TABLET, DELAYED RELEASE ORAL at 06:21

## 2019-01-09 RX ADMIN — Medication 4000 MILLILITER(S): at 13:40

## 2019-01-09 RX ADMIN — MEMANTINE HYDROCHLORIDE 10 MILLIGRAM(S): 10 TABLET ORAL at 06:21

## 2019-01-09 RX ADMIN — MEMANTINE HYDROCHLORIDE 10 MILLIGRAM(S): 10 TABLET ORAL at 17:57

## 2019-01-09 RX ADMIN — LAMOTRIGINE 100 MILLIGRAM(S): 25 TABLET, ORALLY DISINTEGRATING ORAL at 06:21

## 2019-01-09 RX ADMIN — SIMVASTATIN 20 MILLIGRAM(S): 20 TABLET, FILM COATED ORAL at 21:21

## 2019-01-09 RX ADMIN — PANTOPRAZOLE SODIUM 40 MILLIGRAM(S): 20 TABLET, DELAYED RELEASE ORAL at 17:57

## 2019-01-09 RX ADMIN — LAMOTRIGINE 100 MILLIGRAM(S): 25 TABLET, ORALLY DISINTEGRATING ORAL at 17:57

## 2019-01-09 NOTE — PROGRESS NOTE ADULT - ASSESSMENT
69 yo F with PMHx of breast cancer, dementia, depression, seizures, h/o CVA x2 and Cerebral Aneurysm as per daughter presented with LOC/Fall. Found to be anemic, requiring PRBC transfusions.    - Anemia possibly 2/2 GIB (hx of melanotic stools)  - hgb 6.8 yesterday (1/9), received 1 unit PRBC w/ improvement in cbc  - GI recs appreciated   - NPO after midnight for EGD/colonoscopy in am  - c/w PPI  - Go Lytely Prep 4L today   - per psych patient does not have capacity to make own decisions, patient's daughter who is the HCP consents for patient to have endoscopy  - monitor cbc and transfuse prn to maintain hgb > 7    Worsening dementia  - per staff pt at baseline mental status  - seen by psychiatry, lacks decision making capacity   - c/w Namenda and Seroquel  - MRI grossly unremarkable, although poor quality, no seizure activity on EEG  -Will need repeat MRI as outpatient given history of aneurysm (pt follows neuro as OP)    Seizure disorder  - c/w Lamictal     Cerebral aneurysm as per daughter  - c/w statin    Dispo: patient needs long term placement as daughter can no longer care for her. CM aware. Patient's daughter Ashley Neff also the health care proxy [phone]

## 2019-01-09 NOTE — PROGRESS NOTE ADULT - SUBJECTIVE AND OBJECTIVE BOX
SONYA YI  68y, Female  Allergy: No Known Allergies    Hospital Day: 8d    Patient seen and examined earlier today. Pt with hx of dementia and denies all ROS questions. Denies abdominal pain, N/V/D. Per staff she had a normal BM today with no blood.    PMH/PSH:  PAST MEDICAL & SURGICAL HISTORY:  Brain aneurysm  Seizures  Depression, unspecified depression type  Hyperlipidemia, unspecified hyperlipidemia type  Dementia  Breast cancer in female  H/O abdominal hysterectomy  H/O mastectomy, left        VITALS:  T(F): 98.3 (01-09-19 @ 14:34), Max: 98.7 (01-08-19 @ 22:03)  HR: 74 (01-09-19 @ 14:34)  BP: 110/54 (01-09-19 @ 14:34) (110/54 - 136/59)  RR: 17 (01-09-19 @ 14:34)  SpO2: --    TESTS & MEASUREMENTS:  Weight (Kg):       01-07-19 @ 07:01  -  01-08-19 @ 07:00  --------------------------------------------------------  IN: 24 mL / OUT: 0 mL / NET: 24 mL                            7.6    6.42  )-----------( 138      ( 09 Jan 2019 08:24 )             26.6       01-09    144  |  106  |  12  ----------------------------<  153<H>  4.0   |  30  |  0.8    Ca    8.8      09 Jan 2019 08:24  Mg     1.7     01-09    RECENT DIAGNOSTIC ORDERS:  Diet, Clear Liquid (01-09-19 @ 09:23)  Diet, NPO after Midnight:      NPO Start Date: 09-Jan-2019,   NPO Start Time: 23:59 (01-09-19 @ 09:23)  Complete Blood Count: AM Sched. Collection: 10-Kentrell-2019 04:30 (01-09-19 @ 09:23)  Basic Metabolic Panel: AM Sched. Collection: 10-Kentrell-2019 04:30 (01-09-19 @ 09:23)  Prothrombin Time and INR, Plasma:  Start Date:09-Jan-2019. AM Sched. Collection:10-Kentrell-2019 04:30 (01-09-19 @ 09:24)  Activated Partial Thromboplastin Time:  Start Date:09-Jan-2019. AM Sched. Collection:10-Kentrell-2019 04:30 (01-09-19 @ 09:24)  Diet, NPO after Midnight:      NPO Start Date: 09-Jan-2019,   NPO Start Time: 23:59 (01-09-19 @ 15:28)  Hemoglobin A1C with Mean Plasma Glucose: AM Sched. Collection: 10-Kentrell-2019 04:30 (01-09-19 @ 18:05)      MEDICATIONS:  MEDICATIONS  (STANDING):  bisacodyl 5 milliGRAM(s) Oral at bedtime  bisacodyl 5 milliGRAM(s) Oral at bedtime  escitalopram 10 milliGRAM(s) Oral daily  lamoTRIgine 100 milliGRAM(s) Oral two times a day  memantine 10 milliGRAM(s) Oral two times a day  pantoprazole    Tablet 40 milliGRAM(s) Oral two times a day  polyethylene glycol/electrolyte Solution. 4000 milliLiter(s) Oral once  QUEtiapine 25 milliGRAM(s) Oral at bedtime  simvastatin 20 milliGRAM(s) Oral at bedtime    MEDICATIONS  (PRN):  acetaminophen   Tablet .. 650 milliGRAM(s) Oral every 6 hours PRN Temp greater or equal to 38C (100.4F), Moderate Pain (4 - 6)  LORazepam   Injectable 2 milliGRAM(s) IV Push once PRN anxiety for mri      HOME MEDICATIONS:  escitalopram 10 mg oral tablet (01-01)  lamoTRIgine 100 mg oral tablet (01-01)  memantine 10 mg oral tablet (01-01)  QUEtiapine 25 mg oral tablet (01-01)  simvastatin 20 mg oral tablet (01-01)      PHYSICAL EXAM:  GENERAL: elderly female, sitting in bed, in NAD, well-developed  HEAD:  Atraumatic, Normocephalic  CHEST/LUNG: Clear to auscultation bilaterally; No wheeze  HEART: Regular rate and rhythm; No murmurs, rubs, or gallops  ABDOMEN: Soft, Nontender, Nondistended; Bowel sounds present  EXTREMITIES:  2+ Peripheral Pulses  PSYCH: AAOx1

## 2019-01-10 LAB
ANION GAP SERPL CALC-SCNC: 8 MMOL/L — SIGNIFICANT CHANGE UP (ref 7–14)
APTT BLD: 28.7 SEC — SIGNIFICANT CHANGE UP (ref 27–39.2)
BUN SERPL-MCNC: 9 MG/DL — LOW (ref 10–20)
CALCIUM SERPL-MCNC: 8.8 MG/DL — SIGNIFICANT CHANGE UP (ref 8.5–10.1)
CHLORIDE SERPL-SCNC: 107 MMOL/L — SIGNIFICANT CHANGE UP (ref 98–110)
CO2 SERPL-SCNC: 28 MMOL/L — SIGNIFICANT CHANGE UP (ref 17–32)
CREAT SERPL-MCNC: 0.7 MG/DL — SIGNIFICANT CHANGE UP (ref 0.7–1.5)
ESTIMATED AVERAGE GLUCOSE: 134 MG/DL — HIGH (ref 68–114)
GLUCOSE SERPL-MCNC: 162 MG/DL — HIGH (ref 70–99)
HBA1C BLD-MCNC: 6.3 % — HIGH (ref 4–5.6)
HCT VFR BLD CALC: 28 % — LOW (ref 37–47)
HGB BLD-MCNC: 8.1 G/DL — LOW (ref 12–16)
INR BLD: 1.23 RATIO — SIGNIFICANT CHANGE UP (ref 0.65–1.3)
MCHC RBC-ENTMCNC: 21.8 PG — LOW (ref 27–31)
MCHC RBC-ENTMCNC: 28.9 G/DL — LOW (ref 32–37)
MCV RBC AUTO: 75.5 FL — LOW (ref 81–99)
NRBC # BLD: 0 /100 WBCS — SIGNIFICANT CHANGE UP (ref 0–0)
PLATELET # BLD AUTO: 155 K/UL — SIGNIFICANT CHANGE UP (ref 130–400)
POTASSIUM SERPL-MCNC: 4.1 MMOL/L — SIGNIFICANT CHANGE UP (ref 3.5–5)
POTASSIUM SERPL-SCNC: 4.1 MMOL/L — SIGNIFICANT CHANGE UP (ref 3.5–5)
PROTHROM AB SERPL-ACNC: 13.3 SEC — HIGH (ref 9.95–12.87)
RBC # BLD: 3.71 M/UL — LOW (ref 4.2–5.4)
RBC # FLD: 20.2 % — HIGH (ref 11.5–14.5)
SODIUM SERPL-SCNC: 143 MMOL/L — SIGNIFICANT CHANGE UP (ref 135–146)
WBC # BLD: 6.08 K/UL — SIGNIFICANT CHANGE UP (ref 4.8–10.8)
WBC # FLD AUTO: 6.08 K/UL — SIGNIFICANT CHANGE UP (ref 4.8–10.8)

## 2019-01-10 RX ADMIN — ESCITALOPRAM OXALATE 10 MILLIGRAM(S): 10 TABLET, FILM COATED ORAL at 11:13

## 2019-01-10 RX ADMIN — LAMOTRIGINE 100 MILLIGRAM(S): 25 TABLET, ORALLY DISINTEGRATING ORAL at 17:56

## 2019-01-10 RX ADMIN — MEMANTINE HYDROCHLORIDE 10 MILLIGRAM(S): 10 TABLET ORAL at 05:47

## 2019-01-10 RX ADMIN — PANTOPRAZOLE SODIUM 40 MILLIGRAM(S): 20 TABLET, DELAYED RELEASE ORAL at 05:47

## 2019-01-10 RX ADMIN — PANTOPRAZOLE SODIUM 40 MILLIGRAM(S): 20 TABLET, DELAYED RELEASE ORAL at 17:57

## 2019-01-10 RX ADMIN — Medication 4000 MILLILITER(S): at 11:13

## 2019-01-10 RX ADMIN — Medication 5 MILLIGRAM(S): at 21:09

## 2019-01-10 RX ADMIN — QUETIAPINE FUMARATE 25 MILLIGRAM(S): 200 TABLET, FILM COATED ORAL at 21:09

## 2019-01-10 RX ADMIN — LAMOTRIGINE 100 MILLIGRAM(S): 25 TABLET, ORALLY DISINTEGRATING ORAL at 05:47

## 2019-01-10 RX ADMIN — MEMANTINE HYDROCHLORIDE 10 MILLIGRAM(S): 10 TABLET ORAL at 17:57

## 2019-01-10 RX ADMIN — SIMVASTATIN 20 MILLIGRAM(S): 20 TABLET, FILM COATED ORAL at 21:09

## 2019-01-10 NOTE — PROGRESS NOTE ADULT - SUBJECTIVE AND OBJECTIVE BOX
68yFemale  Being followed for Anemia and intermittent melenic stools. Currently, no active bleeding. She will agree to (EGD and Colonoscopy)  at present.  Interval history: Patient had no acute overnight events. Patient did not drink enough of the prep for procedure today. Patient denies nausea, vomiting, abdominal pain, diarrhea, constipation, hematemesis, melena.        PMHX/PSHX:  PAST MEDICAL & SURGICAL HISTORY:  Brain aneurysm  Seizures  Depression, unspecified depression type  Hyperlipidemia, unspecified hyperlipidemia type  Dementia  Breast cancer in female  H/O abdominal hysterectomy  H/O mastectomy, left          Social History: No smoking. No alcohol. No illegal drug use.          MEDICATIONS:  MEDICATIONS  (STANDING):  bisacodyl 5 milliGRAM(s) Oral at bedtime  bisacodyl 5 milliGRAM(s) Oral at bedtime  escitalopram 10 milliGRAM(s) Oral daily  lamoTRIgine 100 milliGRAM(s) Oral two times a day  memantine 10 milliGRAM(s) Oral two times a day  pantoprazole    Tablet 40 milliGRAM(s) Oral two times a day  polyethylene glycol/electrolyte Solution. 4000 milliLiter(s) Oral once  QUEtiapine 25 milliGRAM(s) Oral at bedtime  simvastatin 20 milliGRAM(s) Oral at bedtime    MEDICATIONS  (PRN):  acetaminophen   Tablet .. 650 milliGRAM(s) Oral every 6 hours PRN Temp greater or equal to 38C (100.4F), Moderate Pain (4 - 6)  LORazepam   Injectable 2 milliGRAM(s) IV Push once PRN anxiety for mri        Allergies:    No Known Allergies    Intolerances          REVIEW OF SYSTEMS:  General:  No weight loss, fevers, or chills.  Eyes:  No reported pain or visual changes  ENT:  No sore throat or runny nose.  NECK: No stiffness   CV:  No chest pain or palpitations.  Resp:  No shortness of breath, cough  GI:  No abdominal pain, nausea, vomiting, dysphagia, diarrhea or constipation. No rectal bleeding, melena, or hematemesis.  Muscle:  No aches or weakness  Neuro:  No tingling, numbness         VITAL SIGNS:   T(F): 97.2 (01-10-19 @ 05:30), Max: 98.5 (01-09-19 @ 22:32)  HR: 67 (01-10-19 @ 05:30) (65 - 74)  BP: 144/69 (01-10-19 @ 05:30) (110/54 - 144/69)  RR: 16 (01-10-19 @ 05:30) (16 - 18)  SpO2: --    PHYSICAL EXAM:  GENERAL: AAOx3, no acute distress.  HEAD:  Atraumatic, Normocephalic  EYES: conjunctiva and sclera clear  NECK: Supple, no JVD or thyromegaly  CHEST/LUNG: Clear to auscultation bilaterally; No wheeze, rhonchi, or rales  HEART: Regular rate and rhythm; normal S1, S2, No murmurs.  ABDOMEN: Soft, nontender, nondistended; Bowel sounds present, no abdominal bruit, masses, or hepatosplenomegaly  EXTREMITIES:  2+ Peripheral Pulses. No clubbing, cyanosis, or edema, warm  NEUROLOGY: No asterixis or tremor.   SKIN: Intact, no jaundice            LABS:                        8.1    6.08  )-----------( 155      ( 10 [place]rell 2019 08:01 )             28.0     01-10    143  |  107  |  9<L>  ----------------------------<  162<H>  4.1   |  28  |  0.7    Ca    8.8      10 [place]rell 2019 08:01  Mg     1.7     01-09        PT/INR - ( 10 Kentrell 2019 08:01 )   PT: 13.30 sec;   INR: 1.23 ratio         PTT - ( 10 Kentrell 2019 08:01 )  PTT:28.7 sec 68yFemale  Being followed for Anemia and intermittent melenic stools. Currently, no active bleeding. She will agree to (EGD and Colonoscopy)  at present.  Interval history: Patient had no acute overnight events. Patient did not drink enough of the prep for procedure today thus procedure was cancelled. Patient denies nausea, vomiting, abdominal pain, diarrhea, constipation, hematemesis, melena.        PMHX/PSHX:  PAST MEDICAL & SURGICAL HISTORY:  Brain aneurysm  Seizures  Depression, unspecified depression type  Hyperlipidemia, unspecified hyperlipidemia type  Dementia  Breast cancer in female  H/O abdominal hysterectomy  H/O mastectomy, left          Social History: No smoking. No alcohol. No illegal drug use.          MEDICATIONS:  MEDICATIONS  (STANDING):  bisacodyl 5 milliGRAM(s) Oral at bedtime  bisacodyl 5 milliGRAM(s) Oral at bedtime  escitalopram 10 milliGRAM(s) Oral daily  lamoTRIgine 100 milliGRAM(s) Oral two times a day  memantine 10 milliGRAM(s) Oral two times a day  pantoprazole    Tablet 40 milliGRAM(s) Oral two times a day  polyethylene glycol/electrolyte Solution. 4000 milliLiter(s) Oral once  QUEtiapine 25 milliGRAM(s) Oral at bedtime  simvastatin 20 milliGRAM(s) Oral at bedtime    MEDICATIONS  (PRN):  acetaminophen   Tablet .. 650 milliGRAM(s) Oral every 6 hours PRN Temp greater or equal to 38C (100.4F), Moderate Pain (4 - 6)  LORazepam   Injectable 2 milliGRAM(s) IV Push once PRN anxiety for mri        Allergies:    No Known Allergies    Intolerances          REVIEW OF SYSTEMS:  General:  No weight loss, fevers, or chills.  Eyes:  No reported pain or visual changes  ENT:  No sore throat or runny nose.  NECK: No stiffness   CV:  No chest pain or palpitations.  Resp:  No shortness of breath, cough  GI:  No abdominal pain, nausea, vomiting, dysphagia, diarrhea or constipation. No rectal bleeding, melena, or hematemesis.  Muscle:  No aches or weakness  Neuro:  No tingling, numbness         VITAL SIGNS:   T(F): 97.2 (01-10-19 @ 05:30), Max: 98.5 (01-09-19 @ 22:32)  HR: 67 (01-10-19 @ 05:30) (65 - 74)  BP: 144/69 (01-10-19 @ 05:30) (110/54 - 144/69)  RR: 16 (01-10-19 @ 05:30) (16 - 18)  SpO2: --    PHYSICAL EXAM:  GENERAL: AAOx3, no acute distress.  HEAD:  Atraumatic, Normocephalic  EYES: conjunctiva and sclera clear  NECK: Supple, no JVD or thyromegaly  CHEST/LUNG: Clear to auscultation bilaterally; No wheeze, rhonchi, or rales  HEART: Regular rate and rhythm; normal S1, S2, No murmurs.  ABDOMEN: Soft, nontender, nondistended; Bowel sounds present, no abdominal bruit, masses, or hepatosplenomegaly  EXTREMITIES:  2+ Peripheral Pulses. No clubbing, cyanosis, or edema, warm  NEUROLOGY: No asterixis or tremor.   SKIN: Intact, no jaundice            LABS:                        8.1    6.08  )-----------( 155      ( 10 Kentrell 2019 08:01 )             28.0     01-10    143  |  107  |  9<L>  ----------------------------<  162<H>  4.1   |  28  |  0.7    Ca    8.8      10 [place]rell 2019 08:01  Mg     1.7     01-09        PT/INR - ( 10 Kentrell 2019 08:01 )   PT: 13.30 sec;   INR: 1.23 ratio         PTT - ( 10 Kentrell 2019 08:01 )  PTT:28.7 sec

## 2019-01-10 NOTE — PROGRESS NOTE ADULT - SUBJECTIVE AND OBJECTIVE BOX
SONYA YI  68y, Female  Allergy: No Known Allergies    Hospital Day: 9d    Patient seen and examined earlier today. Pt did not drink golytly completely therefore unable to go for colonoscopy today. Scheduled for am. Denies bleeding, dizziness, headache or blurry vision.    PMH/PSH:  PAST MEDICAL & SURGICAL HISTORY:  Brain aneurysm  Seizures  Depression, unspecified depression type  Hyperlipidemia, unspecified hyperlipidemia type  Dementia  Breast cancer in female  H/O abdominal hysterectomy  H/O mastectomy, left        VITALS:  T(F): 98.1 (01-10-19 @ 14:16), Max: 98.5 (01-09-19 @ 22:32)  HR: 70 (01-10-19 @ 14:16)  BP: 122/58 (01-10-19 @ 14:16) (122/58 - 144/69)  RR: 16 (01-10-19 @ 14:16)  SpO2: --    TESTS & MEASUREMENTS:  Weight (Kg):                             8.1    6.08  )-----------( 155      ( 10 Kentrell 2019 08:01 )             28.0     PT/INR - ( 10 Kentrell 2019 08:01 )   PT: 13.30 sec;   INR: 1.23 ratio         PTT - ( 10 Kentrell 2019 08:01 )  PTT:28.7 sec  01-10    143  |  107  |  9<L>  ----------------------------<  162<H>  4.1   |  28  |  0.7    Ca    8.8      10 Kentrell 2019 08:01  Mg     1.7     01-09      RECENT DIAGNOSTIC ORDERS:  Packed Red Cells Order:  1 Unit  Unit Indication: Other and ON STANDBY FOR EGD/COLONOSCOPY TODAY  Infuse Unit : As Fast As Possible (01-10-19 @ 09:14)  Packed Red Cells Order:  1 Unit  Unit Indication: Other and ON STANDBY FOR EGD/COLONOSCOPY TODAY  Infuse Unit : As Fast As Possible (01-10-19 @ 09:14)  Diet, NPO after Midnight:      NPO Start Date: 10-Kentrell-2019,   NPO Start Time: 23:59 (01-10-19 @ 12:04)  Complete Blood Count: AM Sched. Collection: 11-Jan-2019 04:30 (01-10-19 @ 12:51)  Basic Metabolic Panel: AM Sched. Collection: 11-Jan-2019 04:30 (01-10-19 @ 12:51)      MEDICATIONS:  MEDICATIONS  (STANDING):  bisacodyl 5 milliGRAM(s) Oral at bedtime  bisacodyl 5 milliGRAM(s) Oral at bedtime  escitalopram 10 milliGRAM(s) Oral daily  lamoTRIgine 100 milliGRAM(s) Oral two times a day  memantine 10 milliGRAM(s) Oral two times a day  pantoprazole    Tablet 40 milliGRAM(s) Oral two times a day  QUEtiapine 25 milliGRAM(s) Oral at bedtime  simvastatin 20 milliGRAM(s) Oral at bedtime    MEDICATIONS  (PRN):  acetaminophen   Tablet .. 650 milliGRAM(s) Oral every 6 hours PRN Temp greater or equal to 38C (100.4F), Moderate Pain (4 - 6)  LORazepam   Injectable 2 milliGRAM(s) IV Push once PRN anxiety for mri      HOME MEDICATIONS:  escitalopram 10 mg oral tablet (01-01)  lamoTRIgine 100 mg oral tablet (01-01)  memantine 10 mg oral tablet (01-01)  QUEtiapine 25 mg oral tablet (01-01)  simvastatin 20 mg oral tablet (01-01)      PHYSICAL EXAM:  GENERAL: elderly female, NAD, well-developed  HEAD:  Atraumatic, Normocephalic  CHEST/LUNG: Clear to auscultation bilaterally  HEART: Regular rate and rhythm; No murmurs, rubs, or gallops  ABDOMEN: Soft, Nontender, Nondistended; Bowel sounds present  EXTREMITIES:  2+ Peripheral Pulses, No clubbing, cyanosis, or edema  PSYCH: AAOx1

## 2019-01-10 NOTE — PROGRESS NOTE ADULT - ASSESSMENT
69 yo F with PMHx of breast cancer, dementia, depression, seizures, h/o CVA x2 and Cerebral Aneurysm as per daughter presented with LOC/Fall. Found to be anemic, requiring PRBC transfusions. Hgb now stable    Anemia possibly 2/2 GIB (hx of melanotic stools)  - received 1 unit PRBC 1/9 w/ improvement in cbc  - GI recs appreciated   - NPO after midnight for EGD/colonoscopy in am  - c/w PPI  - Go Lytely Prep 4L today   - per psych patient does not have capacity to make own decisions, patient's daughter who is the HCP consents for patient to have endoscopy  - monitor cbc and transfuse prn to maintain hgb > 7    Worsening dementia  - per staff pt at baseline mental status  - seen by psychiatry, lacks decision making capacity   - c/w Namenda and Seroquel  - MRI grossly unremarkable, although poor quality, no seizure activity on EEG  - Will need repeat MRI as outpatient given history of aneurysm (pt follows neuro as OP)    Seizure disorder  - c/w Lamictal     Cerebral aneurysm as per daughter  - c/w statin    Dispo: patient needs long term placement as daughter can no longer care for her. CM aware. Patient's daughter Ashley Neff also the health care proxy [phone]

## 2019-01-10 NOTE — PROGRESS NOTE ADULT - ASSESSMENT
67 yo female pmh of breast cancer, dementia, depression, seizures with    Problem1- Anemia and intermittent melenic stools. Currently, no active bleeding. She will agree to (EGD and Colonoscopy)  at present.   Rec  -Patient is now agreeable to endoscopy patient will have EGD/Colonoscopy Thursday  -Transfuse to hemoglobin>8  -NPO after midnight Thursday into Friday   -Continue PPI  -AM CBC, AM BMP, AM PT,PTT,INR Friday morning  -Go Lytely Prep 4L Thursday   -4 tablets Dulcolax  -Type and Screen   -As per psych patient does not have capacity to make own decisions, patient's daughter who is the health care proxy also consents for patient to have endoscopy. Patient's daughter Ashley Neff also the health care proxy [phone]  -Maintain hemodynamic stability 69 yo female pmh of breast cancer, dementia, depression, seizures with    Problem1- Anemia and intermittent melenic stools. Currently, no active bleeding. She will agree to (EGD and Colonoscopy)  at present.   Rec  -Patient is now agreeable to endoscopy patient will have EGD/Colonoscopy Friday  -Clear Liquids today  -Transfuse to hemoglobin>8  -NPO after midnight Thursday into Friday   -Continue PPI  -AM CBC, AM BMP  Friday morning  -Go Lytely Prep 4L Thursday   -4 tablets Dulcolax  -Type and Screen   -As per psych patient does not have capacity to make own decisions, patient's daughter who is the health care proxy also consents for patient to have endoscopy. Patient's daughter Ashley Neff also the health care proxy [phone]  -Maintain hemodynamic stability

## 2019-01-11 ENCOUNTER — TRANSCRIPTION ENCOUNTER (OUTPATIENT)
Age: 69
End: 2019-01-11

## 2019-01-11 ENCOUNTER — RESULT REVIEW (OUTPATIENT)
Age: 69
End: 2019-01-11

## 2019-01-11 LAB
ANION GAP SERPL CALC-SCNC: 13 MMOL/L — SIGNIFICANT CHANGE UP (ref 7–14)
BUN SERPL-MCNC: 9 MG/DL — LOW (ref 10–20)
CALCIUM SERPL-MCNC: 9 MG/DL — SIGNIFICANT CHANGE UP (ref 8.5–10.1)
CHLORIDE SERPL-SCNC: 104 MMOL/L — SIGNIFICANT CHANGE UP (ref 98–110)
CO2 SERPL-SCNC: 29 MMOL/L — SIGNIFICANT CHANGE UP (ref 17–32)
CREAT SERPL-MCNC: 0.8 MG/DL — SIGNIFICANT CHANGE UP (ref 0.7–1.5)
GLUCOSE SERPL-MCNC: 139 MG/DL — HIGH (ref 70–99)
HCT VFR BLD CALC: 29 % — LOW (ref 37–47)
HGB BLD-MCNC: 8.3 G/DL — LOW (ref 12–16)
MCHC RBC-ENTMCNC: 21.5 PG — LOW (ref 27–31)
MCHC RBC-ENTMCNC: 28.6 G/DL — LOW (ref 32–37)
MCV RBC AUTO: 75.1 FL — LOW (ref 81–99)
NRBC # BLD: 0 /100 WBCS — SIGNIFICANT CHANGE UP (ref 0–0)
PLATELET # BLD AUTO: 164 K/UL — SIGNIFICANT CHANGE UP (ref 130–400)
POTASSIUM SERPL-MCNC: 3.9 MMOL/L — SIGNIFICANT CHANGE UP (ref 3.5–5)
POTASSIUM SERPL-SCNC: 3.9 MMOL/L — SIGNIFICANT CHANGE UP (ref 3.5–5)
RBC # BLD: 3.86 M/UL — LOW (ref 4.2–5.4)
RBC # FLD: 20.7 % — HIGH (ref 11.5–14.5)
SODIUM SERPL-SCNC: 146 MMOL/L — SIGNIFICANT CHANGE UP (ref 135–146)
WBC # BLD: 5.62 K/UL — SIGNIFICANT CHANGE UP (ref 4.8–10.8)
WBC # FLD AUTO: 5.62 K/UL — SIGNIFICANT CHANGE UP (ref 4.8–10.8)

## 2019-01-11 RX ADMIN — LAMOTRIGINE 100 MILLIGRAM(S): 25 TABLET, ORALLY DISINTEGRATING ORAL at 05:25

## 2019-01-11 RX ADMIN — LAMOTRIGINE 100 MILLIGRAM(S): 25 TABLET, ORALLY DISINTEGRATING ORAL at 17:30

## 2019-01-11 RX ADMIN — SIMVASTATIN 20 MILLIGRAM(S): 20 TABLET, FILM COATED ORAL at 21:41

## 2019-01-11 RX ADMIN — QUETIAPINE FUMARATE 25 MILLIGRAM(S): 200 TABLET, FILM COATED ORAL at 21:41

## 2019-01-11 RX ADMIN — Medication 650 MILLIGRAM(S): at 05:26

## 2019-01-11 RX ADMIN — Medication 5 MILLIGRAM(S): at 21:41

## 2019-01-11 RX ADMIN — MEMANTINE HYDROCHLORIDE 10 MILLIGRAM(S): 10 TABLET ORAL at 05:25

## 2019-01-11 RX ADMIN — PANTOPRAZOLE SODIUM 40 MILLIGRAM(S): 20 TABLET, DELAYED RELEASE ORAL at 17:30

## 2019-01-11 RX ADMIN — PANTOPRAZOLE SODIUM 40 MILLIGRAM(S): 20 TABLET, DELAYED RELEASE ORAL at 05:25

## 2019-01-11 RX ADMIN — MEMANTINE HYDROCHLORIDE 10 MILLIGRAM(S): 10 TABLET ORAL at 17:30

## 2019-01-11 RX ADMIN — ESCITALOPRAM OXALATE 10 MILLIGRAM(S): 10 TABLET, FILM COATED ORAL at 17:30

## 2019-01-11 NOTE — PRE-ANESTHESIA EVALUATION ADULT - NSANTHOSAYNRD_GEN_A_CORE
No. BHARGAVI screening performed.  STOP BANG Legend: 0-2 = LOW Risk; 3-4 = INTERMEDIATE Risk; 5-8 = HIGH Risk

## 2019-01-11 NOTE — PROGRESS NOTE ADULT - ASSESSMENT
67 yo F with PMHx of breast cancer, dementia, depression, seizures, h/o CVA x2 and Cerebral Aneurysm as per daughter presented with LOC/Fall. Found to be anemic, requiring PRBC transfusions. Hgb now stable    Anemia possibly 2/2 GIB (hx of melanotic stools)  - received 1 unit PRBC 1/9 w/ improvement in cbc  - GI recs appreciated   - s/p colonscopy/EGD today, f/u GI recs and results  - c/w PPI  - monitor cbc and transfuse prn to maintain hgb > 7    Worsening dementia  - per staff pt at baseline mental status  - seen by psychiatry, lacks decision making capacity   - c/w Namenda and Seroquel  - MRI grossly unremarkable, although poor quality, no seizure activity on EEG  - Will need repeat MRI as outpatient given history of aneurysm (pt follows neuro as OP)    Seizure disorder  - c/w Lamictal     Cerebral aneurysm as per daughter  - c/w statin    Dispo: patient needs long term placement as daughter can no longer care for her. CM aware. Patient's daughter Ashley Neff also the health care proxy [phone] 67 yo F with PMHx of breast cancer, dementia, depression, seizures, h/o CVA x2 and Cerebral Aneurysm as per daughter presented with LOC/Fall. Found to be anemic, requiring PRBC transfusions. Hgb now stable    Anemia possibly 2/2 GIB (hx of melanotic stools)  - received 1 unit PRBC 1/9 w/ improvement in cbc  - GI recs appreciated   - s/p colonscopy/EGD today, f/u GI recs and results  - c/w PPI  - monitor cbc and transfuse prn to maintain hgb > 7    Worsening dementia  - per staff pt at baseline mental status  - seen by psychiatry, lacks decision making capacity   - c/w Namenda and Seroquel  - MRI grossly unremarkable, although poor quality, no seizure activity on EEG  - Will need repeat MRI as outpatient given history of aneurysm (pt follows neuro as OP)    Prediabetes  - a1c 6.3  - glucose < 180 on bmps, will monitor    Seizure disorder  - c/w Lamictal     Cerebral aneurysm as per daughter  - c/w statin    Dispo: patient needs long term placement as daughter can no longer care for her. CM aware. Patient's daughter Ashley Neff also the health care proxy [phone]

## 2019-01-11 NOTE — PROGRESS NOTE ADULT - SUBJECTIVE AND OBJECTIVE BOX
KDSONYA HUBER  68y, Female  Allergy: No Known Allergies    Hospital Day: 10d    Patient seen and examined earlier today. Patient denies any abdominal pain, N/V/D, fevers or chills.    PMH/PSH:  PAST MEDICAL & SURGICAL HISTORY:  Brain aneurysm  Seizures  Depression, unspecified depression type  Hyperlipidemia, unspecified hyperlipidemia type  Dementia  Breast cancer in female  H/O abdominal hysterectomy  H/O mastectomy, left        VITALS:  T(F): 97.3 (01-11-19 @ 13:52), Max: 99.2 (01-10-19 @ 22:04)  HR: 58 (01-11-19 @ 13:52)  BP: 155/67 (01-11-19 @ 13:52) (124/72 - 162/76)  RR: 16 (01-11-19 @ 13:52)  SpO2: 98% (01-11-19 @ 11:53)    TESTS & MEASUREMENTS:  Weight (Kg): 90.7 (01-11-19 @ 11:53)  BMI (kg/m2): 32.3 (01-11)                          8.3    5.62  )-----------( 164      ( 11 Jan 2019 08:02 )             29.0     PT/INR - ( 10 [place]rell 2019 08:01 )   PT: 13.30 sec;   INR: 1.23 ratio         PTT - ( 10 Kentrell 2019 08:01 )  PTT:28.7 sec  01-11    146  |  104  |  9<L>  ----------------------------<  139<H>  3.9   |  29  |  0.8    Ca    9.0      11 Jan 2019 08:02        RECENT DIAGNOSTIC ORDERS:  Diet, Regular:   High Fiber  DASH/TLC Sodium & Cholesterol Restricted (01-11-19 @ 13:50)  Surgical Pathology Report: 12:48 (01-11-19 @ 14:00)  Surgical Pathology Report: 12:21 (01-11-19 @ 14:04)      MEDICATIONS:  MEDICATIONS  (STANDING):  bisacodyl 5 milliGRAM(s) Oral at bedtime  bisacodyl 5 milliGRAM(s) Oral at bedtime  escitalopram 10 milliGRAM(s) Oral daily  lamoTRIgine 100 milliGRAM(s) Oral two times a day  memantine 10 milliGRAM(s) Oral two times a day  pantoprazole    Tablet 40 milliGRAM(s) Oral two times a day  QUEtiapine 25 milliGRAM(s) Oral at bedtime  simvastatin 20 milliGRAM(s) Oral at bedtime    MEDICATIONS  (PRN):  acetaminophen   Tablet .. 650 milliGRAM(s) Oral every 6 hours PRN Temp greater or equal to 38C (100.4F), Moderate Pain (4 - 6)      HOME MEDICATIONS:  escitalopram 10 mg oral tablet (01-01)  lamoTRIgine 100 mg oral tablet (01-01)  memantine 10 mg oral tablet (01-01)  QUEtiapine 25 mg oral tablet (01-01)  simvastatin 20 mg oral tablet (01-01)      PHYSICAL EXAM:  GENERAL: elderly obese female, NAD, well-developed  HEAD:  Atraumatic, Normocephalic  CHEST/LUNG: Clear to auscultation bilaterally  HEART: Regular rate and rhythm  ABDOMEN: Soft, Nontender, Nondistended; Bowel sounds present  EXTREMITIES:  2+ Peripheral Pulses, No clubbing, cyanosis, or edema  PSYCH: AAOx2

## 2019-01-12 LAB
ANION GAP SERPL CALC-SCNC: 12 MMOL/L — SIGNIFICANT CHANGE UP (ref 7–14)
BUN SERPL-MCNC: 9 MG/DL — LOW (ref 10–20)
CALCIUM SERPL-MCNC: 9.4 MG/DL — SIGNIFICANT CHANGE UP (ref 8.5–10.1)
CHLORIDE SERPL-SCNC: 105 MMOL/L — SIGNIFICANT CHANGE UP (ref 98–110)
CO2 SERPL-SCNC: 29 MMOL/L — SIGNIFICANT CHANGE UP (ref 17–32)
CREAT SERPL-MCNC: 0.8 MG/DL — SIGNIFICANT CHANGE UP (ref 0.7–1.5)
GLUCOSE SERPL-MCNC: 158 MG/DL — HIGH (ref 70–99)
HCT VFR BLD CALC: 30 % — LOW (ref 37–47)
HGB BLD-MCNC: 8.7 G/DL — LOW (ref 12–16)
MCHC RBC-ENTMCNC: 21.9 PG — LOW (ref 27–31)
MCHC RBC-ENTMCNC: 29 G/DL — LOW (ref 32–37)
MCV RBC AUTO: 75.4 FL — LOW (ref 81–99)
NRBC # BLD: 0 /100 WBCS — SIGNIFICANT CHANGE UP (ref 0–0)
PLATELET # BLD AUTO: 201 K/UL — SIGNIFICANT CHANGE UP (ref 130–400)
POTASSIUM SERPL-MCNC: 4.2 MMOL/L — SIGNIFICANT CHANGE UP (ref 3.5–5)
POTASSIUM SERPL-SCNC: 4.2 MMOL/L — SIGNIFICANT CHANGE UP (ref 3.5–5)
RBC # BLD: 3.98 M/UL — LOW (ref 4.2–5.4)
RBC # FLD: 20.5 % — HIGH (ref 11.5–14.5)
SODIUM SERPL-SCNC: 146 MMOL/L — SIGNIFICANT CHANGE UP (ref 135–146)
WBC # BLD: 7.42 K/UL — SIGNIFICANT CHANGE UP (ref 4.8–10.8)
WBC # FLD AUTO: 7.42 K/UL — SIGNIFICANT CHANGE UP (ref 4.8–10.8)

## 2019-01-12 RX ADMIN — SIMVASTATIN 20 MILLIGRAM(S): 20 TABLET, FILM COATED ORAL at 21:22

## 2019-01-12 RX ADMIN — LAMOTRIGINE 100 MILLIGRAM(S): 25 TABLET, ORALLY DISINTEGRATING ORAL at 17:43

## 2019-01-12 RX ADMIN — MEMANTINE HYDROCHLORIDE 10 MILLIGRAM(S): 10 TABLET ORAL at 06:27

## 2019-01-12 RX ADMIN — PANTOPRAZOLE SODIUM 40 MILLIGRAM(S): 20 TABLET, DELAYED RELEASE ORAL at 06:28

## 2019-01-12 RX ADMIN — QUETIAPINE FUMARATE 25 MILLIGRAM(S): 200 TABLET, FILM COATED ORAL at 21:22

## 2019-01-12 RX ADMIN — MEMANTINE HYDROCHLORIDE 10 MILLIGRAM(S): 10 TABLET ORAL at 17:43

## 2019-01-12 RX ADMIN — LAMOTRIGINE 100 MILLIGRAM(S): 25 TABLET, ORALLY DISINTEGRATING ORAL at 06:28

## 2019-01-12 RX ADMIN — ESCITALOPRAM OXALATE 10 MILLIGRAM(S): 10 TABLET, FILM COATED ORAL at 12:25

## 2019-01-12 RX ADMIN — PANTOPRAZOLE SODIUM 40 MILLIGRAM(S): 20 TABLET, DELAYED RELEASE ORAL at 17:43

## 2019-01-12 NOTE — PROGRESS NOTE ADULT - SUBJECTIVE AND OBJECTIVE BOX
SONYA YI  68y, Female  Allergy: No Known Allergies    Hospital Day: 11d    Patient seen and examined earlier today. No acute overnight events. S/p colonoscopy yesterday. Denies abdominal pain, N/V/D, fevers or chills.    PMH/PSH:  PAST MEDICAL & SURGICAL HISTORY:  Brain aneurysm  Seizures  Depression, unspecified depression type  Hyperlipidemia, unspecified hyperlipidemia type  Dementia  Breast cancer in female  H/O abdominal hysterectomy  H/O mastectomy, left        VITALS:  T(F): 97.6 (01-12-19 @ 06:45), Max: 98.1 (01-11-19 @ 21:49)  HR: 58 (01-12-19 @ 06:45)  BP: 106/55 (01-12-19 @ 06:45) (100/55 - 162/76)  RR: 18 (01-12-19 @ 06:45)  SpO2: 98% (01-11-19 @ 11:53)    TESTS & MEASUREMENTS:  Weight (Kg): 90.7 (01-11-19 @ 11:53)  BMI (kg/m2): 32.3 (01-11)                          8.7    7.42  )-----------( 201      ( 12 Jan 2019 08:02 )             30.0       01-12    146  |  105  |  9<L>  ----------------------------<  158<H>  4.2   |  29  |  0.8    Ca    9.4      12 Jan 2019 08:02        RECENT DIAGNOSTIC ORDERS:  Diet, Regular:   High Fiber  DASH/TLC Sodium & Cholesterol Restricted (01-11-19 @ 13:50)  Surgical Pathology Report: 12:48 (01-11-19 @ 14:00)  Surgical Pathology Report: 12:21 (01-11-19 @ 14:04)      MEDICATIONS:  MEDICATIONS  (STANDING):  bisacodyl 5 milliGRAM(s) Oral at bedtime  bisacodyl 5 milliGRAM(s) Oral at bedtime  escitalopram 10 milliGRAM(s) Oral daily  lamoTRIgine 100 milliGRAM(s) Oral two times a day  memantine 10 milliGRAM(s) Oral two times a day  pantoprazole    Tablet 40 milliGRAM(s) Oral two times a day  QUEtiapine 25 milliGRAM(s) Oral at bedtime  simvastatin 20 milliGRAM(s) Oral at bedtime    MEDICATIONS  (PRN):  acetaminophen   Tablet .. 650 milliGRAM(s) Oral every 6 hours PRN Temp greater or equal to 38C (100.4F), Moderate Pain (4 - 6)      HOME MEDICATIONS:  escitalopram 10 mg oral tablet (01-01)  lamoTRIgine 100 mg oral tablet (01-01)  memantine 10 mg oral tablet (01-01)  QUEtiapine 25 mg oral tablet (01-01)  simvastatin 20 mg oral tablet (01-01)      PHYSICAL EXAM:  GENERAL: elderly female, NAD, well-developed  HEAD:  Atraumatic, Normocephalic  EYES: EOMI, PERRLA  CHEST/LUNG: Clear to auscultation bilaterally; No wheeze  HEART: Regular rate and rhythm; No murmurs, rubs, or gallops  ABDOMEN: Soft, Nontender, Nondistended; Bowel sounds present  EXTREMITIES:  2+ Peripheral Pulses, No clubbing, cyanosis, or edema  PSYCH: AAOx1

## 2019-01-12 NOTE — PROGRESS NOTE ADULT - ASSESSMENT
69 yo F with PMHx of breast cancer, dementia, depression, seizures, h/o CVA x2 and Cerebral Aneurysm as per daughter presented with LOC/Fall. Found to be anemic, requiring PRBC transfusions. Hgb now stable.     Anemia possibly multifactorial- GIB (hx of melanotic stools), AOCD  - received 1 unit PRBC 1/9 w/ improvement in cbc, hgb now stable  - GI recs appreciated   - s/p colonscopy 1/11 which showed moderate diverticulosis of sigmoid colon and polyps  - c/w PPI  - monitor cbc and transfuse prn to maintain hgb > 7  - per GI findings of diverticulosis or 3 moderately sized polyps could potentially explain enemia. If pt develops over bleeding or further drop in cbc, will consider repeat colonoscopy vs video capsule endoscopy   - per GI follow-up in office 2-3wks, high-fiber diet    Worsening dementia  - per staff pt at baseline mental status  - seen by psychiatry, lacks decision making capacity   - c/w Namenda and Seroquel  - MRI grossly unremarkable, although poor quality, no seizure activity on EEG  - Will need repeat MRI as outpatient given history of aneurysm (pt follows neuro as OP)    Prediabetes  - a1c 6.3  - glucose < 180 on bmps, will monitor    Seizure disorder  - c/w Lamictal     Cerebral aneurysm as per daughter  - c/w statin    Dispo: D/c planning. Patient needs long term placement as daughter can no longer care for her. CM aware. Patient's daughter Ashley Neff also the health care proxy [phone]

## 2019-01-13 LAB
HCT VFR BLD CALC: 27.4 % — LOW (ref 37–47)
HGB BLD-MCNC: 7.8 G/DL — LOW (ref 12–16)
MCHC RBC-ENTMCNC: 21.6 PG — LOW (ref 27–31)
MCHC RBC-ENTMCNC: 28.5 G/DL — LOW (ref 32–37)
MCV RBC AUTO: 75.9 FL — LOW (ref 81–99)
NRBC # BLD: 0 /100 WBCS — SIGNIFICANT CHANGE UP (ref 0–0)
PLATELET # BLD AUTO: 148 K/UL — SIGNIFICANT CHANGE UP (ref 130–400)
RBC # BLD: 3.61 M/UL — LOW (ref 4.2–5.4)
RBC # FLD: 20.6 % — HIGH (ref 11.5–14.5)
WBC # BLD: 6.75 K/UL — SIGNIFICANT CHANGE UP (ref 4.8–10.8)
WBC # FLD AUTO: 6.75 K/UL — SIGNIFICANT CHANGE UP (ref 4.8–10.8)

## 2019-01-13 RX ADMIN — LAMOTRIGINE 100 MILLIGRAM(S): 25 TABLET, ORALLY DISINTEGRATING ORAL at 06:13

## 2019-01-13 RX ADMIN — PANTOPRAZOLE SODIUM 40 MILLIGRAM(S): 20 TABLET, DELAYED RELEASE ORAL at 17:40

## 2019-01-13 RX ADMIN — ESCITALOPRAM OXALATE 10 MILLIGRAM(S): 10 TABLET, FILM COATED ORAL at 11:49

## 2019-01-13 RX ADMIN — MEMANTINE HYDROCHLORIDE 10 MILLIGRAM(S): 10 TABLET ORAL at 17:40

## 2019-01-13 RX ADMIN — LAMOTRIGINE 100 MILLIGRAM(S): 25 TABLET, ORALLY DISINTEGRATING ORAL at 17:40

## 2019-01-13 RX ADMIN — PANTOPRAZOLE SODIUM 40 MILLIGRAM(S): 20 TABLET, DELAYED RELEASE ORAL at 06:13

## 2019-01-13 RX ADMIN — SIMVASTATIN 20 MILLIGRAM(S): 20 TABLET, FILM COATED ORAL at 21:38

## 2019-01-13 RX ADMIN — QUETIAPINE FUMARATE 25 MILLIGRAM(S): 200 TABLET, FILM COATED ORAL at 21:38

## 2019-01-13 RX ADMIN — MEMANTINE HYDROCHLORIDE 10 MILLIGRAM(S): 10 TABLET ORAL at 06:13

## 2019-01-13 NOTE — PROGRESS NOTE ADULT - SUBJECTIVE AND OBJECTIVE BOX
KDSONYA  68y, Female  Allergy: No Known Allergies    Hospital Day: 12d    Patient seen and examined earlier today. Denies abdominal pain, N/V/D, hematochezia or hematemesis.     PMH/PSH:  PAST MEDICAL & SURGICAL HISTORY:  Brain aneurysm  Seizures  Depression, unspecified depression type  Hyperlipidemia, unspecified hyperlipidemia type  Dementia  Breast cancer in female  H/O abdominal hysterectomy  H/O mastectomy, left        VITALS:  T(F): 97.9 (01-13-19 @ 06:18), Max: 98.8 (01-12-19 @ 22:07)  HR: 65 (01-13-19 @ 06:18)  BP: 130/65 (01-13-19 @ 06:18) (115/58 - 130/68)  RR: 19 (01-13-19 @ 06:18)  SpO2: --    TESTS & MEASUREMENTS:  Weight (Kg): 90.7 (01-11-19 @ 11:53)  BMI (kg/m2): 32.3 (01-11)                          7.8    6.75  )-----------( 148      ( 13 Jan 2019 07:39 )             27.4       01-12    146  |  105  |  9<L>  ----------------------------<  158<H>  4.2   |  29  |  0.8    Ca    9.4      12 Jan 2019 08:02    MEDICATIONS:  MEDICATIONS  (STANDING):  escitalopram 10 milliGRAM(s) Oral daily  lamoTRIgine 100 milliGRAM(s) Oral two times a day  memantine 10 milliGRAM(s) Oral two times a day  pantoprazole    Tablet 40 milliGRAM(s) Oral two times a day  QUEtiapine 25 milliGRAM(s) Oral at bedtime  simvastatin 20 milliGRAM(s) Oral at bedtime    MEDICATIONS  (PRN):  acetaminophen   Tablet .. 650 milliGRAM(s) Oral every 6 hours PRN Temp greater or equal to 38C (100.4F), Moderate Pain (4 - 6)      HOME MEDICATIONS:  escitalopram 10 mg oral tablet (01-01)  lamoTRIgine 100 mg oral tablet (01-01)  memantine 10 mg oral tablet (01-01)  QUEtiapine 25 mg oral tablet (01-01)  simvastatin 20 mg oral tablet (01-01)      PHYSICAL EXAM:  GENERAL: elderly obese female, in NAD, well-developed  HEAD:  Atraumatic, Normocephalic  CHEST/LUNG: Clear to auscultation bilaterally; No wheeze  HEART: Regular rate and rhythm; No murmurs, rubs, or gallops  ABDOMEN: Soft, Nontender, Nondistended; Bowel sounds present  EXTREMITIES:  2+ Peripheral Pulses, No clubbing, cyanosis, or edema  PSYCH: AAOx2

## 2019-01-13 NOTE — DOWNTIME INTERRUPTION NOTE - WHICH MANUAL FORMS INITIATED?
Pt. resting comfortably, no signs of distress or complaints of pain at this time, no changes. Will continue to monitor.

## 2019-01-13 NOTE — PROGRESS NOTE ADULT - ASSESSMENT
69 yo F with PMHx of breast cancer, dementia, depression, seizures, h/o CVA x2 and Cerebral Aneurysm as per daughter presented with LOC/Fall. Found to be anemic, requiring PRBC transfusions.     Anemia possibly 2/2 GIB (hx of melanotic stools)  - received 1 unit PRBC 1/9 w/ improvement in cbc, hgb slightly downtrending today  - GI recs appreciated   - s/p colonscopy 1/11 which showed moderate diverticulosis of sigmoid colon and polyps  - c/w PPI  - monitor cbc and transfuse prn to maintain hgb > 7  - per GI findings of diverticulosis or 3 moderately sized polyps could potentially explain enemia. If pt develops over bleeding or further drop in cbc, will consider repeat colonoscopy vs video capsule endoscopy   - per GI follow-up in office 2-3wks, high-fiber diet  - ferritin, vit 12 and folate wnl  - check iron studies    Worsening dementia  - per staff pt at baseline mental status  - seen by psychiatry, lacks decision making capacity   - c/w Namenda and Seroquel  - MRI grossly unremarkable, although poor quality, no seizure activity on EEG  - Will need repeat MRI as outpatient given history of aneurysm (pt follows neuro as OP)    Prediabetes  - a1c 6.3  - glucose < 180 on bmps, will monitor    Seizure disorder  - c/w Lamictal     Cerebral aneurysm as per daughter  - c/w statin    Dispo: D/c planning. Patient needs long term placement as daughter can no longer care for her. CM aware. Patient's daughter Ashley Neff also the health care proxy [phone]

## 2019-01-14 ENCOUNTER — TRANSCRIPTION ENCOUNTER (OUTPATIENT)
Age: 69
End: 2019-01-14

## 2019-01-14 LAB
GLUCOSE BLDC GLUCOMTR-MCNC: 155 MG/DL — HIGH (ref 70–99)
HCT VFR BLD CALC: 27.3 % — LOW (ref 37–47)
HGB BLD-MCNC: 7.7 G/DL — LOW (ref 12–16)
MCHC RBC-ENTMCNC: 21.4 PG — LOW (ref 27–31)
MCHC RBC-ENTMCNC: 28.2 G/DL — LOW (ref 32–37)
MCV RBC AUTO: 76 FL — LOW (ref 81–99)
NRBC # BLD: 0 /100 WBCS — SIGNIFICANT CHANGE UP (ref 0–0)
PLATELET # BLD AUTO: 156 K/UL — SIGNIFICANT CHANGE UP (ref 130–400)
RBC # BLD: 3.59 M/UL — LOW (ref 4.2–5.4)
RBC # FLD: 20.2 % — HIGH (ref 11.5–14.5)
SURGICAL PATHOLOGY STUDY: SIGNIFICANT CHANGE UP
SURGICAL PATHOLOGY STUDY: SIGNIFICANT CHANGE UP
WBC # BLD: 6.55 K/UL — SIGNIFICANT CHANGE UP (ref 4.8–10.8)
WBC # FLD AUTO: 6.55 K/UL — SIGNIFICANT CHANGE UP (ref 4.8–10.8)

## 2019-01-14 RX ADMIN — ESCITALOPRAM OXALATE 10 MILLIGRAM(S): 10 TABLET, FILM COATED ORAL at 11:13

## 2019-01-14 RX ADMIN — PANTOPRAZOLE SODIUM 40 MILLIGRAM(S): 20 TABLET, DELAYED RELEASE ORAL at 17:22

## 2019-01-14 RX ADMIN — MEMANTINE HYDROCHLORIDE 10 MILLIGRAM(S): 10 TABLET ORAL at 05:12

## 2019-01-14 RX ADMIN — MEMANTINE HYDROCHLORIDE 10 MILLIGRAM(S): 10 TABLET ORAL at 17:22

## 2019-01-14 RX ADMIN — LAMOTRIGINE 100 MILLIGRAM(S): 25 TABLET, ORALLY DISINTEGRATING ORAL at 05:12

## 2019-01-14 RX ADMIN — PANTOPRAZOLE SODIUM 40 MILLIGRAM(S): 20 TABLET, DELAYED RELEASE ORAL at 05:12

## 2019-01-14 RX ADMIN — LAMOTRIGINE 100 MILLIGRAM(S): 25 TABLET, ORALLY DISINTEGRATING ORAL at 17:22

## 2019-01-14 RX ADMIN — QUETIAPINE FUMARATE 25 MILLIGRAM(S): 200 TABLET, FILM COATED ORAL at 21:51

## 2019-01-14 RX ADMIN — SIMVASTATIN 20 MILLIGRAM(S): 20 TABLET, FILM COATED ORAL at 21:51

## 2019-01-14 NOTE — DISCHARGE NOTE ADULT - FINDINGS/TREATMENT
< from: EGD (01.11.19 @ 11:00) >     GE junction nodularity (Biopsy).    Erythema in the antrum, stomach body and fundus compatible with non-erosive  gastritis. (Biopsy).    Erythema in the first part of the duodenum and second part of the duodenum  compatible with duodenitis. (Biopsy).     Plan: Await pathology results  GERD/Ulcer Diet  Follow-up office visit in 2-3 weeks  PPI QD    < end of copied text > < from: Colonoscopy (01.11.19 @ 11:30) >    Moderate diverticulosis of the sigmoid colon.    Polyp (1 cm) in the ascending colon. (Polypectomy).    Polyp (5 mm) in the cecum. (Polypectomy).   Polyp (12 mm) in the colon. (Polypectomy).    Terminal ileum could not be intubated.     Plan: Await pathology results  Follow-up office visit in 2-3 weeks  High Fiber Diet   The findings of diverticulosis or 3 moderately sized polyps could potentially  explain anemia. Monitor CBC. If hgb drops and/or patient develops further overt  bleeding, will consider repeat colonoscopy vs. video capsule endoscopy    < end of copied text >

## 2019-01-14 NOTE — DISCHARGE NOTE ADULT - HOSPITAL COURSE
69 yo F with PMHx of breast cancer, dementia, depression, seizures, h/o CVA x2 and Cerebral Aneurysm as per daughter presented with LOC/Fall. Found to be anemic, requiring PRBC transfusions. Anemia possibly 2/2 GIB (hx of melanotic stools). Received 1 unit PRBC 1/9 w/ improvement in cbc, hgb then remained stable. S/p colonscopy 1/11 which showed moderate diverticulosis of sigmoid colon and polypsper GI findings of diverticulosis or 3 moderately sized polyps could potentially explain enema. Ferritin, vit 12 and folate wnl. If pt develops over bleeding or further drop in cbc, will consider repeat colonoscopy vs video capsule endoscopy. GI follow-up in office 2-3wks, high-fiber diet. Will also need close PCP f/u to monitor her hgb. Ferritin, vit 12 and folate wnl. Worsening dementia resolved, pt at baseline mental status now. Seen by psychiatry, lacks decision making capacity. MRI grossly unremarkable, although poor quality, no seizure activity on EEG. Will need repeat MRI as outpatient given history of aneurysm (pt follows neuro as OP). Also noted to have pre-diabetes, will need outpt f/u with PCP. Patient is a 67 yo Female with PMHx of breast cancer, Dementia, depression, seizures, h/o CVA x 2 and Cerebral Aneurysm as per daughter presented following an episode of LOC/Fall. Patient lives alone and was found on the bathroom floor by daughter. As per daughter, she had seen her mom on camera, on the floor yelling that she felt dizzy. Patient mentioned feeling lethargic and dizzy but denied any nausea, vomiting, Diarrhea, fever, chills, CP, or SOB. Daughter had noted that she seemed more confused and had some slurred speech when she found her. Patient was found to be anemic with 5.9 Baseline 14 g/dl.  Daughter has limited information on pts hx because she has only started speaking to her in the last 2 months, but had been estranged for 7 years prior. Pt lived with her  who abandoned her in august of this year as per pts daughter.       Assessment and Plan:    1. Iron def Anemia:  % Saturation, Iron: 6 % (01.14.19 @ 07:18)  possibly 2/2 GIB (hx of melanotic stools)  s/p PRBC. No active bleeding noted. Hgb/hct stable.  GI consulted: s/p EGD & colonscopy 1/11 which showed moderate diverticulosis of sigmoid colon and polyps which could potentially explain Anemia and Gastritis/Duodenitis. If patient develops overt bleeding or further drop in cbc, will consider repeat colonoscopy vs video capsule endoscopy.   GI follow-up in office 2-3wks, high-fiber diet.  Continued PPI & PO iron. s/p Venofer x 2  Monitor cbc and transfuse prn to maintain hgb > 7 g/dl.  Follow up with repeat iron study in 4-6 weeks.      2. Advanced Dementia:  Patient at baseline.  Psychiatry consulted: Patient lacks decision making capacity.  Continued Namenda and Seroquel.  MRI grossly unremarkable, although poor quality, no seizure activity on EEG  Will need repeat MRI as outpatient given history of aneurysm and follow up with neurology outpatient.      3. Prediabetes:  a1c 6.3 %.  Monitored FS.      4. Seizure disorder:  Continued Lamictal.      5. h/o Cerebral aneurysm:  Out patient follow up with neurologist.

## 2019-01-14 NOTE — PROGRESS NOTE ADULT - SUBJECTIVE AND OBJECTIVE BOX
SONYA YI  68y, Female  Allergy: No Known Allergies    Hospital Day: 13d    Patient seen and examined earlier today. Patient denies abdominal pain, rectal bleeding, N/V.    PMH/PSH:  PAST MEDICAL & SURGICAL HISTORY:  Brain aneurysm  Seizures  Depression, unspecified depression type  Hyperlipidemia, unspecified hyperlipidemia type  Dementia  Breast cancer in female  H/O abdominal hysterectomy  H/O mastectomy, left        VITALS:  T(F): 98.7 (01-14-19 @ 14:00), Max: 98.7 (01-13-19 @ 22:04)  HR: 70 (01-14-19 @ 14:00)  BP: 127/59 (01-14-19 @ 14:00) (116/56 - 140/71)  RR: 16 (01-14-19 @ 14:00)  SpO2: --    TESTS & MEASUREMENTS:  Weight (Kg):   BMI (kg/m2): 32.3 (01-11)                          7.7    6.55  )-----------( 156      ( 14 Jan 2019 07:17 )             27.3     MEDICATIONS:  MEDICATIONS  (STANDING):  escitalopram 10 milliGRAM(s) Oral daily  lamoTRIgine 100 milliGRAM(s) Oral two times a day  memantine 10 milliGRAM(s) Oral two times a day  pantoprazole    Tablet 40 milliGRAM(s) Oral two times a day  QUEtiapine 25 milliGRAM(s) Oral at bedtime  simvastatin 20 milliGRAM(s) Oral at bedtime    MEDICATIONS  (PRN):  acetaminophen   Tablet .. 650 milliGRAM(s) Oral every 6 hours PRN Temp greater or equal to 38C (100.4F), Moderate Pain (4 - 6)      HOME MEDICATIONS:  escitalopram 10 mg oral tablet (01-01)  lamoTRIgine 100 mg oral tablet (01-01)  memantine 10 mg oral tablet (01-01)  QUEtiapine 25 mg oral tablet (01-01)  simvastatin 20 mg oral tablet (01-01)      PHYSICAL EXAM:  GENERAL: elderly pleasant female, in NAD, well-developed  HEAD:  Atraumatic, Normocephalic  CHEST/LUNG: Clear to auscultation bilaterally  HEART: Regular rate and rhythm; +s1 and s2  ABDOMEN: Soft, Nontender, Nondistended; Bowel sounds present  EXTREMITIES:  2+ Peripheral Pulses, No clubbing, cyanosis, or edema  PSYCH: AAOx1  NEUROLOGY: non-focal

## 2019-01-14 NOTE — DISCHARGE NOTE ADULT - CARE PROVIDER_API CALL
Gen Meadows), Gastroenterology; Internal Medicine  [address] [place] [id number]  Phone: [phone]  Fax: [phone]    Francisco Javier Marquez), Medicine  [address] [place] [address]  Phone: [phone]  Fax: [phone]

## 2019-01-14 NOTE — DISCHARGE NOTE ADULT - PATIENT PORTAL LINK FT
You can access the [facility] Patient Portal, offered by [facility], by registering with the following website: [email or web address]

## 2019-01-14 NOTE — DISCHARGE NOTE ADULT - PLAN OF CARE
evaluated and treat Anemia possibly 2/2 GIB (hx of melanotic stools). Received 1 unit PRBC 1/9 w/ improvement in cbc, hgb then remained stable. S/p colonscopy 1/11 which showed moderate diverticulosis of sigmoid colon and polypsper GI findings of diverticulosis or 3 moderately sized polyps could potentially explain enemia. If pt develops over bleeding or further drop in cbc, will consider repeat colonoscopy vs video capsule endoscopy. GI follow-up in office 2-3wks, high-fiber diet. Will also need close PCP f/u to monitor her hgb. Normal hemoglobin level Anemia possibly 2/2 GIB (history of melanotic stools).   Received 1 unit PRBC 1/9 with improvement in cbc.  Hemoglobin stable. s/p colonoscopy 1/11 which showed moderate diverticulosis of sigmoid colon and polyps.  EGD showed Gastritis and Duodenitis. GI follow-up in office 2-3wks, high-fiber diet. Will also need close PCP f/u to monitor her hgb.  Patient received Intravenous Iron. She will continue with oral supplementation. Supportive care Continue Current medications. Follow up. Follow up with Neurologist for MRI brain and monitoring of Aneurysm. Anemia due to GIB (history of melanotic stools).   Received 1 unit PRBC 1/9 with improvement in cbc.  Hemoglobin stable. s/p colonoscopy 1/11 which showed moderate diverticulosis of sigmoid colon and polyps.  EGD showed Gastritis and Duodenitis. GI follow-up in office 2-3wks, high-fiber diet. Will also need close PCP f/u to monitor her hgb.  Patient received Intravenous Iron. She will continue with oral supplementation.

## 2019-01-14 NOTE — DISCHARGE NOTE ADULT - MEDICATION SUMMARY - MEDICATIONS TO TAKE
I will START or STAY ON the medications listed below when I get home from the hospital:    acetaminophen 325 mg oral tablet  -- 2 tab(s) by mouth every 6 hours, As needed, Temp greater or equal to 38C (100.4F), Moderate Pain (4 - 6)  -- Indication: For Pain/fever    lamoTRIgine 100 mg oral tablet  -- 1 tab(s) by mouth 2 times a day  -- Indication: For Seizures    escitalopram 10 mg oral tablet  -- 1 tab(s) by mouth once a day  -- Indication: For Depression, unspecified depression type    simvastatin 20 mg oral tablet  -- 1 tab(s) by mouth once a day (at bedtime)  -- Indication: For Hyperlipidemia, unspecified hyperlipidemia type    QUEtiapine 25 mg oral tablet  -- once a day at night.  -- Indication: For Dementia    ferrous sulfate 325 mg (65 mg elemental iron) oral tablet  -- 1 tab(s) by mouth every other day   -- Indication: For Anemia    docusate sodium 100 mg oral capsule  -- 1 cap(s) by mouth 2 times a day  -- Indication: For Bowel care    senna oral tablet  -- 2 tab(s) by mouth once a day (at bedtime), As Needed  -- Indication: For Bowel care    memantine 10 mg oral tablet  -- 1 tab(s) by mouth 2 times a day  -- Indication: For Dementia    pantoprazole 40 mg oral delayed release tablet  -- 1 tab(s) by mouth 2 times a day  -- Indication: For gastritis

## 2019-01-14 NOTE — DISCHARGE NOTE ADULT - CARE PLAN
Principal Discharge DX:	Anemia  Goal:	evaluated and treat  Assessment and plan of treatment:	Anemia possibly 2/2 GIB (hx of melanotic stools). Received 1 unit PRBC 1/9 w/ improvement in cbc, hgb then remained stable. S/p colonscopy 1/11 which showed moderate diverticulosis of sigmoid colon and polypsper GI findings of diverticulosis or 3 moderately sized polyps could potentially explain enemia. If pt develops over bleeding or further drop in cbc, will consider repeat colonoscopy vs video capsule endoscopy. GI follow-up in office 2-3wks, high-fiber diet. Will also need close PCP f/u to monitor her hgb. Principal Discharge DX:	Anemia  Goal:	Normal hemoglobin level  Assessment and plan of treatment:	Anemia possibly 2/2 GIB (history of melanotic stools).   Received 1 unit PRBC 1/9 with improvement in cbc.  Hemoglobin stable. s/p colonoscopy 1/11 which showed moderate diverticulosis of sigmoid colon and polyps.  EGD showed Gastritis and Duodenitis. GI follow-up in office 2-3wks, high-fiber diet. Will also need close PCP f/u to monitor her hgb.  Patient received Intravenous Iron. She will continue with oral supplementation.  Secondary Diagnosis:	Dementia  Goal:	Supportive care  Assessment and plan of treatment:	Continue Current medications.  Secondary Diagnosis:	Brain aneurysm  Goal:	Follow up.  Assessment and plan of treatment:	Follow up with Neurologist for MRI brain and monitoring of Aneurysm. Principal Discharge DX:	Anemia  Goal:	Normal hemoglobin level  Assessment and plan of treatment:	Anemia due to GIB (history of melanotic stools).   Received 1 unit PRBC 1/9 with improvement in cbc.  Hemoglobin stable. s/p colonoscopy 1/11 which showed moderate diverticulosis of sigmoid colon and polyps.  EGD showed Gastritis and Duodenitis. GI follow-up in office 2-3wks, high-fiber diet. Will also need close PCP f/u to monitor her hgb.  Patient received Intravenous Iron. She will continue with oral supplementation.  Secondary Diagnosis:	Dementia  Goal:	Supportive care  Assessment and plan of treatment:	Continue Current medications.  Secondary Diagnosis:	Brain aneurysm  Goal:	Follow up.  Assessment and plan of treatment:	Follow up with Neurologist for MRI brain and monitoring of Aneurysm.

## 2019-01-14 NOTE — PROGRESS NOTE ADULT - ASSESSMENT
67 yo F with PMHx of breast cancer, dementia, depression, seizures, h/o CVA x2 and Cerebral Aneurysm as per daughter presented with LOC/Fall. Found to be anemic, requiring PRBC transfusions.     Anemia possibly 2/2 GIB (hx of melanotic stools)  - received 1 unit PRBC 1/9 w/ improvement in cbc, hgb slowly downtrending  - GI recs appreciated   - s/p colonscopy 1/11 which showed moderate diverticulosis of sigmoid colon and polyps  - c/w PPI  - monitor cbc and transfuse prn to maintain hgb > 7  - per GI findings of diverticulosis or 3 moderately sized polyps could potentially explain enemia. If pt develops over bleeding or further drop in cbc, will consider repeat colonoscopy vs video capsule endoscopy   - per GI follow-up in office 2-3wks, high-fiber diet  - ferritin, vit 12 and folate wnl  - iron studies pending    Worsening dementia  - per staff pt at baseline mental status  - seen by psychiatry, lacks decision making capacity   - c/w Namenda and Seroquel  - MRI grossly unremarkable, although poor quality, no seizure activity on EEG  - Will need repeat MRI as outpatient given history of aneurysm (pt follows neuro as OP)    Prediabetes  - a1c 6.3  - glucose < 180 on bmps, will monitor    Seizure disorder  - c/w Lamictal     Cerebral aneurysm as per daughter  - c/w statin    Dispo: D/c planning. Patient needs long term placement as daughter can no longer care for her. CM aware. Patient's daughter Ashley Neff also the health care proxy [phone]

## 2019-01-14 NOTE — DISCHARGE NOTE ADULT - ADDITIONAL INSTRUCTIONS
GI follow-up in office 2-3wks. Please eat a high-fiber diet. Follow-up with your PCP to monitor your hemoglobin closely. You were found to have pre-diabetes, please have your doctor monitor your hemoglobin a1c. You will need repeat MRI as outpatient given history of aneurysm please follow-up with your neurologist.

## 2019-01-15 LAB
HCT VFR BLD CALC: 26.8 % — LOW (ref 37–47)
HGB BLD-MCNC: 7.6 G/DL — LOW (ref 12–16)
MCHC RBC-ENTMCNC: 21.6 PG — LOW (ref 27–31)
MCHC RBC-ENTMCNC: 28.4 G/DL — LOW (ref 32–37)
MCV RBC AUTO: 76.1 FL — LOW (ref 81–99)
NRBC # BLD: 0 /100 WBCS — SIGNIFICANT CHANGE UP (ref 0–0)
PLATELET # BLD AUTO: 150 K/UL — SIGNIFICANT CHANGE UP (ref 130–400)
RBC # BLD: 3.52 M/UL — LOW (ref 4.2–5.4)
RBC # FLD: 20.3 % — HIGH (ref 11.5–14.5)
WBC # BLD: 6.58 K/UL — SIGNIFICANT CHANGE UP (ref 4.8–10.8)
WBC # FLD AUTO: 6.58 K/UL — SIGNIFICANT CHANGE UP (ref 4.8–10.8)

## 2019-01-15 RX ORDER — SENNA PLUS 8.6 MG/1
2 TABLET ORAL AT BEDTIME
Qty: 0 | Refills: 0 | Status: DISCONTINUED | OUTPATIENT
Start: 2019-01-15 | End: 2019-01-18

## 2019-01-15 RX ORDER — DOCUSATE SODIUM 100 MG
100 CAPSULE ORAL
Qty: 0 | Refills: 0 | Status: DISCONTINUED | OUTPATIENT
Start: 2019-01-15 | End: 2019-01-18

## 2019-01-15 RX ORDER — FERROUS SULFATE 325(65) MG
325 TABLET ORAL
Qty: 0 | Refills: 0 | Status: DISCONTINUED | OUTPATIENT
Start: 2019-01-15 | End: 2019-01-18

## 2019-01-15 RX ADMIN — LAMOTRIGINE 100 MILLIGRAM(S): 25 TABLET, ORALLY DISINTEGRATING ORAL at 18:03

## 2019-01-15 RX ADMIN — LAMOTRIGINE 100 MILLIGRAM(S): 25 TABLET, ORALLY DISINTEGRATING ORAL at 06:05

## 2019-01-15 RX ADMIN — QUETIAPINE FUMARATE 25 MILLIGRAM(S): 200 TABLET, FILM COATED ORAL at 21:10

## 2019-01-15 RX ADMIN — MEMANTINE HYDROCHLORIDE 10 MILLIGRAM(S): 10 TABLET ORAL at 06:06

## 2019-01-15 RX ADMIN — Medication 100 MILLIGRAM(S): at 18:02

## 2019-01-15 RX ADMIN — PANTOPRAZOLE SODIUM 40 MILLIGRAM(S): 20 TABLET, DELAYED RELEASE ORAL at 18:04

## 2019-01-15 RX ADMIN — MEMANTINE HYDROCHLORIDE 10 MILLIGRAM(S): 10 TABLET ORAL at 18:04

## 2019-01-15 RX ADMIN — ESCITALOPRAM OXALATE 10 MILLIGRAM(S): 10 TABLET, FILM COATED ORAL at 11:34

## 2019-01-15 RX ADMIN — SIMVASTATIN 20 MILLIGRAM(S): 20 TABLET, FILM COATED ORAL at 21:10

## 2019-01-15 RX ADMIN — Medication 325 MILLIGRAM(S): at 18:04

## 2019-01-15 RX ADMIN — SENNA PLUS 2 TABLET(S): 8.6 TABLET ORAL at 21:10

## 2019-01-15 RX ADMIN — PANTOPRAZOLE SODIUM 40 MILLIGRAM(S): 20 TABLET, DELAYED RELEASE ORAL at 06:06

## 2019-01-15 NOTE — PROGRESS NOTE ADULT - ASSESSMENT
69 yo F with PMHx of breast cancer, dementia, depression, seizures, h/o CVA x2 and Cerebral Aneurysm as per daughter presented with LOC/Fall. Found to be anemic, requiring PRBC transfusions.     Iron def Anemia possibly 2/2 GIB (hx of melanotic stools)  - received 1 unit PRBC 1/9 w/ improvement in cbc, hgb slowly downtrending  - GI recs appreciated   - s/p colonscopy 1/11 which showed moderate diverticulosis of sigmoid colon and polyps  - c/w PPI  - monitor cbc and transfuse prn to maintain hgb > 7  - per GI findings of diverticulosis or 3 moderately sized polyps could potentially explain enemia. If pt develops over bleeding or further drop in cbc, will consider repeat colonoscopy vs video capsule endoscopy   - per GI follow-up in office 2-3wks, high-fiber diet  - ferritin, vit 12 and folate wnl  - iron studies indicative of iron def anemia  - start ferrous sulfate    Worsening dementia (now at baseline)  - per staff pt at baseline mental status  - seen by psychiatry, lacks decision making capacity   - c/w Namenda and Seroquel  - MRI grossly unremarkable, although poor quality, no seizure activity on EEG  - Will need repeat MRI as outpatient given history of aneurysm (pt follows neuro as OP)    Prediabetes  - a1c 6.3  - glucose < 180 on bmps, will monitor    Seizure disorder  - c/w Lamictal     Cerebral aneurysm as per daughter  - c/w statin    Dispo: D/c planning. Patient needs long term placement as daughter can no longer care for her. CM aware. Patient's daughter Ashley Neff also the health care proxy [phone]

## 2019-01-15 NOTE — PROGRESS NOTE ADULT - SUBJECTIVE AND OBJECTIVE BOX
SNOYA YI  68y, Female  Allergy: No Known Allergies    Hospital Day: 14d    Patient seen and examined earlier today. Patient denies abdominal pain, N/V, fevers or chills.    PMH/PSH:  PAST MEDICAL & SURGICAL HISTORY:  Brain aneurysm  Seizures  Depression, unspecified depression type  Hyperlipidemia, unspecified hyperlipidemia type  Dementia  Breast cancer in female  H/O abdominal hysterectomy  H/O mastectomy, left        VITALS:  T(F): 96.9 (01-15-19 @ 06:07), Max: 98.7 (01-14-19 @ 14:00)  HR: 65 (01-15-19 @ 06:07)  BP: 136/61 (01-15-19 @ 06:07) (113/53 - 136/61)  RR: 16 (01-15-19 @ 06:07)  SpO2: --    TESTS & MEASUREMENTS:  Weight (Kg):   BMI (kg/m2): 32.3 (01-11)                          7.6    6.58  )-----------( 150      ( 15 Kentrell 2019 07:03 )             26.8         MEDICATIONS:  MEDICATIONS  (STANDING):  docusate sodium 100 milliGRAM(s) Oral two times a day  escitalopram 10 milliGRAM(s) Oral daily  ferrous    sulfate 325 milliGRAM(s) Oral two times a day  lamoTRIgine 100 milliGRAM(s) Oral two times a day  memantine 10 milliGRAM(s) Oral two times a day  pantoprazole    Tablet 40 milliGRAM(s) Oral two times a day  QUEtiapine 25 milliGRAM(s) Oral at bedtime  senna 2 Tablet(s) Oral at bedtime  simvastatin 20 milliGRAM(s) Oral at bedtime    MEDICATIONS  (PRN):  acetaminophen   Tablet .. 650 milliGRAM(s) Oral every 6 hours PRN Temp greater or equal to 38C (100.4F), Moderate Pain (4 - 6)      HOME MEDICATIONS:  escitalopram 10 mg oral tablet (01-01)  lamoTRIgine 100 mg oral tablet (01-01)  memantine 10 mg oral tablet (01-01)  QUEtiapine 25 mg oral tablet (01-01)  simvastatin 20 mg oral tablet (01-01)      PHYSICAL EXAM:  GENERAL: elderly female, in NAD, well-developed  HEAD:  Atraumatic, Normocephalic  CHEST/LUNG: Clear to auscultation bilaterally; No wheeze  HEART: Regular rate and rhythm; No murmurs, rubs, or gallops  ABDOMEN: Soft, Nontender, Nondistended; Bowel sounds present  EXTREMITIES:  2+ Peripheral Pulses, No clubbing, cyanosis, or edema  PSYCH: AAOx1

## 2019-01-15 NOTE — CHART NOTE - NSCHARTNOTEFT_GEN_A_CORE
RD limited follow-up to be completed 1/16 Registered Dietitian Limited Follow-Up    Iron def Anemia possibly 2/2 GIB (hx of melanotic stools). per GI findings of diverticulosis or 3 moderately sized polyps could potentially explain enemia. If pt develops over bleeding or further drop in cbc, will consider repeat colonoscopy vs video capsule endoscopy. per GI follow-up in office 2-3wks, high-fiber diet. RD d/w family - high fiber nutrition education.    Pt consumes % of most meals + meals brought in from home. Wt stable. Labs/meds reviewed. Pt appears to be meeting estimated nutrient needs at this time. No nutrition intervention at this time.

## 2019-01-16 LAB
HCT VFR BLD CALC: 28.6 % — LOW (ref 37–47)
HGB BLD-MCNC: 8.1 G/DL — LOW (ref 12–16)
MCHC RBC-ENTMCNC: 21.5 PG — LOW (ref 27–31)
MCHC RBC-ENTMCNC: 28.3 G/DL — LOW (ref 32–37)
MCV RBC AUTO: 75.9 FL — LOW (ref 81–99)
NRBC # BLD: 0 /100 WBCS — SIGNIFICANT CHANGE UP (ref 0–0)
PLATELET # BLD AUTO: 162 K/UL — SIGNIFICANT CHANGE UP (ref 130–400)
RBC # BLD: 3.77 M/UL — LOW (ref 4.2–5.4)
RBC # FLD: 20.3 % — HIGH (ref 11.5–14.5)
WBC # BLD: 7.4 K/UL — SIGNIFICANT CHANGE UP (ref 4.8–10.8)
WBC # FLD AUTO: 7.4 K/UL — SIGNIFICANT CHANGE UP (ref 4.8–10.8)

## 2019-01-16 RX ORDER — IRON SUCROSE 20 MG/ML
300 INJECTION, SOLUTION INTRAVENOUS ONCE
Qty: 0 | Refills: 0 | Status: COMPLETED | OUTPATIENT
Start: 2019-01-16 | End: 2019-01-16

## 2019-01-16 RX ADMIN — LAMOTRIGINE 100 MILLIGRAM(S): 25 TABLET, ORALLY DISINTEGRATING ORAL at 05:39

## 2019-01-16 RX ADMIN — Medication 100 MILLIGRAM(S): at 17:11

## 2019-01-16 RX ADMIN — PANTOPRAZOLE SODIUM 40 MILLIGRAM(S): 20 TABLET, DELAYED RELEASE ORAL at 17:11

## 2019-01-16 RX ADMIN — SENNA PLUS 2 TABLET(S): 8.6 TABLET ORAL at 21:34

## 2019-01-16 RX ADMIN — MEMANTINE HYDROCHLORIDE 10 MILLIGRAM(S): 10 TABLET ORAL at 17:12

## 2019-01-16 RX ADMIN — IRON SUCROSE 176.67 MILLIGRAM(S): 20 INJECTION, SOLUTION INTRAVENOUS at 17:12

## 2019-01-16 RX ADMIN — MEMANTINE HYDROCHLORIDE 10 MILLIGRAM(S): 10 TABLET ORAL at 05:39

## 2019-01-16 RX ADMIN — ESCITALOPRAM OXALATE 10 MILLIGRAM(S): 10 TABLET, FILM COATED ORAL at 11:15

## 2019-01-16 RX ADMIN — Medication 100 MILLIGRAM(S): at 05:39

## 2019-01-16 RX ADMIN — Medication 325 MILLIGRAM(S): at 05:39

## 2019-01-16 RX ADMIN — PANTOPRAZOLE SODIUM 40 MILLIGRAM(S): 20 TABLET, DELAYED RELEASE ORAL at 05:39

## 2019-01-16 RX ADMIN — Medication 325 MILLIGRAM(S): at 17:11

## 2019-01-16 RX ADMIN — SIMVASTATIN 20 MILLIGRAM(S): 20 TABLET, FILM COATED ORAL at 21:34

## 2019-01-16 RX ADMIN — LAMOTRIGINE 100 MILLIGRAM(S): 25 TABLET, ORALLY DISINTEGRATING ORAL at 17:12

## 2019-01-16 RX ADMIN — QUETIAPINE FUMARATE 25 MILLIGRAM(S): 200 TABLET, FILM COATED ORAL at 21:34

## 2019-01-16 NOTE — PROGRESS NOTE ADULT - SUBJECTIVE AND OBJECTIVE BOX
SONYA YI  68y  Female    Patient is a 68y old  Female brought in following a fall with LOC. (15 Kentrell 2019 13:05)      INTERVAL HPI/OVERNIGHT EVENTS:  No interval events.  Patient has no new complaints.       REVIEW OF SYSTEMS: UTo fully obtain due to Dementia.  CONSTITUTIONAL: No fever or fatigue  EYES: No eye pain  ENMT:  No difficulty hearing, No sinus or throat pain  NECK: No pain or stiffness  RESPIRATORY: No cough or shortness of breath  CARDIOVASCULAR: No chest pain  GASTROINTESTINAL: No abdominal or epigastric pain. No nausea, vomiting, or hematemesis; No diarrhea or constipation. GENITOURINARY: No dysuria  NEUROLOGICAL: No headaches  MUSCULOSKELETAL: No joint pain or swelling; No muscle, back, or extremity pain      VITALS:  T(F): 97.6 (01-16-19 @ 06:08), Max: 98.5 (01-15-19 @ 14:00)  HR: 77 (01-16-19 @ 06:08) (77 - 80)  BP: 120/65 (01-16-19 @ 06:08) (110/59 - 125/68)  RR: 16 (01-16-19 @ 06:08) (16 - 16)  SpO2: --      PHYSICAL EXAM:  GENERAL: NAD  HEAD:  Atraumatic, Normocephalic  EYES: conjunctiva and sclera clear  ENMT: Moist mucous membranes  NECK: Supple, Normal thyroid  NERVOUS SYSTEM:  Alert & Oriented X 1-2, Motor Strength 5/5 B/L upper and lower extremities  CHEST/LUNG: Clear to percussion bilaterally; No rales, rhonchi, wheezing, or rubs  HEART: Regular rate and rhythm; No murmurs, rubs, or gallops  ABDOMEN: Soft, Nontender, Nondistended; Bowel sounds present  EXTREMITIES:  2+ Peripheral Pulses, No clubbing, cyanosis, or edema  LYMPH: No lymphadenopathy noted  SKIN: please see nursing notes.    Consultant(s) Notes Reviewed:  [x ] YES  [ ] NO  Care Discussed with Consultants/Other Providers [ x] YES  [ ] NO    LABS:                        8.1    7.40  )-----------( 162      ( 16 Jan 2019 07:15 )             28.6         MICROBIOLOGY:   Culture - Urine (01.01.19 @ 23:50)    Specimen Source: .Urine Clean Catch (Midstream)    Culture Results:   10,000 - 49,000 CFU/mL Coag Negative Staphylococcus      RADIOLOGY & ADDITIONAL TESTS:  < from: CT Head No Cont (01.01.19 @ 10:36) >  1.  No evidence of acute intracranial pathology.  Stable exam since   4/11/2018.    2.  Stable severe chronic microvascular changes and chronic left   cerebellar infarct..        Xray Chest 1 View- PORTABLE-Urgent (01.01.19 @ 10:17)   Support devices: None.    Cardiac/mediastinum/hilum: Indeterminate    Lung parenchyma/Pleura: Within normal limits.    Skeleton/soft tissues: Unremarkable.    Impression:      No radiographic evidence of acute cardiopulmonary disease.      Imaging Personally Reviewed:  [x] YES  [ ] NO    MEDICATIONS  (STANDING):  docusate sodium 100 milliGRAM(s) Oral two times a day  escitalopram 10 milliGRAM(s) Oral daily  ferrous    sulfate 325 milliGRAM(s) Oral two times a day  lamoTRIgine 100 milliGRAM(s) Oral two times a day  memantine 10 milliGRAM(s) Oral two times a day  pantoprazole    Tablet 40 milliGRAM(s) Oral two times a day  QUEtiapine 25 milliGRAM(s) Oral at bedtime  senna 2 Tablet(s) Oral at bedtime  simvastatin 20 milliGRAM(s) Oral at bedtime    MEDICATIONS  (PRN):  acetaminophen   Tablet .. 650 milliGRAM(s) Oral every 6 hours PRN Temp greater or equal to 38C (100.4F), Moderate Pain (4 - 6)      HEALTH ISSUES - PROBLEM Dx:  Malignant neoplasm of left female breast, unspecified estrogen receptor status, unspecified site of breast  Hyperlipidemia, unspecified hyperlipidemia type  Dementia without behavioral disturbance, unspecified dementia type  Depression, unspecified depression type  Brain aneurysm  Seizures  Anemia, unspecified type

## 2019-01-16 NOTE — PROGRESS NOTE ADULT - ASSESSMENT
Patient is a 69 yo Female with PMHx of breast cancer, Dementia, depression, seizures, h/o CVA x 2 and Cerebral Aneurysm as per daughter presented following an episode of LOC/Fall. Patient lives alone and was found on the bathroom floor by daughter. As per daughter, she had seen her mom on camera, on the floor yelling that she felt dizzy. Patient mentioned feeling lethargic and dizzy but denied any nausea, vomiting, Diarrhea, fever, chills, CP, or SOB. Daughter had noted that she seemed more confused and had some slurred speech when she found her. Patient was found to be anemic with 5.9 Baseline 14 g/dl.  Daughter has limited information on pts hx because she has only started speaking to her in the last 2 months, but had been estranged for 7 years prior. Pt lived with her  who abandoned her in august of this year as per pts daughter.       Assessment and Plan:    1. Iron def Anemia:  % Saturation, Iron: 6 % (01.14.19 @ 07:18)  possibly 2/2 GIB (hx of melanotic stools)  s/p PRBC. No active bleeding noted. Hgb/hct stable.  GI consulted: s/p colonscopy 1/11 which showed moderate diverticulosis of sigmoid colon and polyps which could potentially explain Anemia. If patient develops overt bleeding or further drop in cbc, will consider repeat colonoscopy vs video capsule endoscopy.   GI follow-up in office 2-3wks, high-fiber diet.  Continue PPI, Venofer x 1 and PO Iron.  Monitor cbc and transfuse prn to maintain hgb > 7 g/dl.  Follow up with repeat iron study in 4-6 weeks.      2. Advanced Dementia:  Patient at baseline.  Psychiatry consulted: lacks decision making capacity.  Continue Namenda and Seroquel.  MRI grossly unremarkable, although poor quality, no seizure activity on EEG  Will need repeat MRI as outpatient given history of aneurysm and follow up with neurology outpatient.      3. Prediabetes:  a1c 6.3 %.  Monitor FS.      4. Seizure disorder:  Continue with Lamictal.      5. h/o Cerebral aneurysm:  Out patient follow up.      DVT prophylaxis: SCDs.  Disposition: Patient needs long term placement as daughter can no longer care for her. CM aware. Patient's daughter Ashley Neff also the health care proxy [phone]. Anticipated for discharge in the AM.

## 2019-01-17 RX ORDER — IRON SUCROSE 20 MG/ML
300 INJECTION, SOLUTION INTRAVENOUS ONCE
Qty: 0 | Refills: 0 | Status: COMPLETED | OUTPATIENT
Start: 2019-01-17 | End: 2019-01-17

## 2019-01-17 RX ORDER — FERROUS SULFATE 325(65) MG
1 TABLET ORAL
Qty: 15 | Refills: 0 | OUTPATIENT
Start: 2019-01-17 | End: 2019-02-15

## 2019-01-17 RX ORDER — PANTOPRAZOLE SODIUM 20 MG/1
1 TABLET, DELAYED RELEASE ORAL
Qty: 0 | Refills: 0 | COMMUNITY
Start: 2019-01-17

## 2019-01-17 RX ORDER — SENNA PLUS 8.6 MG/1
2 TABLET ORAL
Qty: 0 | Refills: 0 | COMMUNITY
Start: 2019-01-17

## 2019-01-17 RX ORDER — ACETAMINOPHEN 500 MG
2 TABLET ORAL
Qty: 0 | Refills: 0 | COMMUNITY
Start: 2019-01-17

## 2019-01-17 RX ORDER — DOCUSATE SODIUM 100 MG
1 CAPSULE ORAL
Qty: 0 | Refills: 0 | COMMUNITY
Start: 2019-01-17

## 2019-01-17 RX ADMIN — MEMANTINE HYDROCHLORIDE 10 MILLIGRAM(S): 10 TABLET ORAL at 06:23

## 2019-01-17 RX ADMIN — SIMVASTATIN 20 MILLIGRAM(S): 20 TABLET, FILM COATED ORAL at 21:00

## 2019-01-17 RX ADMIN — QUETIAPINE FUMARATE 25 MILLIGRAM(S): 200 TABLET, FILM COATED ORAL at 21:00

## 2019-01-17 RX ADMIN — LAMOTRIGINE 100 MILLIGRAM(S): 25 TABLET, ORALLY DISINTEGRATING ORAL at 06:23

## 2019-01-17 RX ADMIN — SENNA PLUS 2 TABLET(S): 8.6 TABLET ORAL at 21:00

## 2019-01-17 RX ADMIN — Medication 325 MILLIGRAM(S): at 17:31

## 2019-01-17 RX ADMIN — Medication 100 MILLIGRAM(S): at 06:23

## 2019-01-17 RX ADMIN — MEMANTINE HYDROCHLORIDE 10 MILLIGRAM(S): 10 TABLET ORAL at 17:31

## 2019-01-17 RX ADMIN — PANTOPRAZOLE SODIUM 40 MILLIGRAM(S): 20 TABLET, DELAYED RELEASE ORAL at 17:31

## 2019-01-17 RX ADMIN — ESCITALOPRAM OXALATE 10 MILLIGRAM(S): 10 TABLET, FILM COATED ORAL at 11:17

## 2019-01-17 RX ADMIN — Medication 325 MILLIGRAM(S): at 06:23

## 2019-01-17 RX ADMIN — LAMOTRIGINE 100 MILLIGRAM(S): 25 TABLET, ORALLY DISINTEGRATING ORAL at 17:31

## 2019-01-17 RX ADMIN — PANTOPRAZOLE SODIUM 40 MILLIGRAM(S): 20 TABLET, DELAYED RELEASE ORAL at 06:23

## 2019-01-17 NOTE — PROGRESS NOTE ADULT - SUBJECTIVE AND OBJECTIVE BOX
SONYA YI  68y  Female    Patient is a 68y old  Female brought in following a fall with LOC. (15 Kentrell 2019 13:05)      INTERVAL HPI/OVERNIGHT EVENTS:  No interval events.  Patient has no new complaints.       REVIEW OF SYSTEMS: UTO fully obtain due to Dementia.  CONSTITUTIONAL: No fever or fatigue  EYES: No eye pain  ENMT:  No difficulty hearing, No sinus or throat pain  NECK: No pain or stiffness  RESPIRATORY: No cough or shortness of breath  CARDIOVASCULAR: No chest pain  GASTROINTESTINAL: No abdominal or epigastric pain. No nausea, vomiting. No diarrhea or constipation.   GENITOURINARY: No dysuria  NEUROLOGICAL: No headaches  MUSCULOSKELETAL: No joint pain or swelling; No muscle, back, or extremity pain      VITALS:  T(C): 36.4 (17 Jan 2019 04:54), Max: 36.8 (16 Jan 2019 14:00)  T(F): 97.5 (17 Jan 2019 04:54), Max: 98.2 (16 Jan 2019 14:00)  HR: 75 (17 Jan 2019 04:54) (69 - 81)  BP: 130/72 (17 Jan 2019 04:54) (125/67 - 133/59)  BP(mean): --  RR: 16 (17 Jan 2019 04:54) (16 - 16)  SpO2: --        PHYSICAL EXAM:  GENERAL: NAD  HEAD:  Atraumatic, Normocephalic  EYES: conjunctiva and sclera clear  ENMT: Moist mucous membranes  NECK: Supple, Normal thyroid  NERVOUS SYSTEM:  Alert & Oriented X 1-2, Motor Strength 5/5 B/L upper and lower extremities  CHEST/LUNG: Clear to percussion bilaterally; No rales, rhonchi, wheezing, or rubs  HEART: Regular rate and rhythm; No murmurs, rubs, or gallops  ABDOMEN: Soft, Nontender, Nondistended; Bowel sounds present  EXTREMITIES:  2+ Peripheral Pulses, No clubbing, cyanosis, or edema  LYMPH: No lymphadenopathy noted  SKIN: please see nursing notes.    Consultant(s) Notes Reviewed:  [x ] YES  [ ] NO  Care Discussed with Consultants/Other Providers [ x] YES  [ ] NO    LABS:                        8.1    7.40  )-----------( 162      ( 16 Jan 2019 07:15 )             28.6         MICROBIOLOGY:   Culture - Urine (01.01.19 @ 23:50)    Specimen Source: .Urine Clean Catch (Midstream)    Culture Results:   10,000 - 49,000 CFU/mL Coag Negative Staphylococcus      RADIOLOGY & ADDITIONAL TESTS:  CT Head No Cont (01.01.19 @ 10:36)   1.  No evidence of acute intracranial pathology.  Stable exam since   4/11/2018.    2.  Stable severe chronic microvascular changes and chronic left   cerebellar infarct..        X-ray Chest 1 View- PORTABLE-Urgent (01.01.19 @ 10:17)   Support devices: None.    Cardiac/mediastinum/hilum: Indeterminate    Lung parenchyma/Pleura: Within normal limits.    Skeleton/soft tissues: Unremarkable.    Impression:      No radiographic evidence of acute cardiopulmonary disease.      Imaging Personally Reviewed:  [x] YES  [ ] NO    MEDICATIONS  (STANDING):  docusate sodium 100 milliGRAM(s) Oral two times a day  escitalopram 10 milliGRAM(s) Oral daily  ferrous    sulfate 325 milliGRAM(s) Oral two times a day  lamoTRIgine 100 milliGRAM(s) Oral two times a day  memantine 10 milliGRAM(s) Oral two times a day  pantoprazole    Tablet 40 milliGRAM(s) Oral two times a day  QUEtiapine 25 milliGRAM(s) Oral at bedtime  senna 2 Tablet(s) Oral at bedtime  simvastatin 20 milliGRAM(s) Oral at bedtime    MEDICATIONS  (PRN):  acetaminophen   Tablet .. 650 milliGRAM(s) Oral every 6 hours PRN Temp greater or equal to 38C (100.4F), Moderate Pain (4 - 6)      HEALTH ISSUES - PROBLEM Dx:  Malignant neoplasm of left female breast, unspecified estrogen receptor status, unspecified site of breast  Hyperlipidemia, unspecified hyperlipidemia type  Dementia without behavioral disturbance, unspecified dementia type  Depression, unspecified depression type  Brain aneurysm  Seizures  Anemia, unspecified type

## 2019-01-17 NOTE — PROGRESS NOTE ADULT - ASSESSMENT
Patient is a 67 yo Female with PMHx of breast cancer, Dementia, depression, seizures, h/o CVA x 2 and Cerebral Aneurysm as per daughter presented following an episode of LOC/Fall. Patient lives alone and was found on the bathroom floor by daughter. As per daughter, she had seen her mom on camera, on the floor yelling that she felt dizzy. Patient mentioned feeling lethargic and dizzy but denied any nausea, vomiting, Diarrhea, fever, chills, CP, or SOB. Daughter had noted that she seemed more confused and had some slurred speech when she found her. Patient was found to be anemic with 5.9 Baseline 14 g/dl.  Daughter has limited information on pts hx because she has only started speaking to her in the last 2 months, but had been estranged for 7 years prior. Pt lived with her  who abandoned her in august of this year as per pts daughter.       Assessment and Plan:    1. Iron def Anemia:  % Saturation, Iron: 6 % (01.14.19 @ 07:18)  possibly 2/2 GIB (hx of melanotic stools)  s/p PRBC. No active bleeding noted. Hgb/hct stable.  GI consulted: s/p colonscopy 1/11 which showed moderate diverticulosis of sigmoid colon and polyps which could potentially explain Anemia. If patient develops overt bleeding or further drop in cbc, will consider repeat colonoscopy vs video capsule endoscopy.   GI follow-up in office 2-3wks, high-fiber diet.  Continue PPI & PO iron. s/p Venofer x 2  Monitor cbc and transfuse prn to maintain hgb > 7 g/dl.  Follow up with repeat iron study in 4-6 weeks.      2. Advanced Dementia:  Patient at baseline.  Psychiatry consulted: lacks decision making capacity.  Continue Namenda and Seroquel.  MRI grossly unremarkable, although poor quality, no seizure activity on EEG  Will need repeat MRI as outpatient given history of aneurysm and follow up with neurology outpatient.      3. Prediabetes:  a1c 6.3 %.  Monitor FS.      4. Seizure disorder:  Continue with Lamictal.      5. h/o Cerebral aneurysm:  Out patient follow up.      DVT prophylaxis: SCDs.  Disposition: Patient needs long term placement as daughter can no longer care for her. CM aware. Patient's daughter Ashley Neff also the health care proxy [phone]. Discharge today.

## 2019-01-18 VITALS
TEMPERATURE: 98 F | RESPIRATION RATE: 16 BRPM | DIASTOLIC BLOOD PRESSURE: 56 MMHG | SYSTOLIC BLOOD PRESSURE: 112 MMHG | HEART RATE: 69 BPM

## 2019-01-18 RX ADMIN — PANTOPRAZOLE SODIUM 40 MILLIGRAM(S): 20 TABLET, DELAYED RELEASE ORAL at 06:07

## 2019-01-18 RX ADMIN — Medication 100 MILLIGRAM(S): at 06:07

## 2019-01-18 RX ADMIN — MEMANTINE HYDROCHLORIDE 10 MILLIGRAM(S): 10 TABLET ORAL at 17:12

## 2019-01-18 RX ADMIN — LAMOTRIGINE 100 MILLIGRAM(S): 25 TABLET, ORALLY DISINTEGRATING ORAL at 17:11

## 2019-01-18 RX ADMIN — LAMOTRIGINE 100 MILLIGRAM(S): 25 TABLET, ORALLY DISINTEGRATING ORAL at 06:07

## 2019-01-18 RX ADMIN — Medication 325 MILLIGRAM(S): at 17:12

## 2019-01-18 RX ADMIN — PANTOPRAZOLE SODIUM 40 MILLIGRAM(S): 20 TABLET, DELAYED RELEASE ORAL at 17:12

## 2019-01-18 RX ADMIN — MEMANTINE HYDROCHLORIDE 10 MILLIGRAM(S): 10 TABLET ORAL at 06:07

## 2019-01-18 RX ADMIN — Medication 100 MILLIGRAM(S): at 17:12

## 2019-01-18 RX ADMIN — Medication 325 MILLIGRAM(S): at 06:07

## 2019-01-18 RX ADMIN — ESCITALOPRAM OXALATE 10 MILLIGRAM(S): 10 TABLET, FILM COATED ORAL at 11:10

## 2019-01-18 NOTE — PROGRESS NOTE ADULT - NSHPATTENDINGPLANDISCUSS_GEN_ALL_CORE
House staff and family.
patient, , CM
patient, HS
care team

## 2019-01-18 NOTE — PROGRESS NOTE ADULT - ASSESSMENT
Patient is a 69 yo Female with PMHx of breast cancer, Dementia, depression, seizures, h/o CVA x 2 and Cerebral Aneurysm as per daughter presented following an episode of LOC/Fall. Patient lives alone and was found on the bathroom floor by daughter. As per daughter, she had seen her mom on camera, on the floor yelling that she felt dizzy. Patient mentioned feeling lethargic and dizzy but denied any nausea, vomiting, Diarrhea, fever, chills, CP, or SOB. Daughter had noted that she seemed more confused and had some slurred speech when she found her. Patient was found to be anemic with 5.9 Baseline 14 g/dl.  Daughter has limited information on pts hx because she has only started speaking to her in the last 2 months, but had been estranged for 7 years prior. Pt lived with her  who abandoned her in august of this year as per pts daughter.       Assessment and Plan:    1. Iron def Anemia:  % Saturation, Iron: 6 % (01.14.19 @ 07:18)  possibly 2/2 GIB (hx of melanotic stools)  s/p PRBC. No active bleeding noted. Hgb/hct stable.  GI consulted: s/p colonscopy 1/11 which showed moderate diverticulosis of sigmoid colon and polyps which could potentially explain Anemia. If patient develops overt bleeding or further drop in cbc, will consider repeat colonoscopy vs video capsule endoscopy.   GI follow-up in office 2-3wks, high-fiber diet.  Continue PPI & PO iron. s/p Venofer x 1.  Monitor cbc and transfuse prn to maintain hgb > 7 g/dl.  Follow up with repeat iron study in 4-6 weeks.      2. Advanced Dementia:  Patient at baseline.  Psychiatry consulted: lacks decision making capacity.  Continue Namenda and Seroquel.  MRI grossly unremarkable, although poor quality, no seizure activity on EEG  Will need repeat MRI as outpatient given history of aneurysm and follow up with neurology outpatient.      3. Prediabetes:  a1c 6.3 %.  Monitor FS.      4. Seizure disorder:  Continue with Lamictal.      5. h/o Cerebral aneurysm:  Out patient follow up.      DVT prophylaxis: SCDs.  Disposition: Patient needs long term placement as daughter can no longer care for her. CM aware. Patient's daughter Ashley Neff also the health care proxy [phone].

## 2019-01-18 NOTE — PROGRESS NOTE ADULT - SUBJECTIVE AND OBJECTIVE BOX
SONYA YI  68y  Female    Patient is a 68y old  Female brought in following a fall with LOC. (15 [place] 2019 13:05)      INTERVAL HPI/OVERNIGHT EVENTS:  No interval events.  Patient has no new complaints. Still waiting for placement.      REVIEW OF SYSTEMS: UTO fully obtain due to Dementia.  CONSTITUTIONAL: No fever or fatigue  EYES: No eye pain  ENMT:  No difficulty hearing, No sinus or throat pain  NECK: No pain or stiffness  RESPIRATORY: No cough or shortness of breath  CARDIOVASCULAR: No chest pain  GASTROINTESTINAL: No abdominal or epigastric pain. No nausea, vomiting. No diarrhea or constipation.   GENITOURINARY: No dysuria  NEUROLOGICAL: No headaches  MUSCULOSKELETAL: No joint pain or swelling; No muscle, back, or extremity pain      VITALS:  T(C): 36.1 (18 Jan 2019 06:16), Max: 37.1 (17 Jan 2019 13:51)  T(F): 97 (18 Jan 2019 06:16), Max: 98.8 (17 Jan 2019 21:50)  HR: 75 (18 Jan 2019 06:16) (72 - 85)  BP: 126/61 (18 Jan 2019 06:16) (114/57 - 132/62)  BP(mean): --  RR: 16 (18 Jan 2019 06:16) (16 - 18)  SpO2: --        PHYSICAL EXAM:  GENERAL: NAD  HEAD:  Atraumatic, Normocephalic  EYES: conjunctiva and sclera clear  ENMT: Moist mucous membranes  NECK: Supple, Normal thyroid  NERVOUS SYSTEM:  Alert & Oriented X 1-2, Motor Strength 5/5 B/L upper and lower extremities  CHEST/LUNG: Clear to percussion bilaterally; No rales, rhonchi, wheezing, or rubs  HEART: Regular rate and rhythm; No murmurs, rubs, or gallops  ABDOMEN: Soft, Nontender, Nondistended; Bowel sounds present  EXTREMITIES:  2+ Peripheral Pulses, No clubbing, cyanosis, or edema  LYMPH: No lymphadenopathy noted  SKIN: please see nursing notes.    Consultant(s) Notes Reviewed:  [x ] YES  [ ] NO  Care Discussed with Consultants/Other Providers [ x] YES  [ ] NO    LABS:                        8.1    7.40  )-----------( 162      ( 16 Jan 2019 07:15 )             28.6         MICROBIOLOGY:   Culture - Urine (01.01.19 @ 23:50)    Specimen Source: .Urine Clean Catch (Midstream)    Culture Results:   10,000 - 49,000 CFU/mL Coag Negative Staphylococcus      RADIOLOGY & ADDITIONAL TESTS:  CT Head No Cont (01.01.19 @ 10:36)   1.  No evidence of acute intracranial pathology.  Stable exam since   4/11/2018.    2.  Stable severe chronic microvascular changes and chronic left   cerebellar infarct..        X-ray Chest 1 View- PORTABLE-Urgent (01.01.19 @ 10:17)   Support devices: None.    Cardiac/mediastinum/hilum: Indeterminate    Lung parenchyma/Pleura: Within normal limits.    Skeleton/soft tissues: Unremarkable.    Impression:      No radiographic evidence of acute cardiopulmonary disease.      Imaging Personally Reviewed:  [x] YES  [ ] NO    MEDICATIONS  (STANDING):  docusate sodium 100 milliGRAM(s) Oral two times a day  escitalopram 10 milliGRAM(s) Oral daily  ferrous    sulfate 325 milliGRAM(s) Oral two times a day  lamoTRIgine 100 milliGRAM(s) Oral two times a day  memantine 10 milliGRAM(s) Oral two times a day  pantoprazole    Tablet 40 milliGRAM(s) Oral two times a day  QUEtiapine 25 milliGRAM(s) Oral at bedtime  senna 2 Tablet(s) Oral at bedtime  simvastatin 20 milliGRAM(s) Oral at bedtime    MEDICATIONS  (PRN):  acetaminophen   Tablet .. 650 milliGRAM(s) Oral every 6 hours PRN Temp greater or equal to 38C (100.4F), Moderate Pain (4 - 6)        HEALTH ISSUES - PROBLEM Dx:  Malignant neoplasm of left female breast, unspecified estrogen receptor status, unspecified site of breast  Hyperlipidemia, unspecified hyperlipidemia type  Dementia without behavioral disturbance, unspecified dementia type  Depression, unspecified depression type  Brain aneurysm  Seizures  Anemia, unspecified type

## 2019-01-19 ENCOUNTER — OUTPATIENT (OUTPATIENT)
Dept: OUTPATIENT SERVICES | Facility: HOSPITAL | Age: 69
LOS: 1 days | Discharge: HOME | End: 2019-01-19

## 2019-01-19 DIAGNOSIS — F32.9 MAJOR DEPRESSIVE DISORDER, SINGLE EPISODE, UNSPECIFIED: ICD-10-CM

## 2019-01-19 DIAGNOSIS — Z98.890 OTHER SPECIFIED POSTPROCEDURAL STATES: Chronic | ICD-10-CM

## 2019-01-19 DIAGNOSIS — G40.89 OTHER SEIZURES: ICD-10-CM

## 2019-01-19 DIAGNOSIS — F03.90 UNSPECIFIED DEMENTIA WITHOUT BEHAVIORAL DISTURBANCE: ICD-10-CM

## 2019-01-19 PROBLEM — I67.1 CEREBRAL ANEURYSM, NONRUPTURED: Chronic | Status: ACTIVE | Noted: 2019-01-01

## 2019-01-19 PROBLEM — R56.9 UNSPECIFIED CONVULSIONS: Chronic | Status: ACTIVE | Noted: 2019-01-01

## 2019-01-19 PROBLEM — E78.5 HYPERLIPIDEMIA, UNSPECIFIED: Chronic | Status: ACTIVE | Noted: 2019-01-01

## 2019-01-22 DIAGNOSIS — Z74.2 NEED FOR ASSISTANCE AT HOME AND NO OTHER HOUSEHOLD MEMBER ABLE TO RENDER CARE: ICD-10-CM

## 2019-01-22 DIAGNOSIS — K22.70 BARRETT'S ESOPHAGUS WITHOUT DYSPLASIA: ICD-10-CM

## 2019-01-22 DIAGNOSIS — D12.0 BENIGN NEOPLASM OF CECUM: ICD-10-CM

## 2019-01-22 DIAGNOSIS — Z85.3 PERSONAL HISTORY OF MALIGNANT NEOPLASM OF BREAST: ICD-10-CM

## 2019-01-22 DIAGNOSIS — F03.90 UNSPECIFIED DEMENTIA WITHOUT BEHAVIORAL DISTURBANCE: ICD-10-CM

## 2019-01-22 DIAGNOSIS — E78.5 HYPERLIPIDEMIA, UNSPECIFIED: ICD-10-CM

## 2019-01-22 DIAGNOSIS — Z60.2 PROBLEMS RELATED TO LIVING ALONE: ICD-10-CM

## 2019-01-22 DIAGNOSIS — Z90.12 ACQUIRED ABSENCE OF LEFT BREAST AND NIPPLE: ICD-10-CM

## 2019-01-22 DIAGNOSIS — F32.9 MAJOR DEPRESSIVE DISORDER, SINGLE EPISODE, UNSPECIFIED: ICD-10-CM

## 2019-01-22 DIAGNOSIS — K29.70 GASTRITIS, UNSPECIFIED, WITHOUT BLEEDING: ICD-10-CM

## 2019-01-22 DIAGNOSIS — K63.5 POLYP OF COLON: ICD-10-CM

## 2019-01-22 DIAGNOSIS — Z90.710 ACQUIRED ABSENCE OF BOTH CERVIX AND UTERUS: ICD-10-CM

## 2019-01-22 DIAGNOSIS — R47.81 SLURRED SPEECH: ICD-10-CM

## 2019-01-22 DIAGNOSIS — R73.03 PREDIABETES: ICD-10-CM

## 2019-01-22 DIAGNOSIS — K57.30 DIVERTICULOSIS OF LARGE INTESTINE WITHOUT PERFORATION OR ABSCESS WITHOUT BLEEDING: ICD-10-CM

## 2019-01-22 DIAGNOSIS — D50.9 IRON DEFICIENCY ANEMIA, UNSPECIFIED: ICD-10-CM

## 2019-01-22 DIAGNOSIS — F17.210 NICOTINE DEPENDENCE, CIGARETTES, UNCOMPLICATED: ICD-10-CM

## 2019-01-22 DIAGNOSIS — K29.80 DUODENITIS WITHOUT BLEEDING: ICD-10-CM

## 2019-01-22 SDOH — SOCIAL STABILITY - SOCIAL INSECURITY: PROBLEMS RELATED TO LIVING ALONE: Z60.2

## 2019-03-14 ENCOUNTER — INPATIENT (INPATIENT)
Facility: HOSPITAL | Age: 69
LOS: 6 days | Discharge: HOME HEALTH | End: 2019-03-21
Attending: INTERNAL MEDICINE | Admitting: INTERNAL MEDICINE

## 2019-03-14 VITALS
SYSTOLIC BLOOD PRESSURE: 137 MMHG | RESPIRATION RATE: 18 BRPM | OXYGEN SATURATION: 97 % | DIASTOLIC BLOOD PRESSURE: 100 MMHG | HEART RATE: 59 BPM | TEMPERATURE: 99 F

## 2019-03-14 DIAGNOSIS — Z98.890 OTHER SPECIFIED POSTPROCEDURAL STATES: Chronic | ICD-10-CM

## 2019-03-14 DIAGNOSIS — R41.89 OTHER SYMPTOMS AND SIGNS INVOLVING COGNITIVE FUNCTIONS AND AWARENESS: ICD-10-CM

## 2019-03-14 LAB
ALBUMIN SERPL ELPH-MCNC: 3.9 G/DL — SIGNIFICANT CHANGE UP (ref 3.5–5.2)
ALP SERPL-CCNC: 87 U/L — SIGNIFICANT CHANGE UP (ref 30–115)
ALT FLD-CCNC: 26 U/L — SIGNIFICANT CHANGE UP (ref 0–41)
AMMONIA BLD-MCNC: 29 UMOL/L — SIGNIFICANT CHANGE UP (ref 11–55)
ANION GAP SERPL CALC-SCNC: 12 MMOL/L — SIGNIFICANT CHANGE UP (ref 7–14)
APAP SERPL-MCNC: <5 UG/ML — LOW (ref 10–30)
APPEARANCE UR: CLEAR — SIGNIFICANT CHANGE UP
AST SERPL-CCNC: 48 U/L — HIGH (ref 0–41)
BASOPHILS # BLD AUTO: 0.03 K/UL — SIGNIFICANT CHANGE UP (ref 0–0.2)
BASOPHILS NFR BLD AUTO: 0.4 % — SIGNIFICANT CHANGE UP (ref 0–1)
BILIRUB SERPL-MCNC: 0.3 MG/DL — SIGNIFICANT CHANGE UP (ref 0.2–1.2)
BILIRUB UR-MCNC: NEGATIVE — SIGNIFICANT CHANGE UP
BUN SERPL-MCNC: 17 MG/DL — SIGNIFICANT CHANGE UP (ref 10–20)
CALCIUM SERPL-MCNC: 9.4 MG/DL — SIGNIFICANT CHANGE UP (ref 8.5–10.1)
CHLORIDE SERPL-SCNC: 103 MMOL/L — SIGNIFICANT CHANGE UP (ref 98–110)
CO2 SERPL-SCNC: 28 MMOL/L — SIGNIFICANT CHANGE UP (ref 17–32)
COLOR SPEC: YELLOW — SIGNIFICANT CHANGE UP
CREAT SERPL-MCNC: 0.8 MG/DL — SIGNIFICANT CHANGE UP (ref 0.7–1.5)
DIFF PNL FLD: NEGATIVE — SIGNIFICANT CHANGE UP
EOSINOPHIL # BLD AUTO: 0.24 K/UL — SIGNIFICANT CHANGE UP (ref 0–0.7)
EOSINOPHIL NFR BLD AUTO: 3.3 % — SIGNIFICANT CHANGE UP (ref 0–8)
ETHANOL SERPL-MCNC: <10 MG/DL — HIGH
GLUCOSE SERPL-MCNC: 235 MG/DL — HIGH (ref 70–99)
GLUCOSE UR QL: 100 MG/DL
HCT VFR BLD CALC: 29.1 % — LOW (ref 37–47)
HGB BLD-MCNC: 8.2 G/DL — LOW (ref 12–16)
IMM GRANULOCYTES NFR BLD AUTO: 0.4 % — HIGH (ref 0.1–0.3)
KETONES UR-MCNC: NEGATIVE — SIGNIFICANT CHANGE UP
LEUKOCYTE ESTERASE UR-ACNC: NEGATIVE — SIGNIFICANT CHANGE UP
LYMPHOCYTES # BLD AUTO: 1.38 K/UL — SIGNIFICANT CHANGE UP (ref 1.2–3.4)
LYMPHOCYTES # BLD AUTO: 19.2 % — LOW (ref 20.5–51.1)
MAGNESIUM SERPL-MCNC: 1.7 MG/DL — LOW (ref 1.8–2.4)
MCHC RBC-ENTMCNC: 21.9 PG — LOW (ref 27–31)
MCHC RBC-ENTMCNC: 28.2 G/DL — LOW (ref 32–37)
MCV RBC AUTO: 77.6 FL — LOW (ref 81–99)
MONOCYTES # BLD AUTO: 0.52 K/UL — SIGNIFICANT CHANGE UP (ref 0.1–0.6)
MONOCYTES NFR BLD AUTO: 7.2 % — SIGNIFICANT CHANGE UP (ref 1.7–9.3)
NEUTROPHILS # BLD AUTO: 5 K/UL — SIGNIFICANT CHANGE UP (ref 1.4–6.5)
NEUTROPHILS NFR BLD AUTO: 69.5 % — SIGNIFICANT CHANGE UP (ref 42.2–75.2)
NITRITE UR-MCNC: NEGATIVE — SIGNIFICANT CHANGE UP
NRBC # BLD: 0 /100 WBCS — SIGNIFICANT CHANGE UP (ref 0–0)
PH UR: 6 — SIGNIFICANT CHANGE UP (ref 5–8)
PLATELET # BLD AUTO: 163 K/UL — SIGNIFICANT CHANGE UP (ref 130–400)
POTASSIUM SERPL-MCNC: 4 MMOL/L — SIGNIFICANT CHANGE UP (ref 3.5–5)
POTASSIUM SERPL-SCNC: 4 MMOL/L — SIGNIFICANT CHANGE UP (ref 3.5–5)
PROT SERPL-MCNC: 7.1 G/DL — SIGNIFICANT CHANGE UP (ref 6–8)
PROT UR-MCNC: NEGATIVE MG/DL — SIGNIFICANT CHANGE UP
RBC # BLD: 3.75 M/UL — LOW (ref 4.2–5.4)
RBC # FLD: 18.4 % — HIGH (ref 11.5–14.5)
SALICYLATES SERPL-MCNC: <0.3 MG/DL — LOW (ref 4–30)
SODIUM SERPL-SCNC: 143 MMOL/L — SIGNIFICANT CHANGE UP (ref 135–146)
SP GR SPEC: 1.02 — SIGNIFICANT CHANGE UP (ref 1.01–1.03)
UROBILINOGEN FLD QL: 0.2 MG/DL — SIGNIFICANT CHANGE UP (ref 0.2–0.2)
WBC # BLD: 7.2 K/UL — SIGNIFICANT CHANGE UP (ref 4.8–10.8)
WBC # FLD AUTO: 7.2 K/UL — SIGNIFICANT CHANGE UP (ref 4.8–10.8)

## 2019-03-14 RX ORDER — LAMOTRIGINE 25 MG/1
1 TABLET, ORALLY DISINTEGRATING ORAL
Qty: 0 | Refills: 0 | COMMUNITY

## 2019-03-14 RX ORDER — SODIUM CHLORIDE 9 MG/ML
1000 INJECTION, SOLUTION INTRAVENOUS ONCE
Qty: 0 | Refills: 0 | Status: COMPLETED | OUTPATIENT
Start: 2019-03-14 | End: 2019-03-14

## 2019-03-14 RX ORDER — FUROSEMIDE 40 MG
1 TABLET ORAL
Qty: 0 | Refills: 0 | COMMUNITY

## 2019-03-14 RX ORDER — ESCITALOPRAM OXALATE 10 MG/1
10 TABLET, FILM COATED ORAL DAILY
Qty: 0 | Refills: 0 | Status: DISCONTINUED | OUTPATIENT
Start: 2019-03-14 | End: 2019-03-21

## 2019-03-14 RX ORDER — SENNA PLUS 8.6 MG/1
2 TABLET ORAL AT BEDTIME
Qty: 0 | Refills: 0 | Status: DISCONTINUED | OUTPATIENT
Start: 2019-03-14 | End: 2019-03-21

## 2019-03-14 RX ORDER — MEMANTINE HYDROCHLORIDE 10 MG/1
1 TABLET ORAL
Qty: 0 | Refills: 0 | COMMUNITY

## 2019-03-14 RX ORDER — QUETIAPINE FUMARATE 200 MG/1
25 TABLET, FILM COATED ORAL AT BEDTIME
Qty: 0 | Refills: 0 | Status: DISCONTINUED | OUTPATIENT
Start: 2019-03-14 | End: 2019-03-16

## 2019-03-14 RX ORDER — HEPARIN SODIUM 5000 [USP'U]/ML
5000 INJECTION INTRAVENOUS; SUBCUTANEOUS EVERY 8 HOURS
Qty: 0 | Refills: 0 | Status: DISCONTINUED | OUTPATIENT
Start: 2019-03-14 | End: 2019-03-21

## 2019-03-14 RX ORDER — FUROSEMIDE 40 MG
40 TABLET ORAL DAILY
Qty: 0 | Refills: 0 | Status: DISCONTINUED | OUTPATIENT
Start: 2019-03-14 | End: 2019-03-21

## 2019-03-14 RX ORDER — PANTOPRAZOLE SODIUM 20 MG/1
40 TABLET, DELAYED RELEASE ORAL
Qty: 0 | Refills: 0 | Status: DISCONTINUED | OUTPATIENT
Start: 2019-03-14 | End: 2019-03-21

## 2019-03-14 RX ORDER — DOCUSATE SODIUM 100 MG
100 CAPSULE ORAL
Qty: 0 | Refills: 0 | Status: DISCONTINUED | OUTPATIENT
Start: 2019-03-14 | End: 2019-03-21

## 2019-03-14 RX ORDER — ESCITALOPRAM OXALATE 10 MG/1
1 TABLET, FILM COATED ORAL
Qty: 0 | Refills: 0 | COMMUNITY

## 2019-03-14 RX ORDER — SIMVASTATIN 20 MG/1
20 TABLET, FILM COATED ORAL AT BEDTIME
Qty: 0 | Refills: 0 | Status: DISCONTINUED | OUTPATIENT
Start: 2019-03-14 | End: 2019-03-21

## 2019-03-14 RX ORDER — LAMOTRIGINE 25 MG/1
100 TABLET, ORALLY DISINTEGRATING ORAL
Qty: 0 | Refills: 0 | Status: DISCONTINUED | OUTPATIENT
Start: 2019-03-14 | End: 2019-03-21

## 2019-03-14 RX ORDER — FERROUS SULFATE 325(65) MG
325 TABLET ORAL
Qty: 0 | Refills: 0 | Status: DISCONTINUED | OUTPATIENT
Start: 2019-03-14 | End: 2019-03-21

## 2019-03-14 RX ORDER — MEMANTINE HYDROCHLORIDE 10 MG/1
10 TABLET ORAL
Qty: 0 | Refills: 0 | Status: DISCONTINUED | OUTPATIENT
Start: 2019-03-14 | End: 2019-03-21

## 2019-03-14 RX ORDER — SIMVASTATIN 20 MG/1
1 TABLET, FILM COATED ORAL
Qty: 0 | Refills: 0 | COMMUNITY

## 2019-03-14 RX ADMIN — MEMANTINE HYDROCHLORIDE 10 MILLIGRAM(S): 10 TABLET ORAL at 18:21

## 2019-03-14 RX ADMIN — SIMVASTATIN 20 MILLIGRAM(S): 20 TABLET, FILM COATED ORAL at 21:52

## 2019-03-14 RX ADMIN — Medication 40 MILLIGRAM(S): at 18:21

## 2019-03-14 RX ADMIN — Medication 325 MILLIGRAM(S): at 18:21

## 2019-03-14 RX ADMIN — Medication 100 MILLIGRAM(S): at 18:21

## 2019-03-14 RX ADMIN — LAMOTRIGINE 100 MILLIGRAM(S): 25 TABLET, ORALLY DISINTEGRATING ORAL at 18:21

## 2019-03-14 RX ADMIN — QUETIAPINE FUMARATE 25 MILLIGRAM(S): 200 TABLET, FILM COATED ORAL at 21:52

## 2019-03-14 RX ADMIN — PANTOPRAZOLE SODIUM 40 MILLIGRAM(S): 20 TABLET, DELAYED RELEASE ORAL at 18:21

## 2019-03-14 RX ADMIN — ESCITALOPRAM OXALATE 10 MILLIGRAM(S): 10 TABLET, FILM COATED ORAL at 18:56

## 2019-03-14 RX ADMIN — SODIUM CHLORIDE 2000 MILLILITER(S): 9 INJECTION, SOLUTION INTRAVENOUS at 12:38

## 2019-03-14 NOTE — ED PROVIDER NOTE - ATTENDING CONTRIBUTION TO CARE
68y female above PMH presents for progressive cognitive decline as well as persistent delusions as above, on exam patient pleasant, engaging, AAO x 2 and jokes with examiner, vital signs appreciated (refuses rectal temp) head nc/at, perrla, neck supple cor rrr lungs cta abd snt/nd neuro nonfocal, labs and studies reviewed, will admit for placement

## 2019-03-14 NOTE — ED BEHAVIORAL HEALTH ASSESSMENT NOTE - DESCRIPTION
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

## 2019-03-14 NOTE — H&P ADULT - NSHPLABSRESULTS_GEN_ALL_CORE
8.2    7.20  )-----------( 163      ( 14 Mar 2019 12:52 )             29.1           143  |  103  |  17  ----------------------------<  235<H>  4.0   |  28  |  0.8    Ca    9.4      14 Mar 2019 12:52  Mg     1.7         TPro  7.1  /  Alb  3.9  /  TBili  0.3  /  DBili  x   /  AST  48<H>  /  ALT  26  /  AlkPhos  87            Magnesium, Serum: 1.7 mg/dL (19 @ 12:52)          Urinalysis Basic - ( 14 Mar 2019 13:15 )    Color: Yellow / Appearance: Clear / S.020 / pH: x  Gluc: x / Ketone: Negative  / Bili: Negative / Urobili: 0.2 mg/dL   Blood: x / Protein: Negative mg/dL / Nitrite: Negative   Leuk Esterase: Negative / RBC: x / WBC x   Sq Epi: x / Non Sq Epi: x / Bacteria: x          < from: CT Head No Cont (19 @ 13:24) >      EXAM:  CT BRAIN            PROCEDURE DATE:  2019            INTERPRETATION:  Clinical History / Reason for exam:  Altered mental   status.    Technique:  Multiple contiguous axial CT images of the brain were   obtained from base to vertex without administration of intravenous   contrast.      Comparison: CT head 2019. MRI of the brain 1/3/2019    Impression:      No evidence of acute intracranial pathology.    Moderate to severe chronic microvascular ischemic changes, not   significantly changed. Small chronic left cerebellar infarction.                  ALVERTO COLLINS M.D., ATTENDING RADIOLOGIST  This document has been electronically signed. Mar 14 2019  1:35PM              < end of copied text >    < from: Xray Chest 1 View- PORTABLE-Urgent (19 @ 12:34) >    EXAM:  XR CHEST PORTABLE URGENT 1V            PROCEDURE DATE:  2019      Impression:      No radiographic evidence of acute cardiopulmonary disease.          YUDI DOWLING M.D., ATTENDING RADIOLOGIST  This document has been electronically signed. Mar 14 2019  1:08PM              < end of copied text >
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
## 2019-03-14 NOTE — ED PROVIDER NOTE - NS ED ROS FT
Review of Systems    Constitutional: (-) fever  Eyes/ENT: (-) change in vision, (-) sore throat, (-) ear pain  Cardiovascular: (-) chest pain, (-) palpitation   Respiratory: (-) cough, (-) shortness of breath  Gastrointestinal: (-) abdominal pain (-) vomiting, (-) diarrhea  : SEE HPI  Musculoskeletal: (-) neck pain, (-) back pain, (-) joint pain  Integumentary: (-) rash, (-) edema  Neurological: (-) headache  Heme/Lymph: (-) easy bruising (-) easy bleeding

## 2019-03-14 NOTE — ED PROVIDER NOTE - PROGRESS NOTE DETAILS
Pt decline rectal temp measurement Pt daughter at bedside Ashley Neff [phone] is the source of collateral information.

## 2019-03-14 NOTE — ED PROVIDER NOTE - PHYSICAL EXAMINATION
Physical Exam    Vital Signs: I have reviewed the initial vital signs.  Constitutional: well-nourished, appears stated age, no acute distress  Eyes: PERRLA, and symmetrical lids.  ENT: Neck supple with no adenopathy, moist MM.  Cardiovascular: regular rate, regular rhythm, well-perfused extremities  Respiratory: unlabored respiratory effort, clear to auscultation bilaterally  Gastrointestinal: soft, non-tender abdomen  Musculoskeletal: supple neck, no lower extremity edema  Integumentary: warm, dry, no rash  Neurologic: awake, alert, cranial nerves II-XII grossly intact, extremities’ motor and sensory functions grossly intact, no ataxia, no dysmetria  Psychiatric: oriented to self and person

## 2019-03-14 NOTE — H&P ADULT - NSICDXPROBLEM_GEN_ALL_CORE_FT
PROBLEM DIAGNOSES  Problem: Cognitive decline  Assessment and Plan: Psych FUP for medication adjustment

## 2019-03-14 NOTE — ED BEHAVIORAL HEALTH ASSESSMENT NOTE - SUMMARY
67 yo MWF w several years of cognitive decline presents with odd behavior, likely delusional, disoriented. pt with no acute psychiatric issues. Acute agitation may be secondary to delirium on dementia. Will benefit from further medical workup and arrangements for dispo as pt may not be safe to live at home.

## 2019-03-14 NOTE — H&P ADULT - NSHPPHYSICALEXAM_GEN_ALL_CORE
Vital Signs Last 24 Hrs  T(C): 37.2 (14 Mar 2019 11:28), Max: 37.2 (14 Mar 2019 11:28)  T(F): 99 (14 Mar 2019 11:28), Max: 99 (14 Mar 2019 11:28)  HR: 59 (14 Mar 2019 11:28) (59 - 59)  BP: 137/100 (14 Mar 2019 11:28) (137/100 - 137/100)  BP(mean): --  RR: 18 (14 Mar 2019 11:28) (18 - 18)  SpO2: 97% (14 Mar 2019 11:28) (97% - 97%)    T(C): 37.2 (03-14-19 @ 11:28), Max: 37.2 (03-14-19 @ 11:28)  HR: 59 (03-14-19 @ 11:28) (59 - 59)  BP: 137/100 (03-14-19 @ 11:28) (137/100 - 137/100)  RR: 18 (03-14-19 @ 11:28) (18 - 18)  SpO2: 97% (03-14-19 @ 11:28) (97% - 97%)    PHYSICAL EXAM:  GENERAL: NAD, well-developed, well nourished, looks stated age  HEAD:  Atraumatic, Normocephalic  EYES: EOMI, PERRLA, conjunctiva and sclera clear  NECK: Supple, No JVD, no bruits, no masses, no thyroid enlargement  ENMT: No tonsillar erythema, exudated or enlargement, moist mucous membranes  CHEST/LUNG: Clear to auscultation bilaterally; No rales, rhonchi or wheezing, no dullness to percussion  HEART: S1,S2 Regular rate and rhythm; No murmurs, rubs, or gallops  ABDOMEN: Soft, nontender, nondistended, no rebound tenderness; No palpable masses, no hernias, no organomegaly; Bowel sounds present and normoactive  EXTREMITIES:  2+ peripheral pulses bilaterally and symmetrically, no clubbing, cyanosis, or edema  LYMPH: No lymphadenopathy  PSYCH: AAOx1, normal mood and affect  NEUROLOGY: non-focal, muscle strength 5/5 all extremities, DTRs 2+ symmetrically  SKIN: No rashes or lesions

## 2019-03-14 NOTE — H&P ADULT - NSICDXPASTMEDICALHX_GEN_ALL_CORE_FT
PAST MEDICAL HISTORY:  Brain aneurysm     Breast cancer in female     Dementia     Depression, unspecified depression type     Hyperlipidemia, unspecified hyperlipidemia type     Seizures

## 2019-03-14 NOTE — ED PROVIDER NOTE - CLINICAL SUMMARY MEDICAL DECISION MAKING FREE TEXT BOX
WDL
68y female above PMH presents for progressive cognitive decline as well as persistent delusions as above, on exam patient pleasant, engaging, AAO x 2 and jokes with examiner, vital signs appreciated (refuses rectal temp) head nc/at, perrla, neck supple cor rrr lungs cta abd snt/nd neuro nonfocal, labs and studies reviewed, will admit for placement
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
## 2019-03-14 NOTE — H&P ADULT - ASSESSMENT
67 yo female with dementia admitted with change in MS and aggressive behavior.                         D/W Dr Stanford

## 2019-03-14 NOTE — ED BEHAVIORAL HEALTH ASSESSMENT NOTE - RISK ASSESSMENT
Pt unable to care for self. Pt disoriented and therefore unable to make medical decisions on her own behalf. Does not have capacity to refuse care if felt to be necessary by primary treatment team.

## 2019-03-15 LAB
ANION GAP SERPL CALC-SCNC: 13 MMOL/L — SIGNIFICANT CHANGE UP (ref 7–14)
BASOPHILS # BLD AUTO: 0.03 K/UL — SIGNIFICANT CHANGE UP (ref 0–0.2)
BASOPHILS NFR BLD AUTO: 0.5 % — SIGNIFICANT CHANGE UP (ref 0–1)
BUN SERPL-MCNC: 17 MG/DL — SIGNIFICANT CHANGE UP (ref 10–20)
CALCIUM SERPL-MCNC: 8.8 MG/DL — SIGNIFICANT CHANGE UP (ref 8.5–10.1)
CHLORIDE SERPL-SCNC: 101 MMOL/L — SIGNIFICANT CHANGE UP (ref 98–110)
CO2 SERPL-SCNC: 28 MMOL/L — SIGNIFICANT CHANGE UP (ref 17–32)
CREAT SERPL-MCNC: 0.9 MG/DL — SIGNIFICANT CHANGE UP (ref 0.7–1.5)
EOSINOPHIL # BLD AUTO: 0.27 K/UL — SIGNIFICANT CHANGE UP (ref 0–0.7)
EOSINOPHIL NFR BLD AUTO: 4.3 % — SIGNIFICANT CHANGE UP (ref 0–8)
GLUCOSE SERPL-MCNC: 197 MG/DL — HIGH (ref 70–99)
HCT VFR BLD CALC: 29.6 % — LOW (ref 37–47)
HCV AB S/CO SERPL IA: 0.16 S/CO — SIGNIFICANT CHANGE UP (ref 0–0.79)
HCV AB SERPL-IMP: SIGNIFICANT CHANGE UP
HGB BLD-MCNC: 8.5 G/DL — LOW (ref 12–16)
IMM GRANULOCYTES NFR BLD AUTO: 0.6 % — HIGH (ref 0.1–0.3)
LYMPHOCYTES # BLD AUTO: 1.56 K/UL — SIGNIFICANT CHANGE UP (ref 1.2–3.4)
LYMPHOCYTES # BLD AUTO: 25.1 % — SIGNIFICANT CHANGE UP (ref 20.5–51.1)
MAGNESIUM SERPL-MCNC: 1.6 MG/DL — LOW (ref 1.8–2.4)
MCHC RBC-ENTMCNC: 22 PG — LOW (ref 27–31)
MCHC RBC-ENTMCNC: 28.7 G/DL — LOW (ref 32–37)
MCV RBC AUTO: 76.7 FL — LOW (ref 81–99)
MONOCYTES # BLD AUTO: 0.45 K/UL — SIGNIFICANT CHANGE UP (ref 0.1–0.6)
MONOCYTES NFR BLD AUTO: 7.2 % — SIGNIFICANT CHANGE UP (ref 1.7–9.3)
NEUTROPHILS # BLD AUTO: 3.87 K/UL — SIGNIFICANT CHANGE UP (ref 1.4–6.5)
NEUTROPHILS NFR BLD AUTO: 62.3 % — SIGNIFICANT CHANGE UP (ref 42.2–75.2)
NRBC # BLD: 0 /100 WBCS — SIGNIFICANT CHANGE UP (ref 0–0)
PHOSPHATE SERPL-MCNC: 4 MG/DL — SIGNIFICANT CHANGE UP (ref 2.1–4.9)
PLATELET # BLD AUTO: 175 K/UL — SIGNIFICANT CHANGE UP (ref 130–400)
POTASSIUM SERPL-MCNC: 3.9 MMOL/L — SIGNIFICANT CHANGE UP (ref 3.5–5)
POTASSIUM SERPL-SCNC: 3.9 MMOL/L — SIGNIFICANT CHANGE UP (ref 3.5–5)
RBC # BLD: 3.86 M/UL — LOW (ref 4.2–5.4)
RBC # FLD: 18.6 % — HIGH (ref 11.5–14.5)
SODIUM SERPL-SCNC: 142 MMOL/L — SIGNIFICANT CHANGE UP (ref 135–146)
WBC # BLD: 6.22 K/UL — SIGNIFICANT CHANGE UP (ref 4.8–10.8)
WBC # FLD AUTO: 6.22 K/UL — SIGNIFICANT CHANGE UP (ref 4.8–10.8)

## 2019-03-15 RX ADMIN — MEMANTINE HYDROCHLORIDE 10 MILLIGRAM(S): 10 TABLET ORAL at 17:13

## 2019-03-15 RX ADMIN — SIMVASTATIN 20 MILLIGRAM(S): 20 TABLET, FILM COATED ORAL at 22:23

## 2019-03-15 RX ADMIN — Medication 100 MILLIGRAM(S): at 17:13

## 2019-03-15 RX ADMIN — Medication 40 MILLIGRAM(S): at 05:55

## 2019-03-15 RX ADMIN — PANTOPRAZOLE SODIUM 40 MILLIGRAM(S): 20 TABLET, DELAYED RELEASE ORAL at 17:13

## 2019-03-15 RX ADMIN — PANTOPRAZOLE SODIUM 40 MILLIGRAM(S): 20 TABLET, DELAYED RELEASE ORAL at 06:00

## 2019-03-15 RX ADMIN — ESCITALOPRAM OXALATE 10 MILLIGRAM(S): 10 TABLET, FILM COATED ORAL at 11:39

## 2019-03-15 RX ADMIN — Medication 100 MILLIGRAM(S): at 05:55

## 2019-03-15 RX ADMIN — QUETIAPINE FUMARATE 25 MILLIGRAM(S): 200 TABLET, FILM COATED ORAL at 22:23

## 2019-03-15 RX ADMIN — LAMOTRIGINE 100 MILLIGRAM(S): 25 TABLET, ORALLY DISINTEGRATING ORAL at 17:13

## 2019-03-15 RX ADMIN — HEPARIN SODIUM 5000 UNIT(S): 5000 INJECTION INTRAVENOUS; SUBCUTANEOUS at 22:22

## 2019-03-15 RX ADMIN — LAMOTRIGINE 100 MILLIGRAM(S): 25 TABLET, ORALLY DISINTEGRATING ORAL at 05:55

## 2019-03-15 RX ADMIN — MEMANTINE HYDROCHLORIDE 10 MILLIGRAM(S): 10 TABLET ORAL at 05:55

## 2019-03-15 NOTE — PHYSICAL THERAPY INITIAL EVALUATION ADULT - SITTING BALANCE: STATIC
normal balance Home Suture Removal Text: Patient was provided a home suture removal kit and will remove their sutures at home.  If they have any questions or difficulties they will call the office.

## 2019-03-15 NOTE — PROGRESS NOTE ADULT - ASSESSMENT
dementia secondary to medical condition  depressive disorder nos    maintain lexapro 10 mg po daily  seroquel 25 mg po q 12  case management for after care plan.

## 2019-03-15 NOTE — PHYSICAL THERAPY INITIAL EVALUATION ADULT - DISCHARGE DISPOSITION, PT EVAL
no skilled PT needs/Patient is independent with transfers and gait without device.  Patient observed to be ambulating on unit with staff throughout day.

## 2019-03-15 NOTE — PHYSICAL THERAPY INITIAL EVALUATION ADULT - GENERAL OBSERVATIONS, REHAB EVAL
15:34 - 15:49.  Chart reviewed. Patient available to be seen for physical therapy. Patient encountered already out of bed in chair, near nurse's station.  Patient would like to walk with PT now.
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
## 2019-03-16 RX ORDER — QUETIAPINE FUMARATE 200 MG/1
25 TABLET, FILM COATED ORAL
Qty: 0 | Refills: 0 | Status: DISCONTINUED | OUTPATIENT
Start: 2019-03-16 | End: 2019-03-21

## 2019-03-16 RX ORDER — ACETAMINOPHEN 500 MG
650 TABLET ORAL EVERY 6 HOURS
Qty: 0 | Refills: 0 | Status: COMPLETED | OUTPATIENT
Start: 2019-03-16 | End: 2019-03-17

## 2019-03-16 RX ADMIN — MEMANTINE HYDROCHLORIDE 10 MILLIGRAM(S): 10 TABLET ORAL at 17:06

## 2019-03-16 RX ADMIN — MEMANTINE HYDROCHLORIDE 10 MILLIGRAM(S): 10 TABLET ORAL at 06:24

## 2019-03-16 RX ADMIN — PANTOPRAZOLE SODIUM 40 MILLIGRAM(S): 20 TABLET, DELAYED RELEASE ORAL at 06:25

## 2019-03-16 RX ADMIN — HEPARIN SODIUM 5000 UNIT(S): 5000 INJECTION INTRAVENOUS; SUBCUTANEOUS at 13:37

## 2019-03-16 RX ADMIN — Medication 100 MILLIGRAM(S): at 06:25

## 2019-03-16 RX ADMIN — QUETIAPINE FUMARATE 25 MILLIGRAM(S): 200 TABLET, FILM COATED ORAL at 17:06

## 2019-03-16 RX ADMIN — LAMOTRIGINE 100 MILLIGRAM(S): 25 TABLET, ORALLY DISINTEGRATING ORAL at 17:06

## 2019-03-16 RX ADMIN — HEPARIN SODIUM 5000 UNIT(S): 5000 INJECTION INTRAVENOUS; SUBCUTANEOUS at 21:23

## 2019-03-16 RX ADMIN — Medication 100 MILLIGRAM(S): at 17:06

## 2019-03-16 RX ADMIN — Medication 325 MILLIGRAM(S): at 08:39

## 2019-03-16 RX ADMIN — Medication 40 MILLIGRAM(S): at 06:24

## 2019-03-16 RX ADMIN — PANTOPRAZOLE SODIUM 40 MILLIGRAM(S): 20 TABLET, DELAYED RELEASE ORAL at 17:06

## 2019-03-16 RX ADMIN — LAMOTRIGINE 100 MILLIGRAM(S): 25 TABLET, ORALLY DISINTEGRATING ORAL at 06:27

## 2019-03-16 RX ADMIN — HEPARIN SODIUM 5000 UNIT(S): 5000 INJECTION INTRAVENOUS; SUBCUTANEOUS at 06:27

## 2019-03-16 RX ADMIN — SIMVASTATIN 20 MILLIGRAM(S): 20 TABLET, FILM COATED ORAL at 21:23

## 2019-03-16 RX ADMIN — ESCITALOPRAM OXALATE 10 MILLIGRAM(S): 10 TABLET, FILM COATED ORAL at 11:04

## 2019-03-16 NOTE — PROGRESS NOTE ADULT - SUBJECTIVE AND OBJECTIVE BOX
SUBJECTIVE:    Patient is a 68y old Female who presents with a chief complaint of change in ms, aggressive behavior (15 Mar 2019 10:30)    Currently admitted to medicine with the primary diagnosis of Change in mental status     Today is hospital day 2d. This morning she is resting comfortably in bed and reports no new issues or overnight events.     PAST MEDICAL & SURGICAL HISTORY  Brain aneurysm  Seizures  Depression, unspecified depression type  Hyperlipidemia, unspecified hyperlipidemia type  Dementia  Breast cancer in female  H/O abdominal hysterectomy  H/O mastectomy, left    SOCIAL HISTORY:  Negative for smoking/alcohol/drug use.     ALLERGIES:  No Known Allergies    MEDICATIONS:  STANDING MEDICATIONS  docusate sodium 100 milliGRAM(s) Oral two times a day  escitalopram 10 milliGRAM(s) Oral daily  ferrous    sulfate 325 milliGRAM(s) Oral <User Schedule>  furosemide    Tablet 40 milliGRAM(s) Oral daily  heparin  Injectable 5000 Unit(s) SubCutaneous every 8 hours  lamoTRIgine 100 milliGRAM(s) Oral two times a day  memantine 10 milliGRAM(s) Oral two times a day  pantoprazole    Tablet 40 milliGRAM(s) Oral two times a day  QUEtiapine 25 milliGRAM(s) Oral at bedtime  simvastatin 20 milliGRAM(s) Oral at bedtime    PRN MEDICATIONS  acetaminophen   Tablet .. 650 milliGRAM(s) Oral every 6 hours PRN  senna 2 Tablet(s) Oral at bedtime PRN    VITALS:   T(F): 96.8  HR: 60  BP: 110/53  RR: 18  SpO2: --    LABS:                        8.5    6.22  )-----------( 175      ( 15 Mar 2019 08:19 )             29.6     03-15    142  |  101  |  17  ----------------------------<  197<H>  3.9   |  28  |  0.9    Ca    8.8      15 Mar 2019 08:19  Phos  4.0     03-15  Mg     1.6     03-15    TPro  7.1  /  Alb  3.9  /  TBili  0.3  /  DBili  x   /  AST  48<H>  /  ALT  26  /  AlkPhos  87  03-14      Urinalysis Basic - ( 14 Mar 2019 13:15 )    Color: Yellow / Appearance: Clear / S.020 / pH: x  Gluc: x / Ketone: Negative  / Bili: Negative / Urobili: 0.2 mg/dL   Blood: x / Protein: Negative mg/dL / Nitrite: Negative   Leuk Esterase: Negative / RBC: x / WBC x   Sq Epi: x / Non Sq Epi: x / Bacteria: x                RADIOLOGY:    PHYSICAL EXAM:  GEN: No acute distress  LUNGS: Clear to auscultation bilaterally   HEART: Regular  ABD: Soft, non-tender, non-distended.  EXT: NC/NC/NE/2+PP/DIAMOND/Skin Intact.   NEURO: AAOX3    Intravenous access:   NG tube:   Uhff Catheter:

## 2019-03-16 NOTE — PROGRESS NOTE ADULT - ASSESSMENT
dementia secondary to medical condition  depressive disorder nos  plan------------------------------------------------------------------------------------------      maintain Lexapro 10 mg po daily  Seroquel 25 mg po q 12

## 2019-03-17 RX ADMIN — Medication 100 MILLIGRAM(S): at 05:06

## 2019-03-17 RX ADMIN — PANTOPRAZOLE SODIUM 40 MILLIGRAM(S): 20 TABLET, DELAYED RELEASE ORAL at 17:19

## 2019-03-17 RX ADMIN — Medication 40 MILLIGRAM(S): at 05:06

## 2019-03-17 RX ADMIN — PANTOPRAZOLE SODIUM 40 MILLIGRAM(S): 20 TABLET, DELAYED RELEASE ORAL at 05:06

## 2019-03-17 RX ADMIN — LAMOTRIGINE 100 MILLIGRAM(S): 25 TABLET, ORALLY DISINTEGRATING ORAL at 17:19

## 2019-03-17 RX ADMIN — Medication 100 MILLIGRAM(S): at 17:19

## 2019-03-17 RX ADMIN — Medication 650 MILLIGRAM(S): at 05:06

## 2019-03-17 RX ADMIN — Medication 650 MILLIGRAM(S): at 05:07

## 2019-03-17 RX ADMIN — MEMANTINE HYDROCHLORIDE 10 MILLIGRAM(S): 10 TABLET ORAL at 17:19

## 2019-03-17 RX ADMIN — QUETIAPINE FUMARATE 25 MILLIGRAM(S): 200 TABLET, FILM COATED ORAL at 05:06

## 2019-03-17 RX ADMIN — HEPARIN SODIUM 5000 UNIT(S): 5000 INJECTION INTRAVENOUS; SUBCUTANEOUS at 14:32

## 2019-03-17 RX ADMIN — ESCITALOPRAM OXALATE 10 MILLIGRAM(S): 10 TABLET, FILM COATED ORAL at 11:28

## 2019-03-17 RX ADMIN — HEPARIN SODIUM 5000 UNIT(S): 5000 INJECTION INTRAVENOUS; SUBCUTANEOUS at 21:46

## 2019-03-17 RX ADMIN — LAMOTRIGINE 100 MILLIGRAM(S): 25 TABLET, ORALLY DISINTEGRATING ORAL at 05:06

## 2019-03-17 RX ADMIN — HEPARIN SODIUM 5000 UNIT(S): 5000 INJECTION INTRAVENOUS; SUBCUTANEOUS at 05:07

## 2019-03-17 RX ADMIN — QUETIAPINE FUMARATE 25 MILLIGRAM(S): 200 TABLET, FILM COATED ORAL at 17:19

## 2019-03-17 RX ADMIN — MEMANTINE HYDROCHLORIDE 10 MILLIGRAM(S): 10 TABLET ORAL at 05:06

## 2019-03-17 RX ADMIN — SIMVASTATIN 20 MILLIGRAM(S): 20 TABLET, FILM COATED ORAL at 21:46

## 2019-03-17 NOTE — PROGRESS NOTE ADULT - SUBJECTIVE AND OBJECTIVE BOX
SUBJECTIVE:    Patient is a 68y old Female who presents with a chief complaint of change in ms, aggressive behavior (16 Mar 2019 09:20)    Currently admitted to medicine with the primary diagnosis of Change in mental status     Today is hospital day 3d. This morning she is resting comfortably in bed and reports no new issues or overnight events.     PAST MEDICAL & SURGICAL HISTORY  Brain aneurysm  Seizures  Depression, unspecified depression type  Hyperlipidemia, unspecified hyperlipidemia type  Dementia  Breast cancer in female  H/O abdominal hysterectomy  H/O mastectomy, left    SOCIAL HISTORY:  Negative for smoking/alcohol/drug use.     ALLERGIES:  No Known Allergies    MEDICATIONS:  STANDING MEDICATIONS  docusate sodium 100 milliGRAM(s) Oral two times a day  escitalopram 10 milliGRAM(s) Oral daily  ferrous    sulfate 325 milliGRAM(s) Oral <User Schedule>  furosemide    Tablet 40 milliGRAM(s) Oral daily  heparin  Injectable 5000 Unit(s) SubCutaneous every 8 hours  lamoTRIgine 100 milliGRAM(s) Oral two times a day  memantine 10 milliGRAM(s) Oral two times a day  pantoprazole    Tablet 40 milliGRAM(s) Oral two times a day  QUEtiapine 25 milliGRAM(s) Oral two times a day  simvastatin 20 milliGRAM(s) Oral at bedtime    PRN MEDICATIONS  senna 2 Tablet(s) Oral at bedtime PRN    VITALS:   T(F): 98.2  HR: 76  BP: 120/61  RR: 16  SpO2: --    LABS:                        RADIOLOGY:    PHYSICAL EXAM:  GEN: No acute distress  LUNGS: Clear to auscultation bilaterally   HEART: Regular  ABD: Soft, non-tender, non-distended.  EXT: NC/NC/NE/2+PP/DIAMOND/Skin Intact.   NEURO: AAOX3    Intravenous access:   NG tube:   Huff Catheter:

## 2019-03-18 RX ADMIN — QUETIAPINE FUMARATE 25 MILLIGRAM(S): 200 TABLET, FILM COATED ORAL at 06:06

## 2019-03-18 RX ADMIN — Medication 100 MILLIGRAM(S): at 17:39

## 2019-03-18 RX ADMIN — Medication 325 MILLIGRAM(S): at 08:30

## 2019-03-18 RX ADMIN — MEMANTINE HYDROCHLORIDE 10 MILLIGRAM(S): 10 TABLET ORAL at 06:06

## 2019-03-18 RX ADMIN — PANTOPRAZOLE SODIUM 40 MILLIGRAM(S): 20 TABLET, DELAYED RELEASE ORAL at 17:39

## 2019-03-18 RX ADMIN — LAMOTRIGINE 100 MILLIGRAM(S): 25 TABLET, ORALLY DISINTEGRATING ORAL at 17:39

## 2019-03-18 RX ADMIN — Medication 100 MILLIGRAM(S): at 06:06

## 2019-03-18 RX ADMIN — Medication 40 MILLIGRAM(S): at 06:06

## 2019-03-18 RX ADMIN — QUETIAPINE FUMARATE 25 MILLIGRAM(S): 200 TABLET, FILM COATED ORAL at 17:39

## 2019-03-18 RX ADMIN — HEPARIN SODIUM 5000 UNIT(S): 5000 INJECTION INTRAVENOUS; SUBCUTANEOUS at 06:06

## 2019-03-18 RX ADMIN — HEPARIN SODIUM 5000 UNIT(S): 5000 INJECTION INTRAVENOUS; SUBCUTANEOUS at 14:47

## 2019-03-18 RX ADMIN — MEMANTINE HYDROCHLORIDE 10 MILLIGRAM(S): 10 TABLET ORAL at 17:39

## 2019-03-18 RX ADMIN — LAMOTRIGINE 100 MILLIGRAM(S): 25 TABLET, ORALLY DISINTEGRATING ORAL at 06:06

## 2019-03-18 RX ADMIN — ESCITALOPRAM OXALATE 10 MILLIGRAM(S): 10 TABLET, FILM COATED ORAL at 12:45

## 2019-03-18 RX ADMIN — PANTOPRAZOLE SODIUM 40 MILLIGRAM(S): 20 TABLET, DELAYED RELEASE ORAL at 06:06

## 2019-03-18 RX ADMIN — SIMVASTATIN 20 MILLIGRAM(S): 20 TABLET, FILM COATED ORAL at 21:08

## 2019-03-18 NOTE — PROGRESS NOTE ADULT - SUBJECTIVE AND OBJECTIVE BOX
SUBJECTIVE:    Patient is a 68y old Female who presents with a chief complaint of change in ms, aggressive behavior (17 Mar 2019 10:27)    Currently admitted to medicine with the primary diagnosis of Change in mental status     Today is hospital day 4d. This morning she is resting comfortably in bed and reports no new issues or overnight events.     PAST MEDICAL & SURGICAL HISTORY  Brain aneurysm  Seizures  Depression, unspecified depression type  Hyperlipidemia, unspecified hyperlipidemia type  Dementia  Breast cancer in female  H/O abdominal hysterectomy  H/O mastectomy, left    SOCIAL HISTORY:  Negative for smoking/alcohol/drug use.     ALLERGIES:  No Known Allergies    MEDICATIONS:  STANDING MEDICATIONS  docusate sodium 100 milliGRAM(s) Oral two times a day  escitalopram 10 milliGRAM(s) Oral daily  ferrous    sulfate 325 milliGRAM(s) Oral <User Schedule>  furosemide    Tablet 40 milliGRAM(s) Oral daily  heparin  Injectable 5000 Unit(s) SubCutaneous every 8 hours  lamoTRIgine 100 milliGRAM(s) Oral two times a day  memantine 10 milliGRAM(s) Oral two times a day  pantoprazole    Tablet 40 milliGRAM(s) Oral two times a day  QUEtiapine 25 milliGRAM(s) Oral two times a day  simvastatin 20 milliGRAM(s) Oral at bedtime    PRN MEDICATIONS  senna 2 Tablet(s) Oral at bedtime PRN    VITALS:   T(F): 96.1  HR: 98  BP: 145/70  RR: 18  SpO2: --    LABS:                        RADIOLOGY:    PHYSICAL EXAM:  GEN: No acute distress  LUNGS: Clear to auscultation bilaterally   HEART: Regular  ABD: Soft, non-tender, non-distended.  EXT: NC/NC/NE/2+PP/DIAMOND/Skin Intact.   NEURO: AAOX3    Intravenous access:   NG tube:   Huff Catheter:

## 2019-03-19 RX ADMIN — Medication 100 MILLIGRAM(S): at 05:58

## 2019-03-19 RX ADMIN — LAMOTRIGINE 100 MILLIGRAM(S): 25 TABLET, ORALLY DISINTEGRATING ORAL at 05:59

## 2019-03-19 RX ADMIN — PANTOPRAZOLE SODIUM 40 MILLIGRAM(S): 20 TABLET, DELAYED RELEASE ORAL at 17:14

## 2019-03-19 RX ADMIN — LAMOTRIGINE 100 MILLIGRAM(S): 25 TABLET, ORALLY DISINTEGRATING ORAL at 17:14

## 2019-03-19 RX ADMIN — SIMVASTATIN 20 MILLIGRAM(S): 20 TABLET, FILM COATED ORAL at 22:11

## 2019-03-19 RX ADMIN — ESCITALOPRAM OXALATE 10 MILLIGRAM(S): 10 TABLET, FILM COATED ORAL at 11:51

## 2019-03-19 RX ADMIN — MEMANTINE HYDROCHLORIDE 10 MILLIGRAM(S): 10 TABLET ORAL at 05:58

## 2019-03-19 RX ADMIN — PANTOPRAZOLE SODIUM 40 MILLIGRAM(S): 20 TABLET, DELAYED RELEASE ORAL at 06:00

## 2019-03-19 RX ADMIN — Medication 40 MILLIGRAM(S): at 05:58

## 2019-03-19 RX ADMIN — MEMANTINE HYDROCHLORIDE 10 MILLIGRAM(S): 10 TABLET ORAL at 17:14

## 2019-03-19 RX ADMIN — Medication 100 MILLIGRAM(S): at 17:14

## 2019-03-19 RX ADMIN — QUETIAPINE FUMARATE 25 MILLIGRAM(S): 200 TABLET, FILM COATED ORAL at 17:14

## 2019-03-19 RX ADMIN — QUETIAPINE FUMARATE 25 MILLIGRAM(S): 200 TABLET, FILM COATED ORAL at 05:58

## 2019-03-19 NOTE — PROGRESS NOTE ADULT - ASSESSMENT
dementia secondary to medical condition  depressive disorder nos  psych problem   plan---------------------------      maintain Lexapro 10 mg po daily  Seroquel 25 mg po q 12  case management for after care plan  await placement --

## 2019-03-19 NOTE — PROGRESS NOTE ADULT - SUBJECTIVE AND OBJECTIVE BOX
SUBJECTIVE:    Patient is a 68y old Female who presents with a chief complaint of change in ms, aggressive behavior (18 Mar 2019 10:01)    Currently admitted to medicine with the primary diagnosis of Change in mental status     Today is hospital day 5d. This morning she is resting comfortably in bed and reports no new issues or overnight events.     PAST MEDICAL & SURGICAL HISTORY  Brain aneurysm  Seizures  Depression, unspecified depression type  Hyperlipidemia, unspecified hyperlipidemia type  Dementia  Breast cancer in female  H/O abdominal hysterectomy  H/O mastectomy, left    SOCIAL HISTORY:  Negative for smoking/alcohol/drug use.     ALLERGIES:  No Known Allergies    MEDICATIONS:  STANDING MEDICATIONS  docusate sodium 100 milliGRAM(s) Oral two times a day  escitalopram 10 milliGRAM(s) Oral daily  ferrous    sulfate 325 milliGRAM(s) Oral <User Schedule>  furosemide    Tablet 40 milliGRAM(s) Oral daily  heparin  Injectable 5000 Unit(s) SubCutaneous every 8 hours  lamoTRIgine 100 milliGRAM(s) Oral two times a day  memantine 10 milliGRAM(s) Oral two times a day  pantoprazole    Tablet 40 milliGRAM(s) Oral two times a day  QUEtiapine 25 milliGRAM(s) Oral two times a day  simvastatin 20 milliGRAM(s) Oral at bedtime    PRN MEDICATIONS  senna 2 Tablet(s) Oral at bedtime PRN    VITALS:   T(F): 97.2  HR: 76  BP: 121/66  RR: 18  SpO2: --    LABS:                        RADIOLOGY:    PHYSICAL EXAM:  GEN: No acute distress  LUNGS: Clear to auscultation bilaterally   HEART: Regular  ABD: Soft, non-tender, non-distended.  EXT: NC/NC/NE/2+PP/DIAMOND/Skin Intact.   NEURO: AAOX3    Intravenous access:   NG tube:   Huff Catheter:

## 2019-03-20 RX ADMIN — MEMANTINE HYDROCHLORIDE 10 MILLIGRAM(S): 10 TABLET ORAL at 17:36

## 2019-03-20 RX ADMIN — QUETIAPINE FUMARATE 25 MILLIGRAM(S): 200 TABLET, FILM COATED ORAL at 17:36

## 2019-03-20 RX ADMIN — MEMANTINE HYDROCHLORIDE 10 MILLIGRAM(S): 10 TABLET ORAL at 05:28

## 2019-03-20 RX ADMIN — QUETIAPINE FUMARATE 25 MILLIGRAM(S): 200 TABLET, FILM COATED ORAL at 05:28

## 2019-03-20 RX ADMIN — Medication 100 MILLIGRAM(S): at 17:36

## 2019-03-20 RX ADMIN — Medication 325 MILLIGRAM(S): at 09:41

## 2019-03-20 RX ADMIN — PANTOPRAZOLE SODIUM 40 MILLIGRAM(S): 20 TABLET, DELAYED RELEASE ORAL at 05:28

## 2019-03-20 RX ADMIN — Medication 100 MILLIGRAM(S): at 05:28

## 2019-03-20 RX ADMIN — LAMOTRIGINE 100 MILLIGRAM(S): 25 TABLET, ORALLY DISINTEGRATING ORAL at 17:36

## 2019-03-20 RX ADMIN — PANTOPRAZOLE SODIUM 40 MILLIGRAM(S): 20 TABLET, DELAYED RELEASE ORAL at 17:36

## 2019-03-20 RX ADMIN — LAMOTRIGINE 100 MILLIGRAM(S): 25 TABLET, ORALLY DISINTEGRATING ORAL at 05:28

## 2019-03-20 RX ADMIN — Medication 40 MILLIGRAM(S): at 05:28

## 2019-03-20 RX ADMIN — ESCITALOPRAM OXALATE 10 MILLIGRAM(S): 10 TABLET, FILM COATED ORAL at 11:22

## 2019-03-20 RX ADMIN — SIMVASTATIN 20 MILLIGRAM(S): 20 TABLET, FILM COATED ORAL at 22:07

## 2019-03-20 NOTE — PROGRESS NOTE ADULT - ASSESSMENT
r/o encephalopathy vs delirium  plan-------------------------------------------------------------------------------------------------------------  medical stabilization  safety 1: 1     need placement psych related issues     Seroquel  Lexapro

## 2019-03-20 NOTE — PROGRESS NOTE ADULT - REASON FOR ADMISSION
change in ms, aggressive behavior

## 2019-03-20 NOTE — PROGRESS NOTE ADULT - SUBJECTIVE AND OBJECTIVE BOX
SUBJECTIVE:    Patient is a 68y old Female who presents with a chief complaint of change in ms, aggressive behavior (19 Mar 2019 10:52)    Currently admitted to medicine with the primary diagnosis of Change in mental status     Today is hospital day 6d. This morning she is resting comfortably in bed and reports no new issues or overnight events.     PAST MEDICAL & SURGICAL HISTORY  Brain aneurysm  Seizures  Depression, unspecified depression type  Hyperlipidemia, unspecified hyperlipidemia type  Dementia  Breast cancer in female  H/O abdominal hysterectomy  H/O mastectomy, left    SOCIAL HISTORY:  Negative for smoking/alcohol/drug use.     ALLERGIES:  No Known Allergies    MEDICATIONS:  STANDING MEDICATIONS  docusate sodium 100 milliGRAM(s) Oral two times a day  escitalopram 10 milliGRAM(s) Oral daily  ferrous    sulfate 325 milliGRAM(s) Oral <User Schedule>  furosemide    Tablet 40 milliGRAM(s) Oral daily  heparin  Injectable 5000 Unit(s) SubCutaneous every 8 hours  lamoTRIgine 100 milliGRAM(s) Oral two times a day  memantine 10 milliGRAM(s) Oral two times a day  pantoprazole    Tablet 40 milliGRAM(s) Oral two times a day  QUEtiapine 25 milliGRAM(s) Oral two times a day  simvastatin 20 milliGRAM(s) Oral at bedtime    PRN MEDICATIONS  senna 2 Tablet(s) Oral at bedtime PRN    VITALS:   T(F): 97.5  HR: 58  BP: 106/56  RR: 16  SpO2: --    LABS:                        RADIOLOGY:    PHYSICAL EXAM:  GEN: No acute distress  LUNGS: Clear to auscultation bilaterally   HEART: Regular  ABD: Soft, non-tender, non-distended.  EXT: NC/NC/NE/2+PP/DIAMOND/Skin Intact.   NEURO: AAOX3    Intravenous access:   NG tube:   Huff Catheter:

## 2019-03-21 ENCOUNTER — TRANSCRIPTION ENCOUNTER (OUTPATIENT)
Age: 69
End: 2019-03-21

## 2019-03-21 VITALS — DIASTOLIC BLOOD PRESSURE: 62 MMHG | HEART RATE: 85 BPM | SYSTOLIC BLOOD PRESSURE: 126 MMHG | TEMPERATURE: 98 F

## 2019-03-21 RX ORDER — QUETIAPINE FUMARATE 200 MG/1
1 TABLET, FILM COATED ORAL
Qty: 60 | Refills: 0 | OUTPATIENT
Start: 2019-03-21

## 2019-03-21 RX ORDER — QUETIAPINE FUMARATE 200 MG/1
0 TABLET, FILM COATED ORAL
Qty: 0 | Refills: 0 | COMMUNITY

## 2019-03-21 RX ADMIN — Medication 100 MILLIGRAM(S): at 17:47

## 2019-03-21 RX ADMIN — MEMANTINE HYDROCHLORIDE 10 MILLIGRAM(S): 10 TABLET ORAL at 17:47

## 2019-03-21 RX ADMIN — ESCITALOPRAM OXALATE 10 MILLIGRAM(S): 10 TABLET, FILM COATED ORAL at 14:56

## 2019-03-21 RX ADMIN — LAMOTRIGINE 100 MILLIGRAM(S): 25 TABLET, ORALLY DISINTEGRATING ORAL at 05:38

## 2019-03-21 RX ADMIN — QUETIAPINE FUMARATE 25 MILLIGRAM(S): 200 TABLET, FILM COATED ORAL at 05:38

## 2019-03-21 RX ADMIN — PANTOPRAZOLE SODIUM 40 MILLIGRAM(S): 20 TABLET, DELAYED RELEASE ORAL at 05:38

## 2019-03-21 RX ADMIN — QUETIAPINE FUMARATE 25 MILLIGRAM(S): 200 TABLET, FILM COATED ORAL at 17:47

## 2019-03-21 RX ADMIN — Medication 100 MILLIGRAM(S): at 05:38

## 2019-03-21 RX ADMIN — LAMOTRIGINE 100 MILLIGRAM(S): 25 TABLET, ORALLY DISINTEGRATING ORAL at 17:47

## 2019-03-21 RX ADMIN — MEMANTINE HYDROCHLORIDE 10 MILLIGRAM(S): 10 TABLET ORAL at 05:38

## 2019-03-21 RX ADMIN — PANTOPRAZOLE SODIUM 40 MILLIGRAM(S): 20 TABLET, DELAYED RELEASE ORAL at 17:47

## 2019-03-21 NOTE — DISCHARGE NOTE PROVIDER - NSDCCPCAREPLAN_GEN_ALL_CORE_FT
PRINCIPAL DISCHARGE DIAGNOSIS  Diagnosis: Change in mental status  Assessment and Plan of Treatment: provide supportive care, conitnue medications, follow up with primary medical doctor in 1 week, call doctor if worsening confusion.

## 2019-03-21 NOTE — DISCHARGE NOTE NURSING/CASE MANAGEMENT/SOCIAL WORK - NSDCDPATPORTLINK_GEN_ALL_CORE
You can access the [facility] Patient Portal, offered by [facility], by registering with the following website: [email or web address]

## 2019-03-21 NOTE — DISCHARGE NOTE PROVIDER - CARE PROVIDER_API CALL
Catarino Velasquez (MD)  Internal Medicine  [address] [place] [address]  Phone: [phone]  Fax: [phone]  Follow Up Time:

## 2019-03-26 DIAGNOSIS — R41.82 ALTERED MENTAL STATUS, UNSPECIFIED: ICD-10-CM

## 2019-03-26 DIAGNOSIS — Z86.73 PERSONAL HISTORY OF TRANSIENT ISCHEMIC ATTACK (TIA), AND CEREBRAL INFARCTION WITHOUT RESIDUAL DEFICITS: ICD-10-CM

## 2019-03-26 DIAGNOSIS — Z90.12 ACQUIRED ABSENCE OF LEFT BREAST AND NIPPLE: ICD-10-CM

## 2019-03-26 DIAGNOSIS — F22 DELUSIONAL DISORDERS: ICD-10-CM

## 2019-03-26 DIAGNOSIS — Z85.3 PERSONAL HISTORY OF MALIGNANT NEOPLASM OF BREAST: ICD-10-CM

## 2019-03-26 DIAGNOSIS — G40.909 EPILEPSY, UNSPECIFIED, NOT INTRACTABLE, WITHOUT STATUS EPILEPTICUS: ICD-10-CM

## 2019-03-26 DIAGNOSIS — F32.89 OTHER SPECIFIED DEPRESSIVE EPISODES: ICD-10-CM

## 2019-03-26 DIAGNOSIS — E78.5 HYPERLIPIDEMIA, UNSPECIFIED: ICD-10-CM

## 2019-03-26 DIAGNOSIS — Z90.710 ACQUIRED ABSENCE OF BOTH CERVIX AND UTERUS: ICD-10-CM

## 2019-03-26 DIAGNOSIS — F03.91 UNSPECIFIED DEMENTIA WITH BEHAVIORAL DISTURBANCE: ICD-10-CM

## 2020-01-20 ENCOUNTER — INPATIENT (INPATIENT)
Facility: HOSPITAL | Age: 70
LOS: 7 days | Discharge: HOSPICE | End: 2020-01-28
Attending: HOSPITALIST | Admitting: HOSPITALIST
Payer: MEDICARE

## 2020-01-20 VITALS
SYSTOLIC BLOOD PRESSURE: 130 MMHG | DIASTOLIC BLOOD PRESSURE: 60 MMHG | WEIGHT: 229.94 LBS | HEART RATE: 101 BPM | RESPIRATION RATE: 20 BRPM | TEMPERATURE: 99 F | OXYGEN SATURATION: 98 %

## 2020-01-20 DIAGNOSIS — R56.9 UNSPECIFIED CONVULSIONS: ICD-10-CM

## 2020-01-20 DIAGNOSIS — D63.0 ANEMIA IN NEOPLASTIC DISEASE: ICD-10-CM

## 2020-01-20 DIAGNOSIS — Z98.890 OTHER SPECIFIED POSTPROCEDURAL STATES: Chronic | ICD-10-CM

## 2020-01-20 DIAGNOSIS — C50.919 MALIGNANT NEOPLASM OF UNSPECIFIED SITE OF UNSPECIFIED FEMALE BREAST: ICD-10-CM

## 2020-01-20 DIAGNOSIS — C26.9 MALIGNANT NEOPLASM OF ILL-DEFINED SITES WITHIN THE DIGESTIVE SYSTEM: ICD-10-CM

## 2020-01-20 DIAGNOSIS — E78.5 HYPERLIPIDEMIA, UNSPECIFIED: ICD-10-CM

## 2020-01-20 DIAGNOSIS — Z85.3 PERSONAL HISTORY OF MALIGNANT NEOPLASM OF BREAST: ICD-10-CM

## 2020-01-20 DIAGNOSIS — D64.9 ANEMIA, UNSPECIFIED: ICD-10-CM

## 2020-01-20 DIAGNOSIS — I67.1 CEREBRAL ANEURYSM, NONRUPTURED: ICD-10-CM

## 2020-01-20 DIAGNOSIS — F32.9 MAJOR DEPRESSIVE DISORDER, SINGLE EPISODE, UNSPECIFIED: ICD-10-CM

## 2020-01-20 DIAGNOSIS — F03.90 UNSPECIFIED DEMENTIA WITHOUT BEHAVIORAL DISTURBANCE: ICD-10-CM

## 2020-01-20 LAB
ALBUMIN SERPL ELPH-MCNC: 3.8 G/DL — SIGNIFICANT CHANGE UP (ref 3.5–5.2)
ALP SERPL-CCNC: 76 U/L — SIGNIFICANT CHANGE UP (ref 30–115)
ALT FLD-CCNC: 10 U/L — SIGNIFICANT CHANGE UP (ref 0–41)
ANION GAP SERPL CALC-SCNC: 14 MMOL/L — SIGNIFICANT CHANGE UP (ref 7–14)
APTT BLD: 27.2 SEC — SIGNIFICANT CHANGE UP (ref 27–39.2)
AST SERPL-CCNC: 27 U/L — SIGNIFICANT CHANGE UP (ref 0–41)
BASOPHILS # BLD AUTO: 0 K/UL — SIGNIFICANT CHANGE UP (ref 0–0.2)
BASOPHILS NFR BLD AUTO: 0 % — SIGNIFICANT CHANGE UP (ref 0–1)
BILIRUB SERPL-MCNC: 0.5 MG/DL — SIGNIFICANT CHANGE UP (ref 0.2–1.2)
BLD GP AB SCN SERPL QL: SIGNIFICANT CHANGE UP
BUN SERPL-MCNC: 17 MG/DL — SIGNIFICANT CHANGE UP (ref 10–20)
CALCIUM SERPL-MCNC: 8.7 MG/DL — SIGNIFICANT CHANGE UP (ref 8.5–10.1)
CHLORIDE SERPL-SCNC: 98 MMOL/L — SIGNIFICANT CHANGE UP (ref 98–110)
CO2 SERPL-SCNC: 25 MMOL/L — SIGNIFICANT CHANGE UP (ref 17–32)
CREAT SERPL-MCNC: 0.9 MG/DL — SIGNIFICANT CHANGE UP (ref 0.7–1.5)
EOSINOPHIL # BLD AUTO: 0.17 K/UL — SIGNIFICANT CHANGE UP (ref 0–0.7)
EOSINOPHIL NFR BLD AUTO: 3 % — SIGNIFICANT CHANGE UP (ref 0–8)
GLUCOSE SERPL-MCNC: 238 MG/DL — HIGH (ref 70–99)
HCT VFR BLD CALC: 14 % — LOW (ref 37–47)
HCT VFR BLD CALC: 18.9 % — LOW (ref 37–47)
HGB BLD-MCNC: 3.7 G/DL — CRITICAL LOW (ref 12–16)
HGB BLD-MCNC: 5.6 G/DL — CRITICAL LOW (ref 12–16)
HYPOCHROMIA BLD QL: SIGNIFICANT CHANGE UP
INR BLD: 1.25 RATIO — SIGNIFICANT CHANGE UP (ref 0.65–1.3)
IRON SATN MFR SERPL: 22 UG/DL — LOW (ref 35–150)
IRON SATN MFR SERPL: 5 % — LOW (ref 15–50)
LYMPHOCYTES # BLD AUTO: 0.72 K/UL — LOW (ref 1.2–3.4)
LYMPHOCYTES # BLD AUTO: 13 % — LOW (ref 20.5–51.1)
MANUAL SMEAR VERIFICATION: SIGNIFICANT CHANGE UP
MCHC RBC-ENTMCNC: 19.8 PG — LOW (ref 27–31)
MCHC RBC-ENTMCNC: 22.7 PG — LOW (ref 27–31)
MCHC RBC-ENTMCNC: 26.4 G/DL — LOW (ref 32–37)
MCHC RBC-ENTMCNC: 29.6 G/DL — LOW (ref 32–37)
MCV RBC AUTO: 74.9 FL — LOW (ref 81–99)
MCV RBC AUTO: 76.5 FL — LOW (ref 81–99)
MONOCYTES # BLD AUTO: 0.39 K/UL — SIGNIFICANT CHANGE UP (ref 0.1–0.6)
MONOCYTES NFR BLD AUTO: 7 % — SIGNIFICANT CHANGE UP (ref 1.7–9.3)
NEUTROPHILS # BLD AUTO: 4.27 K/UL — SIGNIFICANT CHANGE UP (ref 1.4–6.5)
NEUTROPHILS NFR BLD AUTO: 77 % — HIGH (ref 42.2–75.2)
NRBC # BLD: 0 /100 WBCS — SIGNIFICANT CHANGE UP (ref 0–0)
NRBC # BLD: 0 /100 — SIGNIFICANT CHANGE UP (ref 0–0)
NRBC # BLD: SIGNIFICANT CHANGE UP /100 WBCS (ref 0–0)
PLAT MORPH BLD: NORMAL — SIGNIFICANT CHANGE UP
PLATELET # BLD AUTO: 166 K/UL — SIGNIFICANT CHANGE UP (ref 130–400)
PLATELET # BLD AUTO: 171 K/UL — SIGNIFICANT CHANGE UP (ref 130–400)
POTASSIUM SERPL-MCNC: 3.7 MMOL/L — SIGNIFICANT CHANGE UP (ref 3.5–5)
POTASSIUM SERPL-SCNC: 3.7 MMOL/L — SIGNIFICANT CHANGE UP (ref 3.5–5)
PROT SERPL-MCNC: 6.5 G/DL — SIGNIFICANT CHANGE UP (ref 6–8)
PROTHROM AB SERPL-ACNC: 14.3 SEC — HIGH (ref 9.95–12.87)
RBC # BLD: 1.85 M/UL — LOW (ref 4.2–5.4)
RBC # BLD: 1.87 M/UL — LOW (ref 4.2–5.4)
RBC # BLD: 2.47 M/UL — LOW (ref 4.2–5.4)
RBC # FLD: 18.3 % — HIGH (ref 11.5–14.5)
RBC # FLD: 19.3 % — HIGH (ref 11.5–14.5)
RBC BLD AUTO: ABNORMAL
RETICS #: 70.7 K/UL — SIGNIFICANT CHANGE UP (ref 25–125)
RETICS/RBC NFR: 3.8 % — HIGH (ref 0.5–1.5)
SODIUM SERPL-SCNC: 137 MMOL/L — SIGNIFICANT CHANGE UP (ref 135–146)
TIBC SERPL-MCNC: 450 UG/DL — HIGH (ref 220–430)
UIBC SERPL-MCNC: 428 UG/DL — HIGH (ref 110–370)
WBC # BLD: 5.55 K/UL — SIGNIFICANT CHANGE UP (ref 4.8–10.8)
WBC # BLD: 6.77 K/UL — SIGNIFICANT CHANGE UP (ref 4.8–10.8)
WBC # FLD AUTO: 5.55 K/UL — SIGNIFICANT CHANGE UP (ref 4.8–10.8)
WBC # FLD AUTO: 6.77 K/UL — SIGNIFICANT CHANGE UP (ref 4.8–10.8)

## 2020-01-20 PROCEDURE — 99291 CRITICAL CARE FIRST HOUR: CPT | Mod: GC

## 2020-01-20 PROCEDURE — 99223 1ST HOSP IP/OBS HIGH 75: CPT

## 2020-01-20 RX ORDER — SENNA PLUS 8.6 MG/1
2 TABLET ORAL AT BEDTIME
Refills: 0 | Status: DISCONTINUED | OUTPATIENT
Start: 2020-01-20 | End: 2020-01-28

## 2020-01-20 RX ORDER — ONDANSETRON 8 MG/1
4 TABLET, FILM COATED ORAL EVERY 6 HOURS
Refills: 0 | Status: DISCONTINUED | OUTPATIENT
Start: 2020-01-20 | End: 2020-01-28

## 2020-01-20 RX ORDER — LAMOTRIGINE 25 MG/1
100 TABLET, ORALLY DISINTEGRATING ORAL
Refills: 0 | Status: DISCONTINUED | OUTPATIENT
Start: 2020-01-20 | End: 2020-01-28

## 2020-01-20 RX ORDER — SIMVASTATIN 20 MG/1
20 TABLET, FILM COATED ORAL AT BEDTIME
Refills: 0 | Status: DISCONTINUED | OUTPATIENT
Start: 2020-01-20 | End: 2020-01-28

## 2020-01-20 RX ORDER — QUETIAPINE FUMARATE 200 MG/1
25 TABLET, FILM COATED ORAL
Refills: 0 | Status: DISCONTINUED | OUTPATIENT
Start: 2020-01-20 | End: 2020-01-28

## 2020-01-20 RX ORDER — MEMANTINE HYDROCHLORIDE 10 MG/1
10 TABLET ORAL
Refills: 0 | Status: DISCONTINUED | OUTPATIENT
Start: 2020-01-20 | End: 2020-01-28

## 2020-01-20 RX ORDER — CHLORHEXIDINE GLUCONATE 213 G/1000ML
1 SOLUTION TOPICAL
Refills: 0 | Status: DISCONTINUED | OUTPATIENT
Start: 2020-01-20 | End: 2020-01-28

## 2020-01-20 RX ORDER — ESCITALOPRAM OXALATE 10 MG/1
10 TABLET, FILM COATED ORAL DAILY
Refills: 0 | Status: DISCONTINUED | OUTPATIENT
Start: 2020-01-20 | End: 2020-01-28

## 2020-01-20 RX ORDER — PANTOPRAZOLE SODIUM 20 MG/1
8 TABLET, DELAYED RELEASE ORAL
Qty: 80 | Refills: 0 | Status: DISCONTINUED | OUTPATIENT
Start: 2020-01-20 | End: 2020-01-27

## 2020-01-20 RX ADMIN — QUETIAPINE FUMARATE 25 MILLIGRAM(S): 200 TABLET, FILM COATED ORAL at 17:43

## 2020-01-20 RX ADMIN — SIMVASTATIN 20 MILLIGRAM(S): 20 TABLET, FILM COATED ORAL at 21:12

## 2020-01-20 RX ADMIN — MEMANTINE HYDROCHLORIDE 10 MILLIGRAM(S): 10 TABLET ORAL at 17:44

## 2020-01-20 RX ADMIN — LAMOTRIGINE 100 MILLIGRAM(S): 25 TABLET, ORALLY DISINTEGRATING ORAL at 17:44

## 2020-01-20 RX ADMIN — ESCITALOPRAM OXALATE 10 MILLIGRAM(S): 10 TABLET, FILM COATED ORAL at 17:43

## 2020-01-20 RX ADMIN — PANTOPRAZOLE SODIUM 10 MG/HR: 20 TABLET, DELAYED RELEASE ORAL at 17:47

## 2020-01-20 NOTE — ED PROVIDER NOTE - ATTENDING CONTRIBUTION TO CARE
I was present for and supervised the key and critical aspects of the procedures performed during the care of the patient. Patient sent in for evaluation for possible anemia she is demented but at baseline status pale appearing with no chest pain or shortness of breath. she denies any abdominal pain or back pain.   On physical exam she is nc/at perrla eomi oropharynx place, cta b/l, rrr s1s2 noted abd-soft nt nd bs+ ext from with no edema   A/P- we obtained labs found to be significantly anemic transfusion initiated   I will admit for further workup at this time

## 2020-01-20 NOTE — CONSULT NOTE ADULT - SUBJECTIVE AND OBJECTIVE BOX
Chief complaint/Reason for consult: microcytic anemia no overt signs of GI bleeding     HPI:  A 68 yo F with PMHx of breast cancer, dementia, depression, seizures, h/o CVA x2 and Cerebral Aneurysm, anemia hgb 5.9 on 1/19 s/p  transfusion  at that time brought in for abnormal labs as per electronic medical records. No family members at bedside, Pt with dementia unable to provide history.  Pt does not answer  and keeps asking unrelated questions. Pt had EGD/colonoscopy on 1/2019 significant for duodenitis, non erosive gastritis and diverticulosis. (20 Jan 2020 14:44)    GI updates: 69yFemale pmh CVA       PAST MEDICAL & SURGICAL HISTORY:   Brain aneurysm  Seizures  Depression, unspecified depression type  Hyperlipidemia, unspecified hyperlipidemia type  Dementia  Breast cancer in female  H/O abdominal hysterectomy  H/O mastectomy, left        Family history:  FAMILY HISTORY:  No pertinent family history in first degree relatives    No GI cancers in first or second degree relatives    Social History: No smoking. No alcohol. No illegal drug use.    Allergies:     No Known Allergies      MEDICATIONS: Home Medications:  docusate sodium 100 mg oral capsule: 1 cap(s) orally 2 times a day (14 Mar 2019 11:28)  escitalopram 10 mg oral tablet: 1 tab(s) orally once a day (14 Mar 2019 11:28)  lamoTRIgine 100 mg oral tablet: 1 tab(s) orally 2 times a day (14 Mar 2019 11:28)  Lasix 40 mg oral tablet: 1 tab(s) orally once a day (14 Mar 2019 11:28)  memantine 10 mg oral tablet: 1 tab(s) orally 2 times a day (14 Mar 2019 11:28)  pantoprazole 40 mg oral delayed release tablet: 1 tab(s) orally 2 times a day (14 Mar 2019 11:28)  senna oral tablet: 2 tab(s) orally once a day (at bedtime), As Needed (14 Mar 2019 11:28)  simvastatin 20 mg oral tablet: 1 tab(s) orally once a day (at bedtime) (14 Mar 2019 11:28)      MEDICATIONS  (STANDING):  chlorhexidine 4% Liquid 1 Application(s) Topical <User Schedule>  escitalopram 10 milliGRAM(s) Oral daily  lamoTRIgine 100 milliGRAM(s) Oral two times a day  memantine 10 milliGRAM(s) Oral two times a day  pantoprazole Infusion 8 mG/Hr (10 mL/Hr) IV Continuous <Continuous>  QUEtiapine 25 milliGRAM(s) Oral two times a day  simvastatin 20 milliGRAM(s) Oral at bedtime    MEDICATIONS  (PRN):  bisacodyl 5 milliGRAM(s) Oral daily PRN Constipation  ondansetron Injectable 4 milliGRAM(s) IV Push every 6 hours PRN Nausea  senna 2 Tablet(s) Oral at bedtime PRN Constipation        REVIEW OF SYSTEMS  General:  No weight loss, fevers, or chills.  Eyes:  No reported pain or visual changes  ENT:  No sore throat or runny nose.  NECK: No stiffness or lymphadenopathy  CV:  No chest pain or palpitations.  Resp:  No shortness of breath, cough, wheezing or hemoptysis  GI:  No abdominal pain, nausea, vomiting, dysphagia, diarrhea or constipation. No rectal bleeding, melena, or hematemesis.  Neuro:  No tingling, numbness       VITALS:   T(F): 99.8 (01-20-20 @ 16:07), Max: 99.8 (01-20-20 @ 16:07)  HR: 81 (01-20-20 @ 16:07) (68 - 101)  BP: 138/56 (01-20-20 @ 16:07) (127/67 - 138/56)  RR: 16 (01-20-20 @ 16:07) (16 - 20)  SpO2: 99% (01-20-20 @ 15:43) (98% - 99%)    PHYSICAL EXAM:  GENERAL: AAOx2 to person and place not time,  HEAD:  Atraumatic, Normocephalic  EYES: conjunctiva and sclera clear  NECK: Supple, No thyromegaly   CHEST/LUNG: Clear to auscultation bilaterally; No wheeze, rhonchi, or rales  HEART: Regular rate and rhythm; normal S1, S2, No murmurs.  ABDOMEN: Soft, nontender, nondistended; Bowel sounds present, no abdominal bruit, masses, or hepatosplenomegaly  NEUROLOGY: No asterixis or tremor  SKIN: Intact, no jaundice  Rectal exam-patient refused         LABS:  01-20    137  |  98  |  17  ----------------------------<  238<H>  3.7   |  25  |  0.9    Ca    8.7      20 Jan 2020 12:30    TPro  6.5  /  Alb  3.8  /  TBili  0.5  /  DBili  x   /  AST  27  /  ALT  10  /  AlkPhos  76  01-20                          3.7    5.55  )-----------( 166      ( 20 Jan 2020 12:30 )             14.0     LIVER FUNCTIONS - ( 20 Jan 2020 12:30 )  Alb: 3.8 g/dL / Pro: 6.5 g/dL / ALK PHOS: 76 U/L / ALT: 10 U/L / AST: 27 U/L / GGT: x           PT/INR - ( 20 Jan 2020 12:30 )   PT: 14.30 sec;   INR: 1.25 ratio         PTT - ( 20 Jan 2020 12:30 )  PTT:27.2 sec    IMAGING: Chief complaint/Reason for consult: microcytic anemia no overt signs of GI bleeding     HPI:  A 68 yo F with PMHx of breast cancer, dementia, depression, seizures, h/o CVA x2 and Cerebral Aneurysm, anemia hgb 5.9 on 1/19 s/p  transfusion  at that time brought in for abnormal labs as per electronic medical records. No family members at bedside, Pt with dementia unable to provide history.  Pt does not answer  and keeps asking unrelated questions. Pt had EGD/colonoscopy on 1/2019 significant for duodenitis, non erosive gastritis and diverticulosis. (20 Jan 2020 14:44)    GI updates: 69yFemale pmh CVA x2 and cerebral aneurysm, anemia hgb 5.9 on 1/2019. Patient has dementia alert and oriented times 2 to person and place. Patient EGD/Colonoscopy 1/2019 significant for duodenitis, non erosive gastritis, and diverticulosis       PAST MEDICAL & SURGICAL HISTORY:   Brain aneurysm  Seizures  Depression, unspecified depression type  Hyperlipidemia, unspecified hyperlipidemia type  Dementia  Breast cancer in female  H/O abdominal hysterectomy  H/O mastectomy, left        Family history:  FAMILY HISTORY:  No pertinent family history in first degree relatives    No GI cancers in first or second degree relatives    Social History: No smoking. No alcohol. No illegal drug use.    Allergies:     No Known Allergies      MEDICATIONS: Home Medications:  docusate sodium 100 mg oral capsule: 1 cap(s) orally 2 times a day (14 Mar 2019 11:28)  escitalopram 10 mg oral tablet: 1 tab(s) orally once a day (14 Mar 2019 11:28)  lamoTRIgine 100 mg oral tablet: 1 tab(s) orally 2 times a day (14 Mar 2019 11:28)  Lasix 40 mg oral tablet: 1 tab(s) orally once a day (14 Mar 2019 11:28)  memantine 10 mg oral tablet: 1 tab(s) orally 2 times a day (14 Mar 2019 11:28)  pantoprazole 40 mg oral delayed release tablet: 1 tab(s) orally 2 times a day (14 Mar 2019 11:28)  senna oral tablet: 2 tab(s) orally once a day (at bedtime), As Needed (14 Mar 2019 11:28)  simvastatin 20 mg oral tablet: 1 tab(s) orally once a day (at bedtime) (14 Mar 2019 11:28)      MEDICATIONS  (STANDING):  chlorhexidine 4% Liquid 1 Application(s) Topical <User Schedule>  escitalopram 10 milliGRAM(s) Oral daily  lamoTRIgine 100 milliGRAM(s) Oral two times a day  memantine 10 milliGRAM(s) Oral two times a day  pantoprazole Infusion 8 mG/Hr (10 mL/Hr) IV Continuous <Continuous>  QUEtiapine 25 milliGRAM(s) Oral two times a day  simvastatin 20 milliGRAM(s) Oral at bedtime    MEDICATIONS  (PRN):  bisacodyl 5 milliGRAM(s) Oral daily PRN Constipation  ondansetron Injectable 4 milliGRAM(s) IV Push every 6 hours PRN Nausea  senna 2 Tablet(s) Oral at bedtime PRN Constipation        REVIEW OF SYSTEMS  General:  No weight loss, fevers, or chills.  Eyes:  No reported pain or visual changes  ENT:  No sore throat or runny nose.  NECK: No stiffness or lymphadenopathy  CV:  No chest pain or palpitations.  Resp:  No shortness of breath, cough, wheezing or hemoptysis  GI:  No abdominal pain, nausea, vomiting, dysphagia, diarrhea or constipation. No rectal bleeding, melena, or hematemesis.  Neuro:  No tingling, numbness       VITALS:   T(F): 99.8 (01-20-20 @ 16:07), Max: 99.8 (01-20-20 @ 16:07)  HR: 81 (01-20-20 @ 16:07) (68 - 101)  BP: 138/56 (01-20-20 @ 16:07) (127/67 - 138/56)  RR: 16 (01-20-20 @ 16:07) (16 - 20)  SpO2: 99% (01-20-20 @ 15:43) (98% - 99%)    PHYSICAL EXAM:  GENERAL: AAOx2 to person and place not time,  HEAD:  Atraumatic, Normocephalic  EYES: conjunctiva and sclera clear  NECK: Supple, No thyromegaly   CHEST/LUNG: Clear to auscultation bilaterally; No wheeze, rhonchi, or rales  HEART: Regular rate and rhythm; normal S1, S2, No murmurs.  ABDOMEN: Soft, nontender, nondistended; Bowel sounds present, no abdominal bruit, masses, or hepatosplenomegaly  NEUROLOGY: No asterixis or tremor  SKIN: Intact, no jaundice  Rectal exam-patient refused         LABS:  01-20    137  |  98  |  17  ----------------------------<  238<H>  3.7   |  25  |  0.9    Ca    8.7      20 Jan 2020 12:30    TPro  6.5  /  Alb  3.8  /  TBili  0.5  /  DBili  x   /  AST  27  /  ALT  10  /  AlkPhos  76  01-20                          3.7    5.55  )-----------( 166      ( 20 Jan 2020 12:30 )             14.0     LIVER FUNCTIONS - ( 20 Jan 2020 12:30 )  Alb: 3.8 g/dL / Pro: 6.5 g/dL / ALK PHOS: 76 U/L / ALT: 10 U/L / AST: 27 U/L / GGT: x           PT/INR - ( 20 Jan 2020 12:30 )   PT: 14.30 sec;   INR: 1.25 ratio         PTT - ( 20 Jan 2020 12:30 )  PTT:27.2 sec    IMAGING: Chief complaint/Reason for consult: microcytic anemia no overt signs of GI bleeding     HPI:  A 68 yo F with PMHx of breast cancer, dementia, depression, seizures, h/o CVA x2 and Cerebral Aneurysm, anemia hgb 5.9 on  s/p  transfusion  at that time brought in for abnormal labs as per electronic medical records. No family members at bedside, Pt with dementia unable to provide history.  Pt does not answer  and keeps asking unrelated questions. Pt had EGD/colonoscopy on 2019 significant for duodenitis, non erosive gastritis and diverticulosis. (2020 14:44)    GI updates: 69yFemale pmh CVA x2 and cerebral aneurysm, anemia hgb 5.9 on 2019. Patient has dementia alert and oriented times 2 to person and place. Patient EGD/Colonoscopy 2019 significant for duodenitis, non erosive gastritis, and diverticulosis and 3 polyps were removed. Biopsies showed sessile serrated non dysplastic polyp and one polyp was tubular adenoma. Patient denies nausea, vomiting, hematemesis, melena, blood in stool, diarrhea, constipation, abdominal pain but patient has history of dementia and is not a reliable historian.      PAST MEDICAL & SURGICAL HISTORY:   Brain aneurysm  Seizures  Depression, unspecified depression type  Hyperlipidemia, unspecified hyperlipidemia type  Dementia  Breast cancer in female  H/O abdominal hysterectomy  H/O mastectomy, left        Family history:  FAMILY HISTORY:  No pertinent family history in first degree relatives    No GI cancers in first or second degree relatives    Social History: No smoking. No alcohol. No illegal drug use.    Allergies:     No Known Allergies      MEDICATIONS: Home Medications:  docusate sodium 100 mg oral capsule: 1 cap(s) orally 2 times a day (14 Mar 2019 11:28)  escitalopram 10 mg oral tablet: 1 tab(s) orally once a day (14 Mar 2019 11:28)  lamoTRIgine 100 mg oral tablet: 1 tab(s) orally 2 times a day (14 Mar 2019 11:28)  Lasix 40 mg oral tablet: 1 tab(s) orally once a day (14 Mar 2019 11:28)  memantine 10 mg oral tablet: 1 tab(s) orally 2 times a day (14 Mar 2019 11:28)  pantoprazole 40 mg oral delayed release tablet: 1 tab(s) orally 2 times a day (14 Mar 2019 11:28)  senna oral tablet: 2 tab(s) orally once a day (at bedtime), As Needed (14 Mar 2019 11:28)  simvastatin 20 mg oral tablet: 1 tab(s) orally once a day (at bedtime) (14 Mar 2019 11:28)      MEDICATIONS  (STANDING):  chlorhexidine 4% Liquid 1 Application(s) Topical <User Schedule>  escitalopram 10 milliGRAM(s) Oral daily  lamoTRIgine 100 milliGRAM(s) Oral two times a day  memantine 10 milliGRAM(s) Oral two times a day  pantoprazole Infusion 8 mG/Hr (10 mL/Hr) IV Continuous <Continuous>  QUEtiapine 25 milliGRAM(s) Oral two times a day  simvastatin 20 milliGRAM(s) Oral at bedtime    MEDICATIONS  (PRN):  bisacodyl 5 milliGRAM(s) Oral daily PRN Constipation  ondansetron Injectable 4 milliGRAM(s) IV Push every 6 hours PRN Nausea  senna 2 Tablet(s) Oral at bedtime PRN Constipation        REVIEW OF SYSTEMS  General:  No weight loss, fevers, or chills.  Eyes:  No reported pain or visual changes  ENT:  No sore throat or runny nose.  NECK: No stiffness or lymphadenopathy  CV:  No chest pain or palpitations.  Resp:  No shortness of breath, cough, wheezing or hemoptysis  GI:  No abdominal pain, nausea, vomiting, dysphagia, diarrhea or constipation. No rectal bleeding, melena, or hematemesis.  Neuro:  No tingling, numbness       VITALS:   T(F): 99.8 (20 @ 16:07), Max: 99.8 (20 @ 16:07)  HR: 81 (20 @ 16:07) (68 - 101)  BP: 138/56 (20 @ 16:07) (127/67 - 138/56)  RR: 16 (20 @ 16:07) (16 - 20)  SpO2: 99% (20 @ 15:43) (98% - 99%)    PHYSICAL EXAM:  GENERAL: AAOx2 to person and place not time,  HEAD:  Atraumatic, Normocephalic  EYES: conjunctiva and sclera clear  NECK: Supple, No thyromegaly   CHEST/LUNG: Clear to auscultation bilaterally; No wheeze, rhonchi, or rales  HEART: Regular rate and rhythm; normal S1, S2, No murmurs.  ABDOMEN: Soft, nontender, nondistended; Bowel sounds present, no abdominal bruit, masses, or hepatosplenomegaly  NEUROLOGY: No asterixis or tremor  SKIN: Intact, no jaundice  Rectal exam-patient refused         LABS:      137  |  98  |  17  ----------------------------<  238<H>  3.7   |  25  |  0.9    Ca    8.7      2020 12:30    TPro  6.5  /  Alb  3.8  /  TBili  0.5  /  DBili  x   /  AST  27  /  ALT  10  /  AlkPhos  76                            3.7    5.55  )-----------( 166      ( 2020 12:30 )             14.0     LIVER FUNCTIONS - ( 2020 12:30 )  Alb: 3.8 g/dL / Pro: 6.5 g/dL / ALK PHOS: 76 U/L / ALT: 10 U/L / AST: 27 U/L / GGT: x           PT/INR - ( 2020 12:30 )   PT: 14.30 sec;   INR: 1.25 ratio         PTT - ( 2020 12:30 )  PTT:27.2 sec    IMAGING:      < from: EGD (19 @ 11:00) >  EGD Report Date: 2019 11:00 AM   Patient Name: SONYA YI   MRN: [id number]   Account Number: [id number]  Gender: Female    (age): 1950 (68)   Instrument(s): [id number])    Attending:   Jai Romo MD       Referring Physician:   ED PHYSICIAN UNASSIGNED       Nurse(s):   Cynthia Reyes           Indications: Anemia: 285.9 - D64.9  Procedure:   The procedure, indications, preparation and potential complications were  explained to the patient, who indicated understanding and signed the  corresponding consent forms. MAC was administered Continuous pulse oximetry and  blood pressure monitoring were used throughout the procedure. Supplemental  oxygen was used. Patient was placed in left lateral decubitus. The endoscope was  introduced through mouth and advanced under direct visualization until second  part of the duodenum reached. Patient tolerance to the procedure was good. The  procedure was not difficult. Blood loss was minimal.      Limitations/Complications: There were no apparent limitations or complications  Findings:   Esophagus Additional esophagus findings GE junction nodularity. Four quadrant  cold forceps biopsies were performed for histology in the endoscopically  apparent metaplastic area. Biopsies were taken at levels every 1 cm starting at  the top of the gastric folds.   Stomach Mucosa Diffuse erythema of the mucosa was noted in the antrum, stomach  body and fundus. These findings are compatible with non-erosive gastritis. Six  cold forceps biopsies were performed for histology in the antrum, stomach body  and fundus.   Duodenum Mucosa Erythema of the mucosa was noted in the first part of the  duodenum and second part of the duodenum. These findings are compatible with  duodenitis. Moderate. Six cold forceps biopsies were performed for histology in  the first part of the duodenum and second part of the duodenum.                     Impressions:    GE junction nodularity (Biopsy).    Erythema in the antrum, stomach body and fundus compatible with non-erosive  gastritis. (Biopsy).    Erythema in the first part of the duodenum and second part of the duodenum  compatible with duodenitis. (Biopsy).     Plan: Await pathology results  GERD/Ulcer Diet  Follow-up office visit in 2-3 weeks  PPI QD       Jai Romo MD    Version 1, Electronically signed on 2019 1:08:00 PM by Jai Romo MD    < end of copied text >      < from: Colonoscopy (19 @ 11:30) >  Colonoscopy Report Date: 2019 11:30 AM   Patient Name: SONYA YI   MRN: [id number]   Account Number: [id number]  Gender: Female    (age): 1950 (68)   Instrument(s): [id number])    Attending:   Jai Romo MD       Referring Physician:   ED PHYSICIAN UNASSIGNED             Indications: Anemia: 285.9 - D64.9  Procedure:   undergoing Diagnostic colonoscopy.     The procedure, indications, preparation and potential complications were  explained to the patient, whoindicated understanding and signed the  corresponding consent forms. MAC was administered by nurse anesthetist.  Continuous pulse oximetry and blood pressure monitoring were used throughout the  procedure. Supplemental oxygen was used. The quality ofpreparation was fair.  Patient was placed in left lateral decubitus position. The colonoscope was  introduced through rectum and advanced under direct visualization until cecum  was reached The appendiceal orifice and the ileo-cecal valve were identified.  The colonoscope was retroflexed within the rectum. Careful visualization was  performed as the instrument was withdrawn. Patient tolerance to procedure was  excellent. The procedure was not difficult. Digital exam was normal with the  followingfindings: hemorrhoids      Limitations/Complications: Retained Stool limited visualization of mucosa to  some extent. small lesions might've been missed.  Findings:   Excavated lesions Multiple non-bleeding diverticula with medium openings were  seenin the sigmoid colon. Diverticulosis appeared to be of moderate severity.    Protruding lesions A single sessile 1 cm polyp of benign appearance was found in  the ascending colon. This polyp was on the lip of a diverticulum. A single-piece  polypectomy was performed using a cold snare. The polyp was completely removed.    A single sessile 5 mm polyp of benign appearance was found in the cecum. A  piece-meal polypectomy was performed using a cold forceps. The polyp was  completely removed and retrieved.   A single sessile 12 mm polyp of benign appearance was found at 35 cm from the  anus. Residual adenomatous tissue was removed using cold biopsy forceps. A  single-piece polypectomy was performed using a hot snare. The polyp was  completely removed.   Additional findings Terminal ileum could not be intubated.                     Impressions:    Moderate diverticulosis of the sigmoid colon.    Polyp (1 cm) in the ascending colon. (Polypectomy).    Polyp (5 mm) in the cecum. (Polypectomy).   Polyp (12 mm) in the colon. (Polypectomy).    Terminal ileum could not be intubated.     Plan: Await pathology results  Follow-up office visit in 2-3 weeks  High Fiber Diet   The findings of diverticulosis or 3 moderately sized polyps could potentially  explain anemia. Monitor CBC. If hgb drops and/or patient develops further overt  bleeding, will consider repeat colonoscopy vs. video capsule endoscopy           Jai Romo MD    Version 1, Electronically signed on 2019 1:14:43 PM by Jai Romo MD    < end of copied text >

## 2020-01-20 NOTE — H&P ADULT - PROBLEM SELECTOR PLAN 1
-transfuse stat   -trend cbc  -keep type and screen active will transfuse 2 units of RBC  Monitor H/H  Iron study, folate, and B12 level  GI consult  previous EGD/Colonoscopy reviewed will transfuse 2 units of RBC  Monitor H/H  Iron study, folate, and B12 level  GI consult  previous EGD/Colonoscopy reviewed  IV PPI drip

## 2020-01-20 NOTE — ED PROVIDER NOTE - PHYSICAL EXAMINATION
CONSTITUTIONAL: Well-developed; well-nourished; in no acute distress.   SKIN: warm, dry  HEAD: Normocephalic.  EYES: PERRL, EOMI.  ENT: Airway clear.  NECK: Supple.  LYMPH: No acute cervical adenopathy.  CARD: No murmurs, rubs or gallops. Regular rate and rhythm.   RESP: No wheezing, rales or rhonchi.  ABD: soft ntnd  EXT: No clubbing, cyanosis.   NEURO: Alert, oriented at baseline for dementia per family.  PSYCH: Cooperative, appropriate.  HAI: negative for gross blood, no masses or hemorrhoids

## 2020-01-20 NOTE — H&P ADULT - ASSESSMENT
A 68 yo F with PMHx of breast cancer, dementia, depression, seizures, h/o CVA x2 and Cerebral Aneurysm, anemia hgb 5.9 on 1/19 s/p  transfusion  at that time brought in for abnormal labs by pts daughter as per EMS. Pt found in ed with severe anemia hgb 3.7. Pt hemodynamically stable.

## 2020-01-20 NOTE — H&P ADULT - NSHPPHYSICALEXAM_GEN_ALL_CORE
VITALS:   ICU Vital Signs Last 24 Hrs  T(C): 36.9 (20 Jan 2020 12:43), Max: 37.4 (20 Jan 2020 11:07)  T(F): 98.5 (20 Jan 2020 12:43), Max: 99.3 (20 Jan 2020 11:07)  HR: 74 (20 Jan 2020 12:43) (74 - 101)  BP: 127/67 (20 Jan 2020 12:43) (127/67 - 130/60)  RR: 20 (20 Jan 2020 12:43) (20 - 20)  SpO2: 98% (20 Jan 2020 12:43) (98% - 98%)          GENERAL: NAD, lying in bed comfortably, pt alert to person only   HEAD:  Atraumatic, Normocephalic  EYES: EOMI, PERRLA, conjunctiva and sclera clear  CHEST/LUNG: Clear to auscultation bilaterally; No rales, rhonchi, wheezing, or rubs.   HEART: Regular rate and rhythm; No murmurs, rubs, or gallops  ABDOMEN:  obese, Soft, Nontender, Nondistended. No hepatomegaly  EXTREMITIES:  mild edema, no tenderness   NERVOUS SYSTEM:  Alert & Oriented X3, speech clear. No deficits   MSK: FROM all 4 extremities, full and equal strength  SKIN: No rashes or lesions VITALS:   ICU Vital Signs Last 24 Hrs  T(C): 36.9 (20 Jan 2020 12:43), Max: 37.4 (20 Jan 2020 11:07)  T(F): 98.5 (20 Jan 2020 12:43), Max: 99.3 (20 Jan 2020 11:07)  HR: 74 (20 Jan 2020 12:43) (74 - 101)  BP: 127/67 (20 Jan 2020 12:43) (127/67 - 130/60)  RR: 20 (20 Jan 2020 12:43) (20 - 20)  SpO2: 98% (20 Jan 2020 12:43) (98% - 98%)          GENERAL: NAD, lying in bed comfortably, pt alert to person only   HEAD:  Atraumatic, Normocephalic  EYES: EOMI, PERRLA, conjunctiva and sclera clear  CHEST/LUNG: Clear to auscultation bilaterally; No rales, rhonchi, wheezing, or rubs.   HEART: Regular rate and rhythm; No murmurs, rubs, or gallops  ABDOMEN:  obese, Soft, Nontender, Nondistended. No hepatomegaly.  refused rectal  EXTREMITIES:  mild edema, no tenderness   NERVOUS SYSTEM:  Alert & pleasantly confused.   No deficits   MSK: FROM all 4 extremities, full and equal strength  SKIN: No rashes or lesions

## 2020-01-20 NOTE — ED PROVIDER NOTE - NS ED ROS FT
Constitutional: (-) fever  Eyes/ENT: (-) runny nose  Cardiovascular: (-) chest pain, (-) syncope  Respiratory: (-) cough, (-) shortness of breath  Gastrointestinal: (-) vomiting, (-) diarrhea, (-) abdominal pain  : (-) dysuria   Musculoskeletal: (-) back pain, (-) joint pain  Integumentary: (-) rash  Neurological: (-)loc  Allergic/Immunologic: (-) pruritus  Endocrine: (-) history of thyroid disease

## 2020-01-20 NOTE — ED PROVIDER NOTE - PROGRESS NOTE DETAILS
SC: Hgb 3.7, PT mentating well, no brbpr, no melena or hematuria. will transfuse and admit for further evaluation.

## 2020-01-20 NOTE — ED PROVIDER NOTE - OBJECTIVE STATEMENT
69F with pmh of anemia of unknown etiology requiring intermittent transfusions, HLD, seizure disorder, dementia brain aneurysm presents sent in due to low hemoglobin. PT denies CP, SOB, fever, chills, rectal bleeding, melena, or hematochezia. No blood thinners.

## 2020-01-20 NOTE — PATIENT PROFILE ADULT - NSPROPOAURINARYCATHETER_GEN_A_NUR
Mana calling stating pt is slowly gaining weight back, states when she last spoke with Kike Topete his weight was 268, yesterday it was 271, she did put an extra water pill in his pill box for this week but mana is questioning what to do after that, please advise at above number. no

## 2020-01-20 NOTE — CHART NOTE - NSCHARTNOTEFT_GEN_A_CORE
Patient's repeat Hgb 5.6 from 3.7, patient remains hemodynamically stable, s/p 2 units PRBCs thus far, will order 2 additional units and 1 FFP.

## 2020-01-21 LAB
ALBUMIN SERPL ELPH-MCNC: 3.9 G/DL — SIGNIFICANT CHANGE UP (ref 3.5–5.2)
ALP SERPL-CCNC: 75 U/L — SIGNIFICANT CHANGE UP (ref 30–115)
ALT FLD-CCNC: 11 U/L — SIGNIFICANT CHANGE UP (ref 0–41)
ANION GAP SERPL CALC-SCNC: 14 MMOL/L — SIGNIFICANT CHANGE UP (ref 7–14)
AST SERPL-CCNC: 25 U/L — SIGNIFICANT CHANGE UP (ref 0–41)
BILIRUB SERPL-MCNC: 0.9 MG/DL — SIGNIFICANT CHANGE UP (ref 0.2–1.2)
BUN SERPL-MCNC: 12 MG/DL — SIGNIFICANT CHANGE UP (ref 10–20)
CALCIUM SERPL-MCNC: 8.8 MG/DL — SIGNIFICANT CHANGE UP (ref 8.5–10.1)
CHLORIDE SERPL-SCNC: 102 MMOL/L — SIGNIFICANT CHANGE UP (ref 98–110)
CO2 SERPL-SCNC: 25 MMOL/L — SIGNIFICANT CHANGE UP (ref 17–32)
CREAT SERPL-MCNC: 0.8 MG/DL — SIGNIFICANT CHANGE UP (ref 0.7–1.5)
FERRITIN SERPL-MCNC: 6 NG/ML — LOW (ref 15–150)
FOLATE SERPL-MCNC: 9.8 NG/ML — SIGNIFICANT CHANGE UP
GLUCOSE SERPL-MCNC: 157 MG/DL — HIGH (ref 70–99)
HCT VFR BLD CALC: 23.9 % — LOW (ref 37–47)
HGB BLD-MCNC: 7.1 G/DL — LOW (ref 12–16)
MANUAL DIF COMMENT BLD-IMP: SIGNIFICANT CHANGE UP
MANUAL DIF COMMENT BLD-IMP: SIGNIFICANT CHANGE UP
MCHC RBC-ENTMCNC: 23.5 PG — LOW (ref 27–31)
MCHC RBC-ENTMCNC: 29.7 G/DL — LOW (ref 32–37)
MCV RBC AUTO: 79.1 FL — LOW (ref 81–99)
NRBC # BLD: 0 /100 WBCS — SIGNIFICANT CHANGE UP (ref 0–0)
PLATELET # BLD AUTO: 161 K/UL — SIGNIFICANT CHANGE UP (ref 130–400)
POTASSIUM SERPL-MCNC: 3.5 MMOL/L — SIGNIFICANT CHANGE UP (ref 3.5–5)
POTASSIUM SERPL-SCNC: 3.5 MMOL/L — SIGNIFICANT CHANGE UP (ref 3.5–5)
PROT SERPL-MCNC: 6.7 G/DL — SIGNIFICANT CHANGE UP (ref 6–8)
RBC # BLD: 3.02 M/UL — LOW (ref 4.2–5.4)
RBC # FLD: 18.6 % — HIGH (ref 11.5–14.5)
SODIUM SERPL-SCNC: 141 MMOL/L — SIGNIFICANT CHANGE UP (ref 135–146)
VIT B12 SERPL-MCNC: 499 PG/ML — SIGNIFICANT CHANGE UP (ref 232–1245)
WBC # BLD: 5.79 K/UL — SIGNIFICANT CHANGE UP (ref 4.8–10.8)
WBC # FLD AUTO: 5.79 K/UL — SIGNIFICANT CHANGE UP (ref 4.8–10.8)

## 2020-01-21 PROCEDURE — 99231 SBSQ HOSP IP/OBS SF/LOW 25: CPT

## 2020-01-21 PROCEDURE — 99233 SBSQ HOSP IP/OBS HIGH 50: CPT

## 2020-01-21 RX ORDER — FUROSEMIDE 40 MG
40 TABLET ORAL ONCE
Refills: 0 | Status: COMPLETED | OUTPATIENT
Start: 2020-01-21 | End: 2020-01-21

## 2020-01-21 RX ADMIN — PANTOPRAZOLE SODIUM 10 MG/HR: 20 TABLET, DELAYED RELEASE ORAL at 02:31

## 2020-01-21 RX ADMIN — SIMVASTATIN 20 MILLIGRAM(S): 20 TABLET, FILM COATED ORAL at 21:03

## 2020-01-21 RX ADMIN — LAMOTRIGINE 100 MILLIGRAM(S): 25 TABLET, ORALLY DISINTEGRATING ORAL at 17:55

## 2020-01-21 RX ADMIN — Medication 40 MILLIGRAM(S): at 17:57

## 2020-01-21 RX ADMIN — PANTOPRAZOLE SODIUM 10 MG/HR: 20 TABLET, DELAYED RELEASE ORAL at 12:03

## 2020-01-21 RX ADMIN — QUETIAPINE FUMARATE 25 MILLIGRAM(S): 200 TABLET, FILM COATED ORAL at 17:55

## 2020-01-21 RX ADMIN — ESCITALOPRAM OXALATE 10 MILLIGRAM(S): 10 TABLET, FILM COATED ORAL at 11:22

## 2020-01-21 RX ADMIN — QUETIAPINE FUMARATE 25 MILLIGRAM(S): 200 TABLET, FILM COATED ORAL at 05:51

## 2020-01-21 RX ADMIN — LAMOTRIGINE 100 MILLIGRAM(S): 25 TABLET, ORALLY DISINTEGRATING ORAL at 05:52

## 2020-01-21 RX ADMIN — MEMANTINE HYDROCHLORIDE 10 MILLIGRAM(S): 10 TABLET ORAL at 05:51

## 2020-01-21 RX ADMIN — MEMANTINE HYDROCHLORIDE 10 MILLIGRAM(S): 10 TABLET ORAL at 17:55

## 2020-01-21 NOTE — PROGRESS NOTE ADULT - SUBJECTIVE AND OBJECTIVE BOX
CC.  Anemia  HPI.  Patient appears to be confused.  Offers no other complaints    poor historian/unable to obtain reliable hx       Vital Signs Last 24 Hrs  T(C): 36.4 (01-21-20 @ 05:16), Max: 37.7 (01-20-20 @ 16:07)  T(F): 97.5 (01-21-20 @ 05:16), Max: 99.8 (01-20-20 @ 16:07)  HR: 63 (01-21-20 @ 05:16) (63 - 101)  BP: 131/58 (01-21-20 @ 05:16) (111/56 - 145/65)  BP(mean): --  RR: 16 (01-21-20 @ 05:16) (16 - 20)  SpO2: 96% (01-21-20 @ 05:50) (95% - 99%)        PHYSICAL EXAM-  GENERAL: NAD, chronic ill appears female   HEAD:  Atraumatic, Normocephalic  EYES: EOMI, PERRLA, conjunctiva and sclera clear  NECK: Supple, No JVD, Normal thyroid  NERVOUS SYSTEM:  Alert & Oriented x1.  moving all extremities  CHEST/LUNG: Clear to percussion bilaterally; No rales, rhonchi, wheezing, or rubs  HEART: Regular rate and rhythm; No murmurs, rubs, or gallops  ABDOMEN: Soft, Nontender, Nondistended; Bowel sounds present  EXTREMITIES:  No clubbing, cyanosis, or edema  SKIN: No rashes or lesions                                  5.6    6.77  )-----------( 171      ( 20 Jan 2020 23:00 )             18.9     01-20    137  |  98  |  17  ----------------------------<  238<H>  3.7   |  25  |  0.9    Ca    8.7      20 Jan 2020 12:30    TPro  6.5  /  Alb  3.8  /  TBili  0.5  /  DBili  x   /  AST  27  /  ALT  10  /  AlkPhos  76  01-20            PT/INR - ( 20 Jan 2020 12:30 )   PT: 14.30 sec;   INR: 1.25 ratio         PTT - ( 20 Jan 2020 12:30 )  PTT:27.2 sec        MEDICATIONS  (STANDING):  chlorhexidine 4% Liquid 1 Application(s) Topical <User Schedule>  escitalopram 10 milliGRAM(s) Oral daily  lamoTRIgine 100 milliGRAM(s) Oral two times a day  memantine 10 milliGRAM(s) Oral two times a day  pantoprazole Infusion 8 mG/Hr (10 mL/Hr) IV Continuous <Continuous>  QUEtiapine 25 milliGRAM(s) Oral two times a day  simvastatin 20 milliGRAM(s) Oral at bedtime    MEDICATIONS  (PRN):  bisacodyl 5 milliGRAM(s) Oral daily PRN Constipation  ondansetron Injectable 4 milliGRAM(s) IV Push every 6 hours PRN Nausea  senna 2 Tablet(s) Oral at bedtime PRN Constipation          RADIOLOGY RESULTS:

## 2020-01-21 NOTE — CONSULT NOTE ADULT - SUBJECTIVE AND OBJECTIVE BOX
Reviewed and referred to chart notes.    Reason for Admission: anemia	  History of Present Illness: 	  A 68 yo F with PMHx of breast cancer, dementia, depression, seizures, h/o CVA x2 and Cerebral Aneurysm, anemia hgb 5.9 on 1/19 s/p  transfusion  at that time brought in for abnormal labs as per electronic medical records. No family members at bedside, Pt with dementia unable to provide history.  Pt does not answer  and keeps asking unrelated questions. Pt had EGD/colonoscopy on 1/2019 significant for duodenitis, non erosive gastritis and diverticulosis.       pt is known to me through her prior hospitalization. pt is drowsy with slow mentation. pt is oriented to self and place and not oriented to time and situation. she is  repeating "i don't know why i am here"  no evidence of delirium. she has significant global cognitive impairment. no psychosis. no s/h ideation.

## 2020-01-21 NOTE — PROGRESS NOTE ADULT - ASSESSMENT
A 70 yo F with PMHx of breast cancer, dementia, depression, seizures, h/o CVA x2 and Cerebral Aneurysm, anemia hgb 5.9 on 1/19 s/p  transfusion  at that time brought in for abnormal labs by pts daughter as per EMS. Pt found in ed with severe anemia hgb 3.7. Pt hemodynamically stable.

## 2020-01-21 NOTE — PROGRESS NOTE ADULT - PROBLEM SELECTOR PLAN 1
-S/P 4 units of RBC transfusion.  Repeat CBC after transfusion pending  -Monitor H/H  -Iron study shows iron def anemia.  B12, and folate level ok  -Patient refuses procedure, discussed case with daughter who is willing to try to talk to her to convince her with procedure  -previous EGD/Colonoscopy reviewed  -IV PPI drip  -GI to follow up

## 2020-01-22 DIAGNOSIS — E87.6 HYPOKALEMIA: ICD-10-CM

## 2020-01-22 LAB
ALBUMIN SERPL ELPH-MCNC: 3.7 G/DL — SIGNIFICANT CHANGE UP (ref 3.5–5.2)
ALP SERPL-CCNC: 76 U/L — SIGNIFICANT CHANGE UP (ref 30–115)
ALT FLD-CCNC: 12 U/L — SIGNIFICANT CHANGE UP (ref 0–41)
ANION GAP SERPL CALC-SCNC: 14 MMOL/L — SIGNIFICANT CHANGE UP (ref 7–14)
AST SERPL-CCNC: 32 U/L — SIGNIFICANT CHANGE UP (ref 0–41)
BILIRUB SERPL-MCNC: 0.9 MG/DL — SIGNIFICANT CHANGE UP (ref 0.2–1.2)
BUN SERPL-MCNC: 11 MG/DL — SIGNIFICANT CHANGE UP (ref 10–20)
CALCIUM SERPL-MCNC: 8.6 MG/DL — SIGNIFICANT CHANGE UP (ref 8.5–10.1)
CHLORIDE SERPL-SCNC: 103 MMOL/L — SIGNIFICANT CHANGE UP (ref 98–110)
CO2 SERPL-SCNC: 26 MMOL/L — SIGNIFICANT CHANGE UP (ref 17–32)
CREAT SERPL-MCNC: 0.9 MG/DL — SIGNIFICANT CHANGE UP (ref 0.7–1.5)
GLUCOSE SERPL-MCNC: 145 MG/DL — HIGH (ref 70–99)
HCT VFR BLD CALC: 26.6 % — LOW (ref 37–47)
HGB BLD-MCNC: 8.1 G/DL — LOW (ref 12–16)
MCHC RBC-ENTMCNC: 24.5 PG — LOW (ref 27–31)
MCHC RBC-ENTMCNC: 30.5 G/DL — LOW (ref 32–37)
MCV RBC AUTO: 80.6 FL — LOW (ref 81–99)
NRBC # BLD: 0 /100 WBCS — SIGNIFICANT CHANGE UP (ref 0–0)
PLATELET # BLD AUTO: 151 K/UL — SIGNIFICANT CHANGE UP (ref 130–400)
POTASSIUM SERPL-MCNC: 3.4 MMOL/L — LOW (ref 3.5–5)
POTASSIUM SERPL-SCNC: 3.4 MMOL/L — LOW (ref 3.5–5)
PROT SERPL-MCNC: 6.4 G/DL — SIGNIFICANT CHANGE UP (ref 6–8)
RBC # BLD: 3.3 M/UL — LOW (ref 4.2–5.4)
RBC # FLD: 18.5 % — HIGH (ref 11.5–14.5)
SODIUM SERPL-SCNC: 143 MMOL/L — SIGNIFICANT CHANGE UP (ref 135–146)
WBC # BLD: 5.93 K/UL — SIGNIFICANT CHANGE UP (ref 4.8–10.8)
WBC # FLD AUTO: 5.93 K/UL — SIGNIFICANT CHANGE UP (ref 4.8–10.8)

## 2020-01-22 PROCEDURE — 99233 SBSQ HOSP IP/OBS HIGH 50: CPT

## 2020-01-22 RX ORDER — SODIUM CHLORIDE 9 MG/ML
1000 INJECTION, SOLUTION INTRAVENOUS
Refills: 0 | Status: DISCONTINUED | OUTPATIENT
Start: 2020-01-22 | End: 2020-01-25

## 2020-01-22 RX ORDER — SODIUM CHLORIDE 9 MG/ML
1000 INJECTION, SOLUTION INTRAVENOUS
Refills: 0 | Status: DISCONTINUED | OUTPATIENT
Start: 2020-01-22 | End: 2020-01-22

## 2020-01-22 RX ORDER — POTASSIUM CHLORIDE 20 MEQ
40 PACKET (EA) ORAL EVERY 4 HOURS
Refills: 0 | Status: COMPLETED | OUTPATIENT
Start: 2020-01-22 | End: 2020-01-22

## 2020-01-22 RX ADMIN — MEMANTINE HYDROCHLORIDE 10 MILLIGRAM(S): 10 TABLET ORAL at 06:32

## 2020-01-22 RX ADMIN — PANTOPRAZOLE SODIUM 10 MG/HR: 20 TABLET, DELAYED RELEASE ORAL at 17:26

## 2020-01-22 RX ADMIN — Medication 40 MILLIEQUIVALENT(S): at 19:58

## 2020-01-22 RX ADMIN — LAMOTRIGINE 100 MILLIGRAM(S): 25 TABLET, ORALLY DISINTEGRATING ORAL at 17:25

## 2020-01-22 RX ADMIN — Medication 40 MILLIEQUIVALENT(S): at 21:03

## 2020-01-22 RX ADMIN — ESCITALOPRAM OXALATE 10 MILLIGRAM(S): 10 TABLET, FILM COATED ORAL at 11:18

## 2020-01-22 RX ADMIN — PANTOPRAZOLE SODIUM 10 MG/HR: 20 TABLET, DELAYED RELEASE ORAL at 00:54

## 2020-01-22 RX ADMIN — QUETIAPINE FUMARATE 25 MILLIGRAM(S): 200 TABLET, FILM COATED ORAL at 17:25

## 2020-01-22 RX ADMIN — SIMVASTATIN 20 MILLIGRAM(S): 20 TABLET, FILM COATED ORAL at 21:03

## 2020-01-22 RX ADMIN — MEMANTINE HYDROCHLORIDE 10 MILLIGRAM(S): 10 TABLET ORAL at 17:25

## 2020-01-22 RX ADMIN — SODIUM CHLORIDE 50 MILLILITER(S): 9 INJECTION, SOLUTION INTRAVENOUS at 17:25

## 2020-01-22 RX ADMIN — QUETIAPINE FUMARATE 25 MILLIGRAM(S): 200 TABLET, FILM COATED ORAL at 06:32

## 2020-01-22 RX ADMIN — LAMOTRIGINE 100 MILLIGRAM(S): 25 TABLET, ORALLY DISINTEGRATING ORAL at 06:32

## 2020-01-22 NOTE — PROGRESS NOTE ADULT - SUBJECTIVE AND OBJECTIVE BOX
SONYA YI  69y  Female      Patient is a 69y old  Female who presents with nemia (21 Jan 2020 10:52)      INTERVAL HPI/OVERNIGHT EVENTS:  Patient seen and examined earlier this morning.  Sitting in bed on a 1:1 sit.  Denies any complaints.  Pt is demented.       REVIEW OF SYSTEMS:  CONSTITUTIONAL: No fever, weight loss, or fatigue  EYES: No eye pain, visual disturbances, or discharge  ENMT:  No difficulty hearing, tinnitus, vertigo; No sinus or throat pain  NECK: No pain or stiffness  RESPIRATORY: No cough, wheezing, chills or hemoptysis; No shortness of breath  CARDIOVASCULAR: No chest pain, palpitations, dizziness, or leg swelling  GASTROINTESTINAL: No abdominal or epigastric pain. No nausea, vomiting, or hematemesis; No diarrhea or constipation. No melena or hematochezia.  GENITOURINARY: No dysuria, frequency, hematuria, or incontinence  NEUROLOGICAL: No headaches, memory loss, loss of strength, numbness, or tremors  SKIN: No itching, burning, rashes, or lesions   LYMPH NODES: No enlarged glands  ENDOCRINE: No heat or cold intolerance; No hair loss  MUSCULOSKELETAL: No joint pain or swelling; No muscle, back, or extremity pain  PSYCHIATRIC: No depression, anxiety, mood swings, or difficulty sleeping  HEME/LYMPH: No easy bruising, or bleeding gums  ALLERY AND IMMUNOLOGIC: No hives or eczema    T(C): 37.5 (01-22-20 @ 13:42), Max: 37.5 (01-22-20 @ 13:42)  HR: 63 (01-22-20 @ 13:42) (63 - 67)  BP: 118/56 (01-22-20 @ 13:42) (115/52 - 128/62)  RR: 16 (01-22-20 @ 13:42) (16 - 16)  SpO2: --    PHYSICAL EXAM:  GENERAL: NAD, well-groomed, well-developed  HEAD:  Atraumatic, Normocephalic  EYES: EOMI, PERRLA, conjunctiva and sclera clear  ENMT: No tonsillar erythema, exudates, or enlargement; Moist mucous membranes, Good dentition, No lesions  NECK: Supple, No JVD, Normal thyroid  NERVOUS SYSTEM:  Alert & Oriented X3, Good concentration; Motor Strength 5/5 B/L upper and lower extremities; DTRs 2+ intact and symmetric  CHEST/LUNG: Clear to percussion bilaterally; No rales, rhonchi, wheezing, or rubs  HEART: Regular rate and rhythm; No murmurs, rubs, or gallops  ABDOMEN: Soft, Nontender, Nondistended; Bowel sounds present  EXTREMITIES:  2+ Peripheral Pulses, No clubbing, cyanosis, or edema  LYMPH: No lymphadenopathy noted  SKIN: No rashes or lesions    Consultant(s) Notes Reviewed:  [x ] YES  [ ] NO  Care Discussed with Consultants/Other Providers [ x] YES  [ ] NO    LAB:                        8.1    5.93  )-----------( 151      ( 22 Jan 2020 07:42 )             26.6     01-22    143  |  103  |  11  ----------------------------<  145<H>  3.4<L>   |  26  |  0.9    Ca    8.6      22 Jan 2020 07:42    TPro  6.4  /  Alb  3.7  /  TBili  0.9  /  DBili  x   /  AST  32  /  ALT  12  /  AlkPhos  76  01-22    LIVER FUNCTIONS - ( 22 Jan 2020 07:42 )  Alb: 3.7 g/dL / Pro: 6.4 g/dL / ALK PHOS: 76 U/L / ALT: 12 U/L / AST: 32 U/L / GGT: x                       Drug Dosing Weight  Height (cm): 162.6 (20 Jan 2020 16:07)  Weight (kg): 110.6 (20 Jan 2020 16:07)  BMI (kg/m2): 41.8 (20 Jan 2020 16:07)  BSA (m2): 2.13 (20 Jan 2020 16:07)    CAPILLARY BLOOD GLUCOSE        I&O's Summary        RADIOLOGY & ADDITIONAL TESTS:  Imaging Personally Reviewed:  [x] YES  [ ] NO    HEALTH ISSUES - PROBLEM Dx:  Breast cancer in female: Breast cancer in female  Brain aneurysm: Brain aneurysm  Dementia: Dementia  Hyperlipidemia, unspecified hyperlipidemia type: Hyperlipidemia, unspecified hyperlipidemia type  Depression, unspecified depression type: Depression, unspecified depression type  Seizures: Seizures  Anemia: Anemia          MEDS:  bisacodyl 5 milliGRAM(s) Oral daily PRN  chlorhexidine 4% Liquid 1 Application(s) Topical <User Schedule>  escitalopram 10 milliGRAM(s) Oral daily  lamoTRIgine 100 milliGRAM(s) Oral two times a day  memantine 10 milliGRAM(s) Oral two times a day  ondansetron Injectable 4 milliGRAM(s) IV Push every 6 hours PRN  pantoprazole Infusion 8 mG/Hr IV Continuous <Continuous>  QUEtiapine 25 milliGRAM(s) Oral two times a day  senna 2 Tablet(s) Oral at bedtime PRN  simvastatin 20 milliGRAM(s) Oral at bedtime      Progress Note Handoff  Pending Consults:  Pending Tests:  Pending Results:  Family Discussion:  Disposition: Home_____/SNF______/Other_____/Unknown at this time_____    Please call me with any questions at extension [phone] SONYA YI  69y  Female      Patient is a 69y old  Female who presents with anemia (21 Jan 2020 10:52)      INTERVAL HPI/OVERNIGHT EVENTS:  Patient seen and examined earlier this morning.  Sitting in bed on a 1:1 sit.  Denies any complaints.  Pt is demented.       REVIEW OF SYSTEMS:  unable to assess due to dementia    Vital Signs Last 24 Hrs  T(C): 37.5 (22 Jan 2020 13:42), Max: 37.5 (22 Jan 2020 13:42)  T(F): 99.5 (22 Jan 2020 13:42), Max: 99.5 (22 Jan 2020 13:42)  HR: 63 (22 Jan 2020 13:42) (63 - 67)  BP: 118/56 (22 Jan 2020 13:42) (115/52 - 128/62)  BP(mean): --  RR: 16 (22 Jan 2020 13:42) (16 - 16)  SpO2: --        PHYSICAL EXAM:  GENERAL: NAD, well-groomed, well-developed  HEAD:  Atraumatic, Normocephalic  EYES: EOMI, PERRLA, conjunctiva and sclera clear  ENMT: No tonsillar erythema, exudates, or enlargement; Moist mucous membranes, Good dentition, No lesions  NECK: Supple, No JVD, Normal thyroid  NERVOUS SYSTEM:  awake/alert; confused   CHEST/LUNG: Clear to percussion bilaterally; No rales, rhonchi, wheezing, or rubs  HEART: Regular rate and rhythm; No murmurs, rubs, or gallops  ABDOMEN: Soft, Nontender, Nondistended; Bowel sounds present  EXTREMITIES:  2+ Peripheral Pulses, No clubbing, cyanosis, or edema  LYMPH: No lymphadenopathy noted  SKIN: No rashes or lesions    Consultant(s) Notes Reviewed:  [x ] YES  [ ] NO  Care Discussed with Consultants/Other Providers [ x] YES  [ ] NO    LAB:                        8.1    5.93  )-----------( 151      ( 22 Jan 2020 07:42 )             26.6       01-22    143  |  103  |  11  ----------------------------<  145<H>  3.4<L>   |  26  |  0.9    Ca    8.6      22 Jan 2020 07:42    TPro  6.4  /  Alb  3.7  /  TBili  0.9  /  DBili  x   /  AST  32  /  ALT  12  /  AlkPhos  76  01-22    LIVER FUNCTIONS - ( 22 Jan 2020 07:42 )  Alb: 3.7 g/dL / Pro: 6.4 g/dL / ALK PHOS: 76 U/L / ALT: 12 U/L / AST: 32 U/L / GGT: x               Drug Dosing Weight  Height (cm): 162.6 (20 Jan 2020 16:07)  Weight (kg): 110.6 (20 Jan 2020 16:07)  BMI (kg/m2): 41.8 (20 Jan 2020 16:07)  BSA (m2): 2.13 (20 Jan 2020 16:07)        RADIOLOGY & ADDITIONAL TESTS:  Imaging Personally Reviewed:  [x] YES  [ ] NO    HEALTH ISSUES - PROBLEM Dx:  Breast cancer in female: Breast cancer in female  Brain aneurysm: Brain aneurysm  Dementia: Dementia  Hyperlipidemia, unspecified hyperlipidemia type: Hyperlipidemia, unspecified hyperlipidemia type  Depression, unspecified depression type: Depression, unspecified depression type  Seizures: Seizures  Anemia: Anemia          MEDS:  bisacodyl 5 milliGRAM(s) Oral daily PRN  chlorhexidine 4% Liquid 1 Application(s) Topical <User Schedule>  escitalopram 10 milliGRAM(s) Oral daily  lamoTRIgine 100 milliGRAM(s) Oral two times a day  memantine 10 milliGRAM(s) Oral two times a day  ondansetron Injectable 4 milliGRAM(s) IV Push every 6 hours PRN  pantoprazole Infusion 8 mG/Hr IV Continuous <Continuous>  QUEtiapine 25 milliGRAM(s) Oral two times a day  senna 2 Tablet(s) Oral at bedtime PRN  simvastatin 20 milliGRAM(s) Oral at bedtime

## 2020-01-22 NOTE — PROGRESS NOTE ADULT - ASSESSMENT
A 68 yo F with PMHx of breast cancer, dementia, depression, seizures, h/o CVA x2 and Cerebral Aneurysm, anemia hgb 5.9 on 1/19 s/p  transfusion  at that time brought in for abnormal labs by pts daughter as per EMS. Pt found in ed with severe anemia hgb 3.7. Pt hemodynamically stable. A 70 yo F with PMHx of breast cancer, dementia, depression, seizures, h/o CVA x2 and Cerebral Aneurysm, anemia hgb 5.9 on 1/19 s/p  transfusion  at that time brought in for abnormal labs by pts daughter as per EMS. Pt found in ed with severe anemia hgb 3.7. Pt hemodynamically stable.             Progress Note Handoff  Pending Consults: Gi follow up  Pending Tests: none  Pending Results: cbc  Family Discussion: attempted to call pt's family - no answer   Disposition: Home_____/SNF______/Other_____/Unknown at this time_x____    Please call me with any questions at extension [phone]

## 2020-01-22 NOTE — PROGRESS NOTE ADULT - ASSESSMENT
69yFemale pmh CVA x2 and cerebral aneurysm, anemia hgb 5.9 on 1/2019. Patient has dementia alert and oriented times 2 to person and place. Patient EGD/Colonoscopy 1/2019 significant for duodenitis, non erosive gastritis, and diverticulosis and 3 polyps were removed. Biopsies showed sessile serrated non dysplastic polyp and one polyp was tubular adenoma.    Problem 1-Anemia microcytic patient refusing GI work-up and refusing even a rectal exam  Rec  -Maintain Hemodynamic Stability   -Monitor CBC  -CMP,Optimize Electrolytes  -PT,PTT,INR  -Monitor H and H  -Transfuse prn to hgb >8  -Continue PPI BID  -Monitor Vital Signs  -Monitor Stool For blood, frequency, consistency, melena  -Active Type and Screen  -The benefits of endoscopy and colonoscopy as well as the risks, such as GI bleeding, aspiration pneumonia, perforation, damage or loss of teeth, reaction to medication, or death were reviewed with the patient again. Patient states she does not care and she will not consent to endoscopy or colonoscopy and again suggested I leave her alone.   -Currently at a stalemate as for colonoscopy part patient will need to prep and she has stated multiple times she will not prep for colonoscopy. Patient during her last admission had her colonoscopy cancelled multiple times as she refused to drink her colonoscopy prep

## 2020-01-22 NOTE — CHART NOTE - NSCHARTNOTEFT_GEN_A_CORE
Pt has right arm precautions secondary to hx of mastectomy and it is difficult placing an IV, attempts unsuccessful by staff.  Pt is receiving protonix drip and IVF, d5 and 0.45ns.    A call to pharmacy to see what other fluids are compatible with the protonix drip.  They are LR, NS and D5.  Will d/c D501/2NS and start LR at 50cc/hr along with continuing protonix drip.

## 2020-01-22 NOTE — PROGRESS NOTE ADULT - PROBLEM SELECTOR PLAN 5
continue with home medications  sitter consult  Psych consult for agitation continue with home medications  Psych consult appreciated - pt lacks capacity for medical decision making  continue 1:1 sit

## 2020-01-22 NOTE — PROGRESS NOTE ADULT - PROBLEM SELECTOR PLAN 1
-S/P 4 units of RBC transfusion.  Repeat CBC after transfusion pending  -Monitor H/H  -Iron study shows iron def anemia.  B12, and folate level ok  -Patient refuses procedure, discussed case with daughter who is willing to try to talk to her to convince her with procedure  -previous EGD/Colonoscopy reviewed  -IV PPI drip  -GI to follow up -S/P 4 units of RBC transfusion.  Repeat CBC after transfusion is acceptable  -Monitor H/H  -Iron study shows iron def anemia.  B12, and folate level ok  -Patient refuses procedure, hospitalist on 1/21 discussed case with daughter who is willing to try to talk to her to convince her with procedure.  Unable to reach family today   -previous EGD/Colonoscopy reviewed  -IV PPI drip  -GI to follow up pending   -pt had recent hospitalization for the same  -NPO, start IVF

## 2020-01-22 NOTE — PROGRESS NOTE ADULT - SUBJECTIVE AND OBJECTIVE BOX
69yFemale  Being followed for anemia no gross GI bleeding   Interval history: Patient denies nausea, vomiting, hematemesis, melena, blood in stool, diarrhea, constipation, abdominal pain.      PAST MEDICAL & SURGICAL HISTORY:   Brain aneurysm  Seizures  Depression, unspecified depression type  Hyperlipidemia, unspecified hyperlipidemia type  Dementia  Breast cancer in female  H/O abdominal hysterectomy  H/O mastectomy, left          Social History: No smoking. No alcohol. No illegal drug use.          MEDICATIONS:  MEDICATIONS  (STANDING):  chlorhexidine 4% Liquid 1 Application(s) Topical <User Schedule>  dextrose 5% + sodium chloride 0.45%. 1000 milliLiter(s) (50 mL/Hr) IV Continuous <Continuous>  escitalopram 10 milliGRAM(s) Oral daily  lamoTRIgine 100 milliGRAM(s) Oral two times a day  memantine 10 milliGRAM(s) Oral two times a day  pantoprazole Infusion 8 mG/Hr (10 mL/Hr) IV Continuous <Continuous>  potassium chloride    Tablet ER 40 milliEquivalent(s) Oral every 4 hours  QUEtiapine 25 milliGRAM(s) Oral two times a day  simvastatin 20 milliGRAM(s) Oral at bedtime    MEDICATIONS  (PRN):  bisacodyl 5 milliGRAM(s) Oral daily PRN Constipation  ondansetron Injectable 4 milliGRAM(s) IV Push every 6 hours PRN Nausea  senna 2 Tablet(s) Oral at bedtime PRN Constipation      Allergies:     No Known Allergies            REVIEW OF SYSTEMS:  General:  No weight loss, fevers, or chills.  Eyes:  No reported pain or visual changes  ENT:  No sore throat or runny nose.  NECK: No stiffness   CV:  No chest pain or palpitations.  Resp:  No shortness of breath, cough  GI:  No abdominal pain, nausea, vomiting, dysphagia, diarrhea or constipation. No rectal bleeding, melena, or hematemesis.  Muscle:  No aches or weakness  Neuro:  No tingling, numbness           VITAL SIGNS:   T(F): 99.5 (01-22-20 @ 13:42), Max: 99.5 (01-22-20 @ 13:42)  HR: 63 (01-22-20 @ 13:42) (63 - 67)  BP: 118/56 (01-22-20 @ 13:42) (115/52 - 128/62)  RR: 16 (01-22-20 @ 13:42) (16 - 16)  SpO2: --    PHYSICAL EXAM:  GENERAL: AAOx3, no acute distress.  HEAD:  Atraumatic, Normocephalic  EYES: conjunctiva and sclera clear  NECK: Supple, no JVD or thyromegaly  CHEST/LUNG: Clear to auscultation bilaterally; No wheeze, rhonchi, or rales  HEART: Regular rate and rhythm; normal S1, S2, No murmurs.  ABDOMEN: Soft, nontender, nondistended; Bowel sounds present, no abdominal bruit, masses, or hepatosplenomegaly  NEUROLOGY: No asterixis or tremor.   SKIN: Intact, no jaundice            LABS:                        8.1    5.93  )-----------( 151      ( 22 Jan 2020 07:42 )             26.6     01-22    143  |  103  |  11  ----------------------------<  145<H>  3.4<L>   |  26  |  0.9    Ca    8.6      22 Jan 2020 07:42    TPro  6.4  /  Alb  3.7  /  TBili  0.9  /  DBili  x   /  AST  32  /  ALT  12  /  AlkPhos  76  01-22    LIVER FUNCTIONS - ( 22 Jan 2020 07:42 )  Alb: 3.7 g/dL / Pro: 6.4 g/dL / ALK PHOS: 76 U/L / ALT: 12 U/L / AST: 32 U/L / GGT: x               IMAGING:      < from: CT Head No Cont (03.14.19 @ 13:24) >  EXAM:  CT BRAIN            PROCEDURE DATE:  03/14/2019            INTERPRETATION:  Clinical History / Reason for exam:  Altered mental   status.    Technique:  Multiple contiguous axial CT images of the brain were   obtained from base to vertex without administration of intravenous   contrast.      Comparison: CT head 1/1/2019. MRI of the brain 1/3/2019    Findings:      The cortical sulci and ventricular system are symmetrically prominent   consistent with age related cerebral volume loss.    Small chronic left cerebellar hemisphere infarction again seen. There are   scattered subcortical and periventricular hypodensities without mass   effect most consistent with chronic microvascular ischemic changes.    There is no mass effect, midline shift or intracranial hemorrhage.      Redemonstrated calcific atherosclerotic disease at the skull base   prominent vascular calcifications of the left MCA.    The bones are intact without depressed skull fracture.    The visualized paranasal sinusesare unremarkable.  The mastoid air cells   are well aerated.      Impression:      No evidence of acute intracranial pathology.    Moderate to severe chronic microvascular ischemic changes, not   significantly changed. Small chronic left cerebellar infarction.                  ALVERTO COLLINS M.D., ATTENDING RADIOLOGIST  This document has been electronically signed. Mar 14 2019  1:35PM              < end of copied text > 69yFemale  Being followed for anemia no gross GI bleeding   Interval history: Patient denies nausea, vomiting, hematemesis, melena, blood in stool, diarrhea, constipation, abdominal pain.      PAST MEDICAL & SURGICAL HISTORY:   Brain aneurysm  Seizures  Depression, unspecified depression type  Hyperlipidemia, unspecified hyperlipidemia type  Dementia  Breast cancer in female  H/O abdominal hysterectomy  H/O mastectomy, left          Social History: No smoking. No alcohol. No illegal drug use.          MEDICATIONS:  MEDICATIONS  (STANDING):  chlorhexidine 4% Liquid 1 Application(s) Topical <User Schedule>  dextrose 5% + sodium chloride 0.45%. 1000 milliLiter(s) (50 mL/Hr) IV Continuous <Continuous>  escitalopram 10 milliGRAM(s) Oral daily  lamoTRIgine 100 milliGRAM(s) Oral two times a day  memantine 10 milliGRAM(s) Oral two times a day  pantoprazole Infusion 8 mG/Hr (10 mL/Hr) IV Continuous <Continuous>  potassium chloride    Tablet ER 40 milliEquivalent(s) Oral every 4 hours  QUEtiapine 25 milliGRAM(s) Oral two times a day  simvastatin 20 milliGRAM(s) Oral at bedtime    MEDICATIONS  (PRN):  bisacodyl 5 milliGRAM(s) Oral daily PRN Constipation  ondansetron Injectable 4 milliGRAM(s) IV Push every 6 hours PRN Nausea  senna 2 Tablet(s) Oral at bedtime PRN Constipation      Allergies:     No Known Allergies            REVIEW OF SYSTEMS:  General:  No weight loss, fevers, or chills.  Eyes:  No reported pain or visual changes  ENT:  No sore throat or runny nose.  NECK: No stiffness   CV:  No chest pain or palpitations.  Resp:  No shortness of breath, cough  GI:  No abdominal pain, nausea, vomiting, dysphagia, diarrhea or constipation. No rectal bleeding, melena, or hematemesis.  Muscle:  No aches or weakness  Neuro:  No tingling, numbness           VITAL SIGNS:   T(F): 99.5 (01-22-20 @ 13:42), Max: 99.5 (01-22-20 @ 13:42)  HR: 63 (01-22-20 @ 13:42) (63 - 67)  BP: 118/56 (01-22-20 @ 13:42) (115/52 - 128/62)  RR: 16 (01-22-20 @ 13:42) (16 - 16)  SpO2: --    PHYSICAL EXAM:  GENERAL: AAOx2 to person and place, no acute distress.  HEAD:  Atraumatic, Normocephalic  EYES: conjunctiva and sclera clear  NECK: Supple, no JVD or thyromegaly  CHEST/LUNG: Clear to auscultation bilaterally; No wheeze, rhonchi, or rales  HEART: Regular rate and rhythm; normal S1, S2, No murmurs.  ABDOMEN: Soft, nontender, nondistended; Bowel sounds present, no abdominal bruit, masses, or hepatosplenomegaly  NEUROLOGY: No asterixis or tremor.   SKIN: Intact, no jaundice            LABS:                        8.1    5.93  )-----------( 151      ( 22 Jan 2020 07:42 )             26.6     01-22    143  |  103  |  11  ----------------------------<  145<H>  3.4<L>   |  26  |  0.9    Ca    8.6      22 Jan 2020 07:42    TPro  6.4  /  Alb  3.7  /  TBili  0.9  /  DBili  x   /  AST  32  /  ALT  12  /  AlkPhos  76  01-22    LIVER FUNCTIONS - ( 22 Jan 2020 07:42 )  Alb: 3.7 g/dL / Pro: 6.4 g/dL / ALK PHOS: 76 U/L / ALT: 12 U/L / AST: 32 U/L / GGT: x               IMAGING:      < from: CT Head No Cont (03.14.19 @ 13:24) >  EXAM:  CT BRAIN            PROCEDURE DATE:  03/14/2019            INTERPRETATION:  Clinical History / Reason for exam:  Altered mental   status.    Technique:  Multiple contiguous axial CT images of the brain were   obtained from base to vertex without administration of intravenous   contrast.      Comparison: CT head 1/1/2019. MRI of the brain 1/3/2019    Findings:      The cortical sulci and ventricular system are symmetrically prominent   consistent with age related cerebral volume loss.    Small chronic left cerebellar hemisphere infarction again seen. There are   scattered subcortical and periventricular hypodensities without mass   effect most consistent with chronic microvascular ischemic changes.    There is no mass effect, midline shift or intracranial hemorrhage.      Redemonstrated calcific atherosclerotic disease at the skull base   prominent vascular calcifications of the left MCA.    The bones are intact without depressed skull fracture.    The visualized paranasal sinusesare unremarkable.  The mastoid air cells   are well aerated.      Impression:      No evidence of acute intracranial pathology.    Moderate to severe chronic microvascular ischemic changes, not   significantly changed. Small chronic left cerebellar infarction.                  ALVERTO COLLINS M.D., ATTENDING RADIOLOGIST  This document has been electronically signed. Mar 14 2019  1:35PM              < end of copied text >

## 2020-01-22 NOTE — PROGRESS NOTE ADULT - PROBLEM SELECTOR PROBLEM 4
Patient needs a refill of the following prescription:    pregabalin (LYRICA) 100 mg capsule     Last script was prescribed at his discharge from the hospital     Please submit to [id number] [facility] in [place]--should be OK to fill so long as his last fill was 11/1/18 as per the task on 11/2/18--    Thank you Hyperlipidemia, unspecified hyperlipidemia type

## 2020-01-23 LAB
ALBUMIN SERPL ELPH-MCNC: 3.6 G/DL — SIGNIFICANT CHANGE UP (ref 3.5–5.2)
ALP SERPL-CCNC: 71 U/L — SIGNIFICANT CHANGE UP (ref 30–115)
ALT FLD-CCNC: 13 U/L — SIGNIFICANT CHANGE UP (ref 0–41)
ANION GAP SERPL CALC-SCNC: 15 MMOL/L — HIGH (ref 7–14)
AST SERPL-CCNC: 26 U/L — SIGNIFICANT CHANGE UP (ref 0–41)
BILIRUB SERPL-MCNC: 0.8 MG/DL — SIGNIFICANT CHANGE UP (ref 0.2–1.2)
BLD GP AB SCN SERPL QL: SIGNIFICANT CHANGE UP
BUN SERPL-MCNC: 10 MG/DL — SIGNIFICANT CHANGE UP (ref 10–20)
CALCIUM SERPL-MCNC: 8.8 MG/DL — SIGNIFICANT CHANGE UP (ref 8.5–10.1)
CHLORIDE SERPL-SCNC: 106 MMOL/L — SIGNIFICANT CHANGE UP (ref 98–110)
CO2 SERPL-SCNC: 24 MMOL/L — SIGNIFICANT CHANGE UP (ref 17–32)
CREAT SERPL-MCNC: 0.8 MG/DL — SIGNIFICANT CHANGE UP (ref 0.7–1.5)
GLUCOSE BLDC GLUCOMTR-MCNC: 147 MG/DL — HIGH (ref 70–99)
GLUCOSE SERPL-MCNC: 133 MG/DL — HIGH (ref 70–99)
HCT VFR BLD CALC: 26.7 % — LOW (ref 37–47)
HGB BLD-MCNC: 7.9 G/DL — LOW (ref 12–16)
MAGNESIUM SERPL-MCNC: 2 MG/DL — SIGNIFICANT CHANGE UP (ref 1.8–2.4)
MCHC RBC-ENTMCNC: 24.2 PG — LOW (ref 27–31)
MCHC RBC-ENTMCNC: 29.6 G/DL — LOW (ref 32–37)
MCV RBC AUTO: 81.9 FL — SIGNIFICANT CHANGE UP (ref 81–99)
NRBC # BLD: 0 /100 WBCS — SIGNIFICANT CHANGE UP (ref 0–0)
PLATELET # BLD AUTO: 150 K/UL — SIGNIFICANT CHANGE UP (ref 130–400)
POTASSIUM SERPL-MCNC: 3.9 MMOL/L — SIGNIFICANT CHANGE UP (ref 3.5–5)
POTASSIUM SERPL-SCNC: 3.9 MMOL/L — SIGNIFICANT CHANGE UP (ref 3.5–5)
PROT SERPL-MCNC: 6.2 G/DL — SIGNIFICANT CHANGE UP (ref 6–8)
RBC # BLD: 3.26 M/UL — LOW (ref 4.2–5.4)
RBC # FLD: 19.2 % — HIGH (ref 11.5–14.5)
SODIUM SERPL-SCNC: 145 MMOL/L — SIGNIFICANT CHANGE UP (ref 135–146)
WBC # BLD: 5.43 K/UL — SIGNIFICANT CHANGE UP (ref 4.8–10.8)
WBC # FLD AUTO: 5.43 K/UL — SIGNIFICANT CHANGE UP (ref 4.8–10.8)

## 2020-01-23 PROCEDURE — 99232 SBSQ HOSP IP/OBS MODERATE 35: CPT

## 2020-01-23 PROCEDURE — 99233 SBSQ HOSP IP/OBS HIGH 50: CPT

## 2020-01-23 PROCEDURE — 99497 ADVNCD CARE PLAN 30 MIN: CPT | Mod: 25

## 2020-01-23 RX ADMIN — SIMVASTATIN 20 MILLIGRAM(S): 20 TABLET, FILM COATED ORAL at 21:16

## 2020-01-23 RX ADMIN — MEMANTINE HYDROCHLORIDE 10 MILLIGRAM(S): 10 TABLET ORAL at 06:17

## 2020-01-23 RX ADMIN — LAMOTRIGINE 100 MILLIGRAM(S): 25 TABLET, ORALLY DISINTEGRATING ORAL at 06:17

## 2020-01-23 RX ADMIN — LAMOTRIGINE 100 MILLIGRAM(S): 25 TABLET, ORALLY DISINTEGRATING ORAL at 17:04

## 2020-01-23 RX ADMIN — ESCITALOPRAM OXALATE 10 MILLIGRAM(S): 10 TABLET, FILM COATED ORAL at 13:16

## 2020-01-23 RX ADMIN — MEMANTINE HYDROCHLORIDE 10 MILLIGRAM(S): 10 TABLET ORAL at 17:04

## 2020-01-23 RX ADMIN — CHLORHEXIDINE GLUCONATE 1 APPLICATION(S): 213 SOLUTION TOPICAL at 06:17

## 2020-01-23 RX ADMIN — QUETIAPINE FUMARATE 25 MILLIGRAM(S): 200 TABLET, FILM COATED ORAL at 06:17

## 2020-01-23 RX ADMIN — QUETIAPINE FUMARATE 25 MILLIGRAM(S): 200 TABLET, FILM COATED ORAL at 17:04

## 2020-01-23 NOTE — PROGRESS NOTE ADULT - ASSESSMENT
69yFemale pmh CVA x2 and cerebral aneurysm, anemia hgb 5.9 on 1/2019. Patient has dementia alert and oriented times 2 to person and place. Patient EGD/Colonoscopy 1/2019 significant for duodenitis, non erosive gastritis, and diverticulosis and 3 polyps were removed. Biopsies showed sessile serrated non dysplastic polyp and one polyp was tubular adenoma.    Problem 1-Anemia microcytic patient refusing GI work-up and refusing even a rectal exam  Rec  -Maintain Hemodynamic Stability   -Monitor CBC  -CMP,Optimize Electrolytes  -PT,PTT,INR  -Monitor H and H  -Transfuse prn to hgb >8  -Continue PPI BID  -Monitor Vital Signs  -Monitor Stool For blood, frequency, consistency, melena  -Active Type and Screen  -The benefits of endoscopy and colonoscopy as well as the risks, such as GI bleeding, aspiration pneumonia, perforation, damage or loss of teeth, reaction to medication, or death were reviewed with the patient again. Patient states she does not care and she will not consent to endoscopy or colonoscopy  -Currently at a stalemate still as for colonoscopy part patient will need to prep and she has stated multiple times she will not prep for colonoscopy. Patient during her last admission had her colonoscopy cancelled multiple times as she refused to drink her colonoscopy prep  -Today I suggested colonoscopy and patient laughed at me and told me to leave again 69yFemale pmh CVA x2 and cerebral aneurysm, anemia hgb 5.9 on 1/2019. Patient has dementia alert and oriented times 2 to person and place. Patient EGD/Colonoscopy 1/2019 significant for duodenitis, non erosive gastritis, and diverticulosis and 3 polyps were removed. Biopsies showed sessile serrated non dysplastic polyp and one polyp was tubular adenoma.    Problem 1-Anemia microcytic patient refusing GI work-up and refusing even a rectal exam  Rec  -Maintain Hemodynamic Stability   -Monitor CBC  -CMP,Optimize Electrolytes  -PT,PTT,INR  -Monitor H and H  -Transfuse prn to hgb >8  -Continue PPI BID  -Monitor Vital Signs  -Monitor Stool For blood, frequency, consistency, melena  -Active Type and Screen  -The benefits of endoscopy and colonoscopy as well as the risks, such as GI bleeding, aspiration pneumonia, perforation, damage or loss of teeth, reaction to medication, or death were reviewed with the patient again. Patient states she does not care and she will not consent to endoscopy or colonoscopy  -Currently at a stalemate still as for colonoscopy part patient will need to prep and she has stated multiple times she will not prep for colonoscopy. Patient during her last admission had her colonoscopy cancelled multiple times as she refused to drink her colonoscopy prep  -Today I suggested colonoscopy and patient laughed at me and told me to leave again  -Follow up with our [facility] located at [address] [place] [address]. Phone Number: [phone] for outpatient EGD/Colonoscopy

## 2020-01-23 NOTE — GOALS OF CARE CONVERSATION - ADVANCED CARE PLANNING - CONVERSATION DETAILS
A 68 yo F with PMHx of breast cancer, dementia, depression, seizures, h/o CVA x2 and Cerebral Aneurysm, anemia hgb 5.9 on 1/19 s/p  transfusion  at that time brought in for abnormal labs as per electronic medical records.    Pt found to have acute on chronic anemia with a HGB of 3.7  Patient transfused 4 units.  No capacity to make medical decisions due to dementia.    Discussed diagnosis and prognosis with the pt's daughter.  Hospice consult placed.  DNR/DNI signed

## 2020-01-23 NOTE — PROGRESS NOTE ADULT - SUBJECTIVE AND OBJECTIVE BOX
69yFemale  Being followed for Anemia no overt signs of GI bleeding   Interval history: Patient denies nausea, vomiting, hematemesis, melena, blood in stool, diarrhea, constipation, abdominal pain.      PAST MEDICAL & SURGICAL HISTORY:  Brain aneurysm  Seizures  Depression, unspecified depression type  Hyperlipidemia, unspecified hyperlipidemia type  Dementia  Breast cancer in female  H/O abdominal hysterectomy  H/O mastectomy, left          Social History: No smoking. No alcohol. No illegal drug use.          MEDICATIONS:  MEDICATIONS  (STANDING):  chlorhexidine 4% Liquid 1 Application(s) Topical <User Schedule>  escitalopram 10 milliGRAM(s) Oral daily  lactated ringers. 1000 milliLiter(s) (50 mL/Hr) IV Continuous <Continuous>  lamoTRIgine 100 milliGRAM(s) Oral two times a day  memantine 10 milliGRAM(s) Oral two times a day  pantoprazole Infusion 8 mG/Hr (10 mL/Hr) IV Continuous <Continuous>  QUEtiapine 25 milliGRAM(s) Oral two times a day  simvastatin 20 milliGRAM(s) Oral at bedtime    MEDICATIONS  (PRN):  bisacodyl 5 milliGRAM(s) Oral daily PRN Constipation  ondansetron Injectable 4 milliGRAM(s) IV Push every 6 hours PRN Nausea  senna 2 Tablet(s) Oral at bedtime PRN Constipation      Allergies:     No Known Allergies              REVIEW OF SYSTEMS:  General:  No weight loss, fevers, or chills.  Eyes:  No reported pain or visual changes  ENT:  No sore throat or runny nose.  NECK: No stiffness   CV:  No chest pain or palpitations.  Resp:  No shortness of breath, cough  GI:  No abdominal pain, nausea, vomiting, dysphagia, diarrhea or constipation. No rectal bleeding, melena, or hematemesis.  Muscle:  No aches or weakness  Neuro:  No tingling, numbness         VITAL SIGNS:   T(F): 99.7 (01-23-20 @ 06:14), Max: 99.7 (01-23-20 @ 06:14)  HR: 73 (01-23-20 @ 06:14) (63 - 73)  BP: 137/70 (01-23-20 @ 06:14) (118/56 - 137/70)  RR: 15 (01-23-20 @ 06:14) (15 - 16)  SpO2: --    PHYSICAL EXAM:  GENERAL: AAOx2 to person and place, no acute distress.  HEAD:  Atraumatic, Normocephalic  EYES: conjunctiva and sclera clear  NECK: Supple, no JVD or thyromegaly  CHEST/LUNG: Clear to auscultation bilaterally; No wheeze, rhonchi, or rales  HEART: Regular rate and rhythm; normal S1, S2, No murmurs.  ABDOMEN: Soft, nontender, nondistended; Bowel sounds present, no abdominal bruit, masses, or hepatosplenomegaly  NEUROLOGY: No asterixis or tremor.   SKIN: Intact, no jaundice            LABS:                        7.9    5.43  )-----------( 150      ( 23 Jan 2020 07:10 )             26.7     01-23    145  |  106  |  10  ----------------------------<  133<H>  3.9   |  24  |  0.8    Ca    8.8      23 Jan 2020 07:10  Mg     2.0     01-23    TPro  6.2  /  Alb  3.6  /  TBili  0.8  /  DBili  x   /  AST  26  /  ALT  13  /  AlkPhos  71  01-23    LIVER FUNCTIONS - ( 23 Jan 2020 07:10 )  Alb: 3.6 g/dL / Pro: 6.2 g/dL / ALK PHOS: 71 U/L / ALT: 13 U/L / AST: 26 U/L / GGT: x               IMAGING:      < from: CT Head No Cont (03.14.19 @ 13:24) >    EXAM:  CT BRAIN            PROCEDURE DATE:  03/14/2019            INTERPRETATION:  Clinical History / Reason for exam:  Altered mental   status.    Technique:  Multiple contiguous axial CT images of the brain were   obtained from base to vertex without administration of intravenous   contrast.      Comparison: CT head 1/1/2019. MRI of the brain 1/3/2019    Findings:      The cortical sulci and ventricular system are symmetrically prominent   consistent with age related cerebral volume loss.    Small chronic left cerebellar hemisphere infarction again seen. There are   scattered subcortical and periventricular hypodensities without mass   effect most consistent with chronic microvascular ischemic changes.    There is no mass effect, midline shift or intracranial hemorrhage.      Redemonstrated calcific atherosclerotic disease at the skull base   prominent vascular calcifications of the left MCA.    The bones are intact without depressed skull fracture.    The visualized paranasal sinusesare unremarkable.  The mastoid air cells   are well aerated.      Impression:      No evidence of acute intracranial pathology.    Moderate to severe chronic microvascular ischemic changes, not   significantly changed. Small chronic left cerebellar infarction.                  ALVERTO COLLINS M.D., ATTENDING RADIOLOGIST  This document has been electronically signed. Mar 14 2019  1:35PM              < end of copied text >

## 2020-01-23 NOTE — PROGRESS NOTE ADULT - SUBJECTIVE AND OBJECTIVE BOX
KD SONYA  69y  Female      Patient is a 69y old Female who presents with anemia (21 Jan 2020 10:52)      INTERVAL HPI/OVERNIGHT EVENTS:  Patient seen and examined earlier this morning.  Sitting in the chair on a 1:1 sit with her brother bedside.  Denies any complaints.  Pt is demented.   Discussed care with her daughter and GI PA - will try to proceed with EGD/Colonoscopy   Discussed case with pt's daughterAshley - [phone]      REVIEW OF SYSTEMS:  unable to assess due to dementia      Vital Signs Last 24 Hrs  T(C): 35.7 (23 Jan 2020 14:06), Max: 37.6 (23 Jan 2020 06:14)  T(F): 96.2 (23 Jan 2020 14:06), Max: 99.7 (23 Jan 2020 06:14)  HR: 86 (23 Jan 2020 14:06) (70 - 86)  BP: 140/86 (23 Jan 2020 14:06) (119/56 - 140/86)  BP(mean): --  RR: 16 (23 Jan 2020 14:06) (15 - 16)  SpO2: --      PHYSICAL EXAM:  GENERAL: NAD, well-groomed, well-developed  HEAD:  Atraumatic, Normocephalic  EYES: EOMI, PERRLA, conjunctiva and sclera clear  ENMT: No tonsillar erythema, exudates, or enlargement; Moist mucous membranes, Good dentition, No lesions  NECK: Supple, No JVD, Normal thyroid  NERVOUS SYSTEM:  awake/alert; confused   CHEST/LUNG: Clear to percussion bilaterally; No rales, rhonchi, wheezing, or rubs  HEART: Regular rate and rhythm; No murmurs, rubs, or gallops  ABDOMEN: Soft, Nontender, Nondistended; Bowel sounds present  EXTREMITIES:  2+ Peripheral Pulses, No clubbing, cyanosis, or edema  LYMPH: No lymphadenopathy noted  SKIN: No rashes or lesions    Consultant(s) Notes Reviewed:  [x ] YES  [ ] NO  Care Discussed with Consultants/Other Providers [ x] YES  [ ] NO    LAB:                              7.9    5.43  )-----------( 150      ( 23 Jan 2020 07:10 )             26.7     01-23    145  |  106  |  10  ----------------------------<  133<H>  3.9   |  24  |  0.8    Ca    8.8      23 Jan 2020 07:10  Mg     2.0     01-23    TPro  6.2  /  Alb  3.6  /  TBili  0.8  /  DBili  x   /  AST  26  /  ALT  13  /  AlkPhos  71  01-23        Drug Dosing Weight  Height (cm): 162.6 (20 Jan 2020 16:07)  Weight (kg): 110.6 (20 Jan 2020 16:07)  BMI (kg/m2): 41.8 (20 Jan 2020 16:07)  BSA (m2): 2.13 (20 Jan 2020 16:07)        RADIOLOGY & ADDITIONAL TESTS:  Imaging Personally Reviewed:  [x] YES  [ ] NO    HEALTH ISSUES - PROBLEM Dx:  Breast cancer in female: Breast cancer in female  Brain aneurysm: Brain aneurysm  Dementia: Dementia  Hyperlipidemia, unspecified hyperlipidemia type: Hyperlipidemia, unspecified hyperlipidemia type  Depression, unspecified depression type: Depression, unspecified depression type  Seizures: Seizures  Anemia: Anemia        MEDICATIONS  (STANDING):  chlorhexidine 4% Liquid 1 Application(s) Topical <User Schedule>  escitalopram 10 milliGRAM(s) Oral daily  lactated ringers. 1000 milliLiter(s) (50 mL/Hr) IV Continuous <Continuous>  lamoTRIgine 100 milliGRAM(s) Oral two times a day  memantine 10 milliGRAM(s) Oral two times a day  pantoprazole Infusion 8 mG/Hr (10 mL/Hr) IV Continuous <Continuous>  QUEtiapine 25 milliGRAM(s) Oral two times a day  simvastatin 20 milliGRAM(s) Oral at bedtime    MEDICATIONS  (PRN):  bisacodyl 5 milliGRAM(s) Oral daily PRN Constipation  ondansetron Injectable 4 milliGRAM(s) IV Push every 6 hours PRN Nausea  senna 2 Tablet(s) Oral at bedtime PRN Constipation

## 2020-01-23 NOTE — PROGRESS NOTE ADULT - ASSESSMENT
A 68 yo F with PMHx of breast cancer, dementia, depression, seizures, h/o CVA x2 and Cerebral Aneurysm, anemia hgb 5.9 on 1/19 s/p  transfusion  at that time brought in for abnormal labs by pts daughter as per EMS. Pt found in ed with severe anemia hgb 3.7. Pt hemodynamically stable.     Acute on chronic Anemia  -S/P 4 units of RBC transfusion.  Repeat CBC after transfusion is acceptable  -Monitor H/H  -Iron study shows iron def anemia.  B12, and folate level ok  -Patient refuses procedure but has no capacity for medical decision making   -discussed plan with pt's daughter, Ashley, at length - she is open to speaking to Hospice   -previous EGD/Colonoscopy reviewed - polyps; terminal ilium wasn't probed; esophagitis, gastritis, dutton's esophagus  -IV PPI drip  -start clears; continue IVF  -GI to follow up appreciated        Problem/Plan - 2:  ·  Problem: Seizures.  Plan: continue lamotrigine.      Problem/Plan - 3:  ·  Problem: Depression, unspecified depression type.  Plan: continue home meds quetiapine, escitalopram.      Problem/Plan - 4:  ·  Problem: Hyperlipidemia, unspecified hyperlipidemia type.  Plan: continue statin.     Problem/Plan - 5:  ·  Problem: Dementia.  Plan: continue with home medications  Psych consult appreciated - pt lacks capacity for medical decision making  continue 1:1 sit.     Problem/Plan - 6:  Problem: Hypokalemia. Plan: resolved    DNR/DNI as per pt's daughter         Progress Note Handoff  Pending Consults: Hospice   Pending Tests: none  Pending Results: cbc  Family Discussion: discussed EGD/C-scope as well as hospice with pt's daughter at length - in agreement    Disposition: Home_____/SNF______/Other_____/Unknown at this time_x____    Please call me with any questions at extension [phone] A 68 yo F with PMHx of breast cancer, dementia, depression, seizures, h/o CVA x2 and Cerebral Aneurysm, anemia hgb 5.9 on 1/19 s/p  transfusion  at that time brought in for abnormal labs by pts daughter as per EMS. Pt found in ed with severe anemia hgb 3.7. Pt hemodynamically stable.     Acute on chronic Anemia  -S/P 4 units of RBC transfusion.  Repeat CBC after transfusion is acceptable  -Monitor H/H  -Iron study shows iron def anemia.  B12, and folate level ok  -Patient refuses procedure but has no capacity for medical decision making   -discussed plan with pt's daughter, Ashley, at length - she is open to speaking to Hospice   -previous EGD/Colonoscopy reviewed - polyps; terminal ilium wasn't probed; esophagitis, gastritis, dutton's esophagus  -IV PPI drip  -start clears; continue IVF  -GI to follow up appreciated        Problem/Plan - 2:  ·  Problem: Seizures.  Plan: continue lamotrigine.      Problem/Plan - 3:  ·  Problem: Depression, unspecified depression type.  Plan: continue home meds quetiapine, escitalopram.      Problem/Plan - 4:  ·  Problem: Hyperlipidemia, unspecified hyperlipidemia type.  Plan: continue statin.     Problem/Plan - 5:  ·  Problem: Dementia.  Plan: continue with home medications  Psych consult appreciated - pt lacks capacity for medical decision making  continue 1:1 sit.     Problem/Plan - 6:  Problem: Hypokalemia. Plan: resolved    #Morbid obesity  -diet and lifestyle modification     DNR/DNI as per pt's daughter         Progress Note Handoff  Pending Consults: Hospice   Pending Tests: none  Pending Results: cbc  Family Discussion: discussed EGD/C-scope as well as hospice with pt's daughter at length - in agreement    Disposition: Home_____/SNF______/Other_____/Unknown at this time_x____    Please call me with any questions at extension [phone]

## 2020-01-24 LAB
ALBUMIN SERPL ELPH-MCNC: 3.7 G/DL — SIGNIFICANT CHANGE UP (ref 3.5–5.2)
ALP SERPL-CCNC: 71 U/L — SIGNIFICANT CHANGE UP (ref 30–115)
ALT FLD-CCNC: 13 U/L — SIGNIFICANT CHANGE UP (ref 0–41)
ANION GAP SERPL CALC-SCNC: 13 MMOL/L — SIGNIFICANT CHANGE UP (ref 7–14)
AST SERPL-CCNC: 24 U/L — SIGNIFICANT CHANGE UP (ref 0–41)
BILIRUB SERPL-MCNC: 0.9 MG/DL — SIGNIFICANT CHANGE UP (ref 0.2–1.2)
BUN SERPL-MCNC: 8 MG/DL — LOW (ref 10–20)
CALCIUM SERPL-MCNC: 9 MG/DL — SIGNIFICANT CHANGE UP (ref 8.5–10.1)
CHLORIDE SERPL-SCNC: 105 MMOL/L — SIGNIFICANT CHANGE UP (ref 98–110)
CO2 SERPL-SCNC: 24 MMOL/L — SIGNIFICANT CHANGE UP (ref 17–32)
CREAT SERPL-MCNC: 0.8 MG/DL — SIGNIFICANT CHANGE UP (ref 0.7–1.5)
GLUCOSE SERPL-MCNC: 139 MG/DL — HIGH (ref 70–99)
HCT VFR BLD CALC: 27.6 % — LOW (ref 37–47)
HGB BLD-MCNC: 8.3 G/DL — LOW (ref 12–16)
MCHC RBC-ENTMCNC: 24.6 PG — LOW (ref 27–31)
MCHC RBC-ENTMCNC: 30.1 G/DL — LOW (ref 32–37)
MCV RBC AUTO: 81.7 FL — SIGNIFICANT CHANGE UP (ref 81–99)
NRBC # BLD: 0 /100 WBCS — SIGNIFICANT CHANGE UP (ref 0–0)
PLATELET # BLD AUTO: 157 K/UL — SIGNIFICANT CHANGE UP (ref 130–400)
POTASSIUM SERPL-MCNC: 4 MMOL/L — SIGNIFICANT CHANGE UP (ref 3.5–5)
POTASSIUM SERPL-SCNC: 4 MMOL/L — SIGNIFICANT CHANGE UP (ref 3.5–5)
PROT SERPL-MCNC: 6.5 G/DL — SIGNIFICANT CHANGE UP (ref 6–8)
RBC # BLD: 3.38 M/UL — LOW (ref 4.2–5.4)
RBC # FLD: 19.5 % — HIGH (ref 11.5–14.5)
SODIUM SERPL-SCNC: 142 MMOL/L — SIGNIFICANT CHANGE UP (ref 135–146)
WBC # BLD: 5.89 K/UL — SIGNIFICANT CHANGE UP (ref 4.8–10.8)
WBC # FLD AUTO: 5.89 K/UL — SIGNIFICANT CHANGE UP (ref 4.8–10.8)

## 2020-01-24 PROCEDURE — 99233 SBSQ HOSP IP/OBS HIGH 50: CPT

## 2020-01-24 RX ADMIN — SODIUM CHLORIDE 50 MILLILITER(S): 9 INJECTION, SOLUTION INTRAVENOUS at 11:47

## 2020-01-24 RX ADMIN — MEMANTINE HYDROCHLORIDE 10 MILLIGRAM(S): 10 TABLET ORAL at 17:15

## 2020-01-24 RX ADMIN — QUETIAPINE FUMARATE 25 MILLIGRAM(S): 200 TABLET, FILM COATED ORAL at 17:15

## 2020-01-24 RX ADMIN — LAMOTRIGINE 100 MILLIGRAM(S): 25 TABLET, ORALLY DISINTEGRATING ORAL at 17:15

## 2020-01-24 RX ADMIN — PANTOPRAZOLE SODIUM 10 MG/HR: 20 TABLET, DELAYED RELEASE ORAL at 15:37

## 2020-01-24 RX ADMIN — ESCITALOPRAM OXALATE 10 MILLIGRAM(S): 10 TABLET, FILM COATED ORAL at 11:47

## 2020-01-24 RX ADMIN — SIMVASTATIN 20 MILLIGRAM(S): 20 TABLET, FILM COATED ORAL at 21:50

## 2020-01-24 NOTE — PROGRESS NOTE ADULT - ASSESSMENT
A 68 yo F with PMHx of breast cancer, dementia, depression, seizures, h/o CVA x2 and Cerebral Aneurysm, anemia hgb 5.9 on 1/19 s/p  transfusion  at that time brought in for abnormal labs by pts daughter as per EMS. Pt found in ed with severe anemia hgb 3.7. Pt hemodynamically stable.     Acute on chronic Anemia  -S/P 4 units of RBC transfusion.  Repeat CBC after transfusion is acceptable  -Monitor H/H  -Iron study shows iron def anemia.  B12, and folate level ok  -Patient refuses procedure but has no capacity for medical decision making   -discussed plan with pt's daughter, Ashley, at length - she spoke to hospice and is interested in pursuing egd/c-scope in pt  -previous EGD/Colonoscopy reviewed - polyps; terminal ilium wasn't probed; esophagitis, gastritis, dutton's esophagus  -IV PPI drip  -on full liquids; continue IVF  -GI to follow up appreciated        Problem/Plan - 2:  ·  Problem: Seizures.  Plan: continue lamotrigine.      Problem/Plan - 3:  ·  Problem: Depression, unspecified depression type.  Plan: continue home meds quetiapine, escitalopram.      Problem/Plan - 4:  ·  Problem: Hyperlipidemia, unspecified hyperlipidemia type.  Plan: continue statin.     Problem/Plan - 5:  ·  Problem: Dementia.  Plan: continue with home medications  Psych consult appreciated - pt lacks capacity for medical decision making  continue 1:1 sit.     Problem/Plan - 6:  Problem: Hypokalemia. Plan: resolved    #Morbid obesity  -diet and lifestyle modification     DNR/DNI as per pt's daughter         Progress Note Handoff  Pending Consults: GI follow up  Pending Tests: none  Pending Results: cbc  Family Discussion: discussed EGD/C-scope as well as hospice with pt's daughter at length - in agreement    Disposition: Home_____/SNF______/Other_____/Unknown at this time_x____    Please call me with any questions at extension [phone]

## 2020-01-24 NOTE — PROGRESS NOTE ADULT - SUBJECTIVE AND OBJECTIVE BOX
RHETT YIETTA  69y  Female      Patient is a 69y old Female who presents with anemia (21 Jan 2020 10:52)      INTERVAL HPI/OVERNIGHT EVENTS:  Patient seen and examined earlier this morning.  Sitting in the chair on a 1:1 sit bedside.  Denies any complaints.  Pt is demented.   Discussed case with pt's daughter, Ashley - [phone] - she is interested in an inpatient EGD/Colonoscopy.  GI follow up pending - Dr. Meadows.        REVIEW OF SYSTEMS:  unable to assess due to dementia      Vital Signs Last 24 Hrs  T(C): 35.9 (24 Jan 2020 15:00), Max: 36.9 (23 Jan 2020 21:44)  T(F): 96.7 (24 Jan 2020 15:00), Max: 98.5 (23 Jan 2020 21:44)  HR: 60 (24 Jan 2020 15:00) (60 - 68)  BP: 134/78 (24 Jan 2020 15:00) (132/79 - 143/80)  BP(mean): --  RR: 17 (24 Jan 2020 15:00) (16 - 17)  SpO2: --      PHYSICAL EXAM:  GENERAL: NAD, well-groomed, well-developed  HEAD:  Atraumatic, Normocephalic  EYES: EOMI, PERRLA, conjunctiva and sclera clear  ENMT: No tonsillar erythema, exudates, or enlargement; Moist mucous membranes, Good dentition, No lesions  NECK: Supple, No JVD, Normal thyroid  NERVOUS SYSTEM:  awake/alert; confused   CHEST/LUNG: Clear to percussion bilaterally; No rales, rhonchi, wheezing, or rubs  HEART: Regular rate and rhythm; No murmurs, rubs, or gallops  ABDOMEN: Soft, Nontender, Nondistended; Bowel sounds present  EXTREMITIES:  2+ Peripheral Pulses, No clubbing, cyanosis, or edema  LYMPH: No lymphadenopathy noted  SKIN: No rashes or lesions        Consultant(s) Notes Reviewed:  [x ] YES  [ ] NO  Care Discussed with Consultants/Other Providers [ x] YES  [ ] NO    LAB:                         8.3    5.89  )-----------( 157      ( 24 Jan 2020 07:04 )             27.6       01-24    142  |  105  |  8<L>  ----------------------------<  139<H>  4.0   |  24  |  0.8    Ca    9.0      24 Jan 2020 07:04  Mg     2.0     01-23    TPro  6.5  /  Alb  3.7  /  TBili  0.9  /  DBili  x   /  AST  24  /  ALT  13  /  AlkPhos  71  01-24        Drug Dosing Weight  Height (cm): 162.6 (20 Jan 2020 16:07)  Weight (kg): 110.6 (20 Jan 2020 16:07)  BMI (kg/m2): 41.8 (20 Jan 2020 16:07)  BSA (m2): 2.13 (20 Jan 2020 16:07)        RADIOLOGY & ADDITIONAL TESTS:  Imaging Personally Reviewed:  [x] YES  [ ] NO      HEALTH ISSUES - PROBLEM Dx:  Breast cancer in female: Breast cancer in female  Brain aneurysm: Brain aneurysm  Dementia: Dementia  Hyperlipidemia, unspecified hyperlipidemia type: Hyperlipidemia, unspecified hyperlipidemia type  Depression, unspecified depression type: Depression, unspecified depression type  Seizures: Seizures  Anemia: Anemia        MEDICATIONS  (STANDING):  chlorhexidine 4% Liquid 1 Application(s) Topical <User Schedule>  escitalopram 10 milliGRAM(s) Oral daily  lactated ringers. 1000 milliLiter(s) (50 mL/Hr) IV Continuous <Continuous>  lamoTRIgine 100 milliGRAM(s) Oral two times a day  memantine 10 milliGRAM(s) Oral two times a day  pantoprazole Infusion 8 mG/Hr (10 mL/Hr) IV Continuous <Continuous>  QUEtiapine 25 milliGRAM(s) Oral two times a day  simvastatin 20 milliGRAM(s) Oral at bedtime    MEDICATIONS  (PRN):  bisacodyl 5 milliGRAM(s) Oral daily PRN Constipation  ondansetron Injectable 4 milliGRAM(s) IV Push every 6 hours PRN Nausea  senna 2 Tablet(s) Oral at bedtime PRN Constipation

## 2020-01-25 LAB
APPEARANCE UR: CLEAR — SIGNIFICANT CHANGE UP
BACTERIA # UR AUTO: ABNORMAL
BILIRUB UR-MCNC: ABNORMAL
COLOR SPEC: YELLOW — SIGNIFICANT CHANGE UP
DIFF PNL FLD: NEGATIVE — SIGNIFICANT CHANGE UP
EPI CELLS # UR: ABNORMAL /HPF
FLU A RESULT: NEGATIVE — SIGNIFICANT CHANGE UP
FLU A RESULT: NEGATIVE — SIGNIFICANT CHANGE UP
FLUAV AG NPH QL: NEGATIVE — SIGNIFICANT CHANGE UP
FLUBV AG NPH QL: NEGATIVE — SIGNIFICANT CHANGE UP
GLUCOSE UR QL: NEGATIVE MG/DL — SIGNIFICANT CHANGE UP
KETONES UR-MCNC: ABNORMAL
LEUKOCYTE ESTERASE UR-ACNC: NEGATIVE — SIGNIFICANT CHANGE UP
NITRITE UR-MCNC: NEGATIVE — SIGNIFICANT CHANGE UP
PH UR: 6 — SIGNIFICANT CHANGE UP (ref 5–8)
PROT UR-MCNC: ABNORMAL MG/DL
RSV RESULT: NEGATIVE — SIGNIFICANT CHANGE UP
RSV RNA RESP QL NAA+PROBE: NEGATIVE — SIGNIFICANT CHANGE UP
SP GR SPEC: 1.02 — SIGNIFICANT CHANGE UP (ref 1.01–1.03)
UROBILINOGEN FLD QL: 4 MG/DL (ref 0.2–0.2)

## 2020-01-25 PROCEDURE — 93971 EXTREMITY STUDY: CPT | Mod: 26,RT

## 2020-01-25 PROCEDURE — 99233 SBSQ HOSP IP/OBS HIGH 50: CPT

## 2020-01-25 PROCEDURE — 71045 X-RAY EXAM CHEST 1 VIEW: CPT | Mod: 26

## 2020-01-25 RX ORDER — CEPHALEXIN 500 MG
500 CAPSULE ORAL EVERY 6 HOURS
Refills: 0 | Status: DISCONTINUED | OUTPATIENT
Start: 2020-01-25 | End: 2020-01-25

## 2020-01-25 RX ORDER — CEFAZOLIN SODIUM 1 G
1000 VIAL (EA) INJECTION EVERY 6 HOURS
Refills: 0 | Status: DISCONTINUED | OUTPATIENT
Start: 2020-01-25 | End: 2020-01-28

## 2020-01-25 RX ADMIN — QUETIAPINE FUMARATE 25 MILLIGRAM(S): 200 TABLET, FILM COATED ORAL at 05:14

## 2020-01-25 RX ADMIN — Medication 500 MILLIGRAM(S): at 16:25

## 2020-01-25 RX ADMIN — LAMOTRIGINE 100 MILLIGRAM(S): 25 TABLET, ORALLY DISINTEGRATING ORAL at 17:01

## 2020-01-25 RX ADMIN — MEMANTINE HYDROCHLORIDE 10 MILLIGRAM(S): 10 TABLET ORAL at 05:14

## 2020-01-25 RX ADMIN — LAMOTRIGINE 100 MILLIGRAM(S): 25 TABLET, ORALLY DISINTEGRATING ORAL at 05:14

## 2020-01-25 RX ADMIN — MEMANTINE HYDROCHLORIDE 10 MILLIGRAM(S): 10 TABLET ORAL at 17:01

## 2020-01-25 RX ADMIN — PANTOPRAZOLE SODIUM 10 MG/HR: 20 TABLET, DELAYED RELEASE ORAL at 01:38

## 2020-01-25 RX ADMIN — QUETIAPINE FUMARATE 25 MILLIGRAM(S): 200 TABLET, FILM COATED ORAL at 17:01

## 2020-01-25 RX ADMIN — SIMVASTATIN 20 MILLIGRAM(S): 20 TABLET, FILM COATED ORAL at 21:14

## 2020-01-25 RX ADMIN — Medication 500 MILLIGRAM(S): at 17:02

## 2020-01-25 RX ADMIN — Medication 100 MILLIGRAM(S): at 19:35

## 2020-01-25 RX ADMIN — ESCITALOPRAM OXALATE 10 MILLIGRAM(S): 10 TABLET, FILM COATED ORAL at 11:07

## 2020-01-25 RX ADMIN — SODIUM CHLORIDE 50 MILLILITER(S): 9 INJECTION, SOLUTION INTRAVENOUS at 05:15

## 2020-01-25 NOTE — PROGRESS NOTE ADULT - ASSESSMENT
A 68 yo F with PMHx of breast cancer, dementia, depression, seizures, h/o CVA x2 and Cerebral Aneurysm, anemia hgb 5.9 on 1/19 s/p  transfusion  at that time brought in for abnormal labs by pts daughter as per EMS. Pt found in ed with severe anemia hgb 3.7. Pt hemodynamically stable.     Acute on chronic Anemia  -S/P 4 units of RBC transfusion.  Repeat CBC after transfusion is acceptable  -Monitor H/H daily  -Iron study shows iron def anemia.  B12, and folate level ok  -Patient refuses procedure but has no capacity for medical decision making   -discussed plan with pt's daughter, Ashley, at length - she spoke to hospice and is interested in pursuing egd/c-scope in pt  -previous EGD/Colonoscopy reviewed - polyps; terminal ilium wasn't probed; esophagitis, gastritis, dutton's esophagus  -IV PPI drip  -on full liquids and tolerating  -GI to follow up appreciated        Problem/Plan - 2:  ·  Problem: Seizures.  Plan: continue lamotrigine.      Problem/Plan - 3:  ·  Problem: Depression, unspecified depression type.  Plan: continue home meds quetiapine, escitalopram.      Problem/Plan - 4:  ·  Problem: Hyperlipidemia, unspecified hyperlipidemia type.  Plan: continue statin.     Problem/Plan - 5:  ·  Problem: Dementia.  Plan: continue with home medications  Psych consult appreciated - pt lacks capacity for medical decision making  continue 1:1 sit.     Problem/Plan - 6:  Problem: Hypokalemia. Plan: resolved    #Morbid obesity  -diet and lifestyle modification     DNR/DNI as per pt's daughter         Progress Note Handoff  Pending Consults: GI follow up  Pending Tests: none  Pending Results: cbc  Family Discussion: discussed EGD/C-scope as well as hospice with pt's daughter at length - in agreement    Disposition: Home_____/SNF______/Other_____/Unknown at this time_x____    Please call me with any questions at extension [phone]

## 2020-01-25 NOTE — CHART NOTE - NSCHARTNOTEFT_GEN_A_CORE
Discuss plan with daughter, Ashley - she would like to pursue hospice.  will d/c [facility]  anticipate d/c in am with keri suarez - need 24hrs notice upon d/c  case management aware

## 2020-01-25 NOTE — CHART NOTE - NSCHARTNOTEFT_GEN_A_CORE
pt spiked fever  previously infiltrated IV seen   right arm is warm to the touch with minimal swelling    doppler done - prelim negative  Pt given PO keflex earlier today    Temp now 101.2    Vital Signs Last 24 Hrs  T(C): 36.4 (25 Jan 2020 11:53), Max: 36.4 (25 Jan 2020 11:53)  T(F): 97.5 (25 Jan 2020 11:53), Max: 97.5 (25 Jan 2020 11:53)  HR: 88 (25 Jan 2020 11:53) (88 - 88)  BP: 96/50 (25 Jan 2020 12:16) (96/50 - 96/50)  BP(mean): --  RR: 18 (25 Jan 2020 11:53) (18 - 18)  SpO2: 95% (25 Jan 2020 11:53) (95% - 95%)      Pt swabbed for the flu     if flu swab is negative will start broad spectrum abx  check ua and culture  check cxr stat    will follow.

## 2020-01-25 NOTE — PROGRESS NOTE ADULT - SUBJECTIVE AND OBJECTIVE BOX
KDRHETTSONYA  69y  Female      Patient is a 69y old Female who presents with anemia (21 Jan 2020 10:52)      INTERVAL HPI/OVERNIGHT EVENTS:  Patient seen and examined earlier this morning.  Sitting in the chair on a 1:1 sit bedside.  Denies any complaints.  Pt is demented.   Discussed case with pt's daughter, Ashley - [phone] - she is interested in an inpatient EGD/Colonoscopy.  GI follow up pending - Dr. Meadows.    Pt is calm and cooperative      REVIEW OF SYSTEMS:  unable to assess due to dementia      Vital Signs Last 24 Hrs  T(C): 36.7 (25 Jan 2020 05:13), Max: 36.7 (25 Jan 2020 05:13)  T(F): 98.1 (25 Jan 2020 05:13), Max: 98.1 (25 Jan 2020 05:13)  HR: 64 (25 Jan 2020 05:13) (60 - 64)  BP: 146/90 (25 Jan 2020 05:13) (134/78 - 146/90)  BP(mean): --  RR: 18 (25 Jan 2020 05:13) (17 - 18)  SpO2: --      PHYSICAL EXAM:  GENERAL: NAD, well-groomed, well-developed  HEAD:  Atraumatic, Normocephalic  EYES: EOMI, PERRLA, conjunctiva and sclera clear  ENMT: No tonsillar erythema, exudates, or enlargement; Moist mucous membranes, Good dentition, No lesions  NECK: Supple, No JVD, Normal thyroid  NERVOUS SYSTEM:  awake/alert; confused   CHEST/LUNG: Clear to percussion bilaterally; No rales, rhonchi, wheezing, or rubs  HEART: Regular rate and rhythm; No murmurs, rubs, or gallops  ABDOMEN: Soft, Nontender, Nondistended; Bowel sounds present; obese  EXTREMITIES:  2+ Peripheral Pulses, No clubbing, cyanosis, or edema  LYMPH: No lymphadenopathy noted  SKIN: No rashes or lesions        Consultant(s) Notes Reviewed:  [x ] YES  [ ] NO  Care Discussed with Consultants/Other Providers [ x] YES  [ ] NO    LAB: CBC pending for 11am today                        8.3    5.89  )-----------( 157      ( 24 Jan 2020 07:04 )             27.6       01-24    142  |  105  |  8<L>  ----------------------------<  139<H>  4.0   |  24  |  0.8    Ca    9.0      24 Jan 2020 07:04  Mg     2.0     01-23    TPro  6.5  /  Alb  3.7  /  TBili  0.9  /  DBili  x   /  AST  24  /  ALT  13  /  AlkPhos  71  01-24        Drug Dosing Weight  Height (cm): 162.6 (20 Jan 2020 16:07)  Weight (kg): 110.6 (20 Jan 2020 16:07)  BMI (kg/m2): 41.8 (20 Jan 2020 16:07)  BSA (m2): 2.13 (20 Jan 2020 16:07)        RADIOLOGY & ADDITIONAL TESTS:  Imaging Personally Reviewed:  [x] YES  [ ] NO      HEALTH ISSUES - PROBLEM Dx:  Breast cancer in female: Breast cancer in female  Brain aneurysm: Brain aneurysm  Dementia: Dementia  Hyperlipidemia, unspecified hyperlipidemia type: Hyperlipidemia, unspecified hyperlipidemia type  Depression, unspecified depression type: Depression, unspecified depression type  Seizures: Seizures  Anemia: Anemia        MEDICATIONS  (STANDING):  chlorhexidine 4% Liquid 1 Application(s) Topical <User Schedule>  escitalopram 10 milliGRAM(s) Oral daily  lamoTRIgine 100 milliGRAM(s) Oral two times a day  memantine 10 milliGRAM(s) Oral two times a day  pantoprazole Infusion 8 mG/Hr (10 mL/Hr) IV Continuous <Continuous>  QUEtiapine 25 milliGRAM(s) Oral two times a day  simvastatin 20 milliGRAM(s) Oral at bedtime    MEDICATIONS  (PRN):  bisacodyl 5 milliGRAM(s) Oral daily PRN Constipation  ondansetron Injectable 4 milliGRAM(s) IV Push every 6 hours PRN Nausea  senna 2 Tablet(s) Oral at bedtime PRN Constipation

## 2020-01-26 LAB
BLD GP AB SCN SERPL QL: SIGNIFICANT CHANGE UP
HCT VFR BLD CALC: 25.7 % — LOW (ref 37–47)
HGB BLD-MCNC: 7.6 G/DL — LOW (ref 12–16)
MCHC RBC-ENTMCNC: 24.4 PG — LOW (ref 27–31)
MCHC RBC-ENTMCNC: 29.6 G/DL — LOW (ref 32–37)
MCV RBC AUTO: 82.4 FL — SIGNIFICANT CHANGE UP (ref 81–99)
NRBC # BLD: 0 /100 WBCS — SIGNIFICANT CHANGE UP (ref 0–0)
PLATELET # BLD AUTO: 131 K/UL — SIGNIFICANT CHANGE UP (ref 130–400)
RBC # BLD: 3.12 M/UL — LOW (ref 4.2–5.4)
RBC # FLD: 20.1 % — HIGH (ref 11.5–14.5)
WBC # BLD: 5.48 K/UL — SIGNIFICANT CHANGE UP (ref 4.8–10.8)
WBC # FLD AUTO: 5.48 K/UL — SIGNIFICANT CHANGE UP (ref 4.8–10.8)

## 2020-01-26 PROCEDURE — 99233 SBSQ HOSP IP/OBS HIGH 50: CPT

## 2020-01-26 RX ADMIN — ESCITALOPRAM OXALATE 10 MILLIGRAM(S): 10 TABLET, FILM COATED ORAL at 12:38

## 2020-01-26 RX ADMIN — Medication 100 MILLIGRAM(S): at 12:37

## 2020-01-26 RX ADMIN — LAMOTRIGINE 100 MILLIGRAM(S): 25 TABLET, ORALLY DISINTEGRATING ORAL at 17:27

## 2020-01-26 RX ADMIN — Medication 100 MILLIGRAM(S): at 17:27

## 2020-01-26 RX ADMIN — QUETIAPINE FUMARATE 25 MILLIGRAM(S): 200 TABLET, FILM COATED ORAL at 05:26

## 2020-01-26 RX ADMIN — MEMANTINE HYDROCHLORIDE 10 MILLIGRAM(S): 10 TABLET ORAL at 05:26

## 2020-01-26 RX ADMIN — QUETIAPINE FUMARATE 25 MILLIGRAM(S): 200 TABLET, FILM COATED ORAL at 17:28

## 2020-01-26 RX ADMIN — PANTOPRAZOLE SODIUM 10 MG/HR: 20 TABLET, DELAYED RELEASE ORAL at 21:41

## 2020-01-26 RX ADMIN — Medication 100 MILLIGRAM(S): at 00:16

## 2020-01-26 RX ADMIN — SIMVASTATIN 20 MILLIGRAM(S): 20 TABLET, FILM COATED ORAL at 21:41

## 2020-01-26 RX ADMIN — LAMOTRIGINE 100 MILLIGRAM(S): 25 TABLET, ORALLY DISINTEGRATING ORAL at 05:26

## 2020-01-26 RX ADMIN — PANTOPRAZOLE SODIUM 10 MG/HR: 20 TABLET, DELAYED RELEASE ORAL at 06:37

## 2020-01-26 RX ADMIN — MEMANTINE HYDROCHLORIDE 10 MILLIGRAM(S): 10 TABLET ORAL at 17:27

## 2020-01-26 RX ADMIN — Medication 100 MILLIGRAM(S): at 23:43

## 2020-01-26 RX ADMIN — PANTOPRAZOLE SODIUM 10 MG/HR: 20 TABLET, DELAYED RELEASE ORAL at 12:39

## 2020-01-26 RX ADMIN — Medication 100 MILLIGRAM(S): at 05:27

## 2020-01-26 NOTE — PROGRESS NOTE ADULT - SUBJECTIVE AND OBJECTIVE BOX
SONYA YI  69y  Female      Patient is a 69y old Female who presents with anemia (21 Jan 2020 10:52)      INTERVAL HPI/OVERNIGHT EVENTS:  Patient seen and examined earlier this morning.  Sitting in the chair on a 1:1 sit bedside.  Denies any complaints.  Pt is demented.  Patient is sleepier than normal but does wake up with verbal and tactile stimuli. Received seroquel at 5am today  Pt developed a fever yesterday.  Work up ongoing.  Afebrile this morning.   Discussed case with pt's daughter, Ashley - [phone] -       REVIEW OF SYSTEMS:  unable to assess due to dementia      Vital Signs Last 24 Hrs  T(C): 36.6 (26 Jan 2020 06:15), Max: 38.4 (25 Jan 2020 16:14)  T(F): 97.9 (26 Jan 2020 06:15), Max: 101.2 (25 Jan 2020 16:14)  HR: 65 (26 Jan 2020 06:15) (65 - 83)  BP: 135/58 (26 Jan 2020 06:15) (129/74 - 135/58)  BP(mean): --  RR: 18 (26 Jan 2020 06:15) (16 - 22)  SpO2: --      PHYSICAL EXAM:  GENERAL: NAD, well-groomed, well-developed  HEAD:  Atraumatic, Normocephalic  EYES: EOMI, PERRLA, conjunctiva and sclera clear  ENMT: No tonsillar erythema, exudates, or enlargement; Moist mucous membranes, Good dentition, No lesions  NECK: Supple, No JVD, Normal thyroid  NERVOUS SYSTEM:  awake/alert; confused   CHEST/LUNG: Clear to percussion bilaterally; No rales, rhonchi, wheezing, or rubs  HEART: Regular rate and rhythm; No murmurs, rubs, or gallops  ABDOMEN: Soft, Nontender, Nondistended; Bowel sounds present; obese  EXTREMITIES:  2+ Peripheral Pulses, No clubbing, cyanosis; mild edema in RUE  LYMPH: No lymphadenopathy noted  SKIN: No rashes or lesions        Consultant(s) Notes Reviewed:  [x ] YES  [ ] NO  Care Discussed with Consultants/Other Providers [ x] YES  [ ] NO    LAB:                           7.6    5.48  )-----------( 131      ( 26 Jan 2020 06:55 )             25.7           Drug Dosing Weight  Height (cm): 162.6 (20 Jan 2020 16:07)  Weight (kg): 110.6 (20 Jan 2020 16:07)  BMI (kg/m2): 41.8 (20 Jan 2020 16:07)  BSA (m2): 2.13 (20 Jan 2020 16:07)        RADIOLOGY & ADDITIONAL TESTS:  Imaging Personally Reviewed:  [x] YES  [ ] NO      HEALTH ISSUES - PROBLEM Dx:  Breast cancer in female: Breast cancer in female  Brain aneurysm: Brain aneurysm  Dementia: Dementia  Hyperlipidemia, unspecified hyperlipidemia type: Hyperlipidemia, unspecified hyperlipidemia type  Depression, unspecified depression type: Depression, unspecified depression type  Seizures: Seizures  Anemia: Anemia        MEDICATIONS  (STANDING):  ceFAZolin   IVPB 1000 milliGRAM(s) IV Intermittent every 6 hours  chlorhexidine 4% Liquid 1 Application(s) Topical <User Schedule>  escitalopram 10 milliGRAM(s) Oral daily  lamoTRIgine 100 milliGRAM(s) Oral two times a day  memantine 10 milliGRAM(s) Oral two times a day  pantoprazole Infusion 8 mG/Hr (10 mL/Hr) IV Continuous <Continuous>  QUEtiapine 25 milliGRAM(s) Oral two times a day  simvastatin 20 milliGRAM(s) Oral at bedtime    MEDICATIONS  (PRN):  bisacodyl 5 milliGRAM(s) Oral daily PRN Constipation  ondansetron Injectable 4 milliGRAM(s) IV Push every 6 hours PRN Nausea  senna 2 Tablet(s) Oral at bedtime PRN Constipation

## 2020-01-26 NOTE — PROGRESS NOTE ADULT - ASSESSMENT
A 70 yo F with PMHx of breast cancer, dementia, depression, seizures, h/o CVA x2 and Cerebral Aneurysm, anemia hgb 5.9 on 1/19 s/p  transfusion  at that time brought in for abnormal labs by pts daughter as per EMS. Pt found in ed with severe anemia hgb 3.7. Pt hemodynamically stable.     Acute on chronic Anemia  -S/P 4 units of RBC transfusion.    -type and screen ordered  -Monitor H/H daily  -Iron study shows iron def anemia.  B12, and folate level ok  -Patient refuses procedure but has no capacity for medical decision making   -discussed plan with pt's daughter, Ashley, at length - she spoke to hospice and is interested in hospice upon d/c  -previous EGD/Colonoscopy reviewed - polyps; terminal ilium wasn't probed; esophagitis, gastritis, dutton's esophagus  -IV PPI drip  -on full liquids and tolerating - advance in am       Problem/Plan - 2:  ·  Problem: Seizures.  Plan: continue lamotrigine.      Problem/Plan - 3:  ·  Problem: Depression, unspecified depression type.  Plan: continue home meds quetiapine, escitalopram.      Problem/Plan - 4:  ·  Problem: Hyperlipidemia, unspecified hyperlipidemia type.  Plan: continue statin.     Problem/Plan - 5:  ·  Problem: Dementia.  Plan: continue with home medications  Psych consult appreciated - pt lacks capacity for medical decision making  continue 1:1 sit.     Problem/Plan - 6:  Problem: Hypokalemia. Plan: resolved    #Morbid obesity  -diet and lifestyle modification     #fever  -follow up doppler - prelim negative  -cxr napd  -ua negative  -flu negative  -due to cellulitis?   -continue ancef for now  -ID eval     DNR/DNI as per pt's daughter         Progress Note Handoff  Pending Consults: none  Pending Tests: none  Pending Results: cbc  Family Discussion: discussed fever workup and d/c in am if afebrile with pt's daughter, Ashley - in agreement    Disposition: Home_____/SNF______/Other_____/Unknown at this time_x____    Please call me with any questions at extension [phone]

## 2020-01-27 LAB
ALBUMIN SERPL ELPH-MCNC: 3.5 G/DL — SIGNIFICANT CHANGE UP (ref 3.5–5.2)
ALP SERPL-CCNC: 64 U/L — SIGNIFICANT CHANGE UP (ref 30–115)
ALT FLD-CCNC: 7 U/L — SIGNIFICANT CHANGE UP (ref 0–41)
ANION GAP SERPL CALC-SCNC: 13 MMOL/L — SIGNIFICANT CHANGE UP (ref 7–14)
AST SERPL-CCNC: 22 U/L — SIGNIFICANT CHANGE UP (ref 0–41)
BILIRUB SERPL-MCNC: 0.5 MG/DL — SIGNIFICANT CHANGE UP (ref 0.2–1.2)
BUN SERPL-MCNC: 10 MG/DL — SIGNIFICANT CHANGE UP (ref 10–20)
CALCIUM SERPL-MCNC: 9 MG/DL — SIGNIFICANT CHANGE UP (ref 8.5–10.1)
CHLORIDE SERPL-SCNC: 104 MMOL/L — SIGNIFICANT CHANGE UP (ref 98–110)
CO2 SERPL-SCNC: 22 MMOL/L — SIGNIFICANT CHANGE UP (ref 17–32)
CREAT SERPL-MCNC: 0.8 MG/DL — SIGNIFICANT CHANGE UP (ref 0.7–1.5)
CULTURE RESULTS: SIGNIFICANT CHANGE UP
GLUCOSE SERPL-MCNC: 136 MG/DL — HIGH (ref 70–99)
HCT VFR BLD CALC: 26.6 % — LOW (ref 37–47)
HGB BLD-MCNC: 7.9 G/DL — LOW (ref 12–16)
MCHC RBC-ENTMCNC: 24.2 PG — LOW (ref 27–31)
MCHC RBC-ENTMCNC: 29.7 G/DL — LOW (ref 32–37)
MCV RBC AUTO: 81.3 FL — SIGNIFICANT CHANGE UP (ref 81–99)
NRBC # BLD: 0 /100 WBCS — SIGNIFICANT CHANGE UP (ref 0–0)
PLATELET # BLD AUTO: 136 K/UL — SIGNIFICANT CHANGE UP (ref 130–400)
POTASSIUM SERPL-MCNC: 4.6 MMOL/L — SIGNIFICANT CHANGE UP (ref 3.5–5)
POTASSIUM SERPL-SCNC: 4.6 MMOL/L — SIGNIFICANT CHANGE UP (ref 3.5–5)
PROT SERPL-MCNC: 6.4 G/DL — SIGNIFICANT CHANGE UP (ref 6–8)
RBC # BLD: 3.27 M/UL — LOW (ref 4.2–5.4)
RBC # FLD: 19.8 % — HIGH (ref 11.5–14.5)
SODIUM SERPL-SCNC: 139 MMOL/L — SIGNIFICANT CHANGE UP (ref 135–146)
SPECIMEN SOURCE: SIGNIFICANT CHANGE UP
WBC # BLD: 6.36 K/UL — SIGNIFICANT CHANGE UP (ref 4.8–10.8)
WBC # FLD AUTO: 6.36 K/UL — SIGNIFICANT CHANGE UP (ref 4.8–10.8)

## 2020-01-27 PROCEDURE — 99232 SBSQ HOSP IP/OBS MODERATE 35: CPT

## 2020-01-27 PROCEDURE — 99233 SBSQ HOSP IP/OBS HIGH 50: CPT

## 2020-01-27 RX ADMIN — MEMANTINE HYDROCHLORIDE 10 MILLIGRAM(S): 10 TABLET ORAL at 17:41

## 2020-01-27 RX ADMIN — QUETIAPINE FUMARATE 25 MILLIGRAM(S): 200 TABLET, FILM COATED ORAL at 17:41

## 2020-01-27 RX ADMIN — Medication 100 MILLIGRAM(S): at 23:40

## 2020-01-27 RX ADMIN — CHLORHEXIDINE GLUCONATE 1 APPLICATION(S): 213 SOLUTION TOPICAL at 11:06

## 2020-01-27 RX ADMIN — LAMOTRIGINE 100 MILLIGRAM(S): 25 TABLET, ORALLY DISINTEGRATING ORAL at 17:41

## 2020-01-27 RX ADMIN — Medication 100 MILLIGRAM(S): at 11:08

## 2020-01-27 RX ADMIN — LAMOTRIGINE 100 MILLIGRAM(S): 25 TABLET, ORALLY DISINTEGRATING ORAL at 06:50

## 2020-01-27 RX ADMIN — Medication 100 MILLIGRAM(S): at 20:47

## 2020-01-27 RX ADMIN — Medication 100 MILLIGRAM(S): at 06:41

## 2020-01-27 RX ADMIN — SIMVASTATIN 20 MILLIGRAM(S): 20 TABLET, FILM COATED ORAL at 21:46

## 2020-01-27 RX ADMIN — PANTOPRAZOLE SODIUM 10 MG/HR: 20 TABLET, DELAYED RELEASE ORAL at 06:48

## 2020-01-27 RX ADMIN — MEMANTINE HYDROCHLORIDE 10 MILLIGRAM(S): 10 TABLET ORAL at 06:50

## 2020-01-27 RX ADMIN — QUETIAPINE FUMARATE 25 MILLIGRAM(S): 200 TABLET, FILM COATED ORAL at 06:48

## 2020-01-27 RX ADMIN — ESCITALOPRAM OXALATE 10 MILLIGRAM(S): 10 TABLET, FILM COATED ORAL at 11:06

## 2020-01-27 NOTE — PROGRESS NOTE ADULT - ASSESSMENT
A 68 yo F with PMHx of breast cancer, dementia, depression, seizures, h/o CVA x2 and Cerebral Aneurysm, anemia hgb 5.9 on 1/19 s/p  transfusion  at that time brought in for abnormal labs by pts daughter as per EMS. Pt found in ed with severe anemia hgb 3.7. Pt hemodynamically stable.     Acute on chronic Anemia  -S/P 4 units of RBC transfusion   -type and screen active as of 1/26  -Monitor H/H daily  -Iron study shows iron def anemia.  B12, and folate level ok  -Patient refuses procedure but has no capacity for medical decision making   -discussed plan with pt's daughter, Ashley, at length - she spoke to hospice and is interested in hospice upon d/c  -previous EGD/Colonoscopy reviewed - polyps; terminal ilium wasn't probed; esophagitis, gastritis, dutton's esophagus  -d/c IV PPI drip today  -advanced diet to soft - pt is tolerating       Problem/Plan - 2:  ·  Problem: Seizures.  Plan: continue lamotrigine.      Problem/Plan - 3:  ·  Problem: Depression, unspecified depression type.  Plan: continue home meds quetiapine, escitalopram.      Problem/Plan - 4:  ·  Problem: Hyperlipidemia, unspecified hyperlipidemia type.  Plan: continue statin.     Problem/Plan - 5:  ·  Problem: Dementia.  Plan: continue with home medications  Psych consult appreciated - pt lacks capacity for medical decision making  continue 1:1 sit.     Problem/Plan - 6:  Problem: Hypokalemia. Plan: resolved    #Morbid obesity  -diet and lifestyle modification     #fever  -vascular RUE doppler - negative  -cxr napd  -ua negative  -flu negative  -due to cellulitis/infiltrated IV  -continue ancef for now  -ID eval pending    DNR/DNI as per pt's daughter         Progress Note Handoff  Pending Consults: none  Pending Tests: none  Pending Results: cbc, cmp  Family Discussion: discussed fever workup and d/c in am if afebrile with pt's daughter, Ashley  - in agreement.    Disposition: Home_____/SNF______/Other_____/Unknown at this time_x____    Please call me with any questions at extension [phone] A 68 yo F with PMHx of breast cancer, dementia, depression, seizures, h/o CVA x2 and Cerebral Aneurysm, anemia hgb 5.9 on 1/19 s/p  transfusion  at that time brought in for abnormal labs by pts daughter as per EMS. Pt found in ed with severe anemia hgb 3.7. Pt hemodynamically stable.     Acute on chronic Anemia/GIB possibly due to GI malignancy   -S/P 4 units of RBC transfusion   -type and screen active as of 1/26  -Monitor H/H daily  -Iron study shows iron def anemia.  B12, and folate level ok  -Patient refuses procedure but has no capacity for medical decision making   -discussed plan with pt's daughter, Ashley, at length - she spoke to hospice and is interested in hospice upon d/c  -previous EGD/Colonoscopy reviewed - polyps; terminal ilium wasn't probed; esophagitis, gastritis, dutton's esophagus  -d/c IV PPI drip today  -advanced diet to soft - pt is tolerating       Problem/Plan - 2:  ·  Problem: Seizures.  Plan: continue lamotrigine.      Problem/Plan - 3:  ·  Problem: Depression, unspecified depression type.  Plan: continue home meds quetiapine, escitalopram.      Problem/Plan - 4:  ·  Problem: Hyperlipidemia, unspecified hyperlipidemia type.  Plan: continue statin.     Problem/Plan - 5:  ·  Problem: Dementia.  Plan: continue with home medications  Psych consult appreciated - pt lacks capacity for medical decision making  continue 1:1 sit.     Problem/Plan - 6:  Problem: Hypokalemia. Plan: resolved    #Morbid obesity  -diet and lifestyle modification     #fever  -vascular RUE doppler - negative  -cxr napd  -ua negative  -flu negative  -due to cellulitis/infiltrated IV  -continue ancef for now  -ID eval pending    DNR/DNI as per pt's daughter         Progress Note Handoff  Pending Consults: none  Pending Tests: none  Pending Results: cbc, cmp  Family Discussion: discussed fever workup and d/c in am if afebrile with pt's daughter, Ashley  - in agreement.    Disposition: Home_____/SNF______/Other_____/Unknown at this time_x____    Please call me with any questions at extension [phone]

## 2020-01-27 NOTE — PROGRESS NOTE ADULT - ASSESSMENT
69yFemale pmh CVA x2 and cerebral aneurysm, anemia hgb 5.9 on 1/2019. Patient has dementia alert and oriented times 2 to person and place. Patient EGD/Colonoscopy 1/2019 significant for duodenitis, non erosive gastritis, and diverticulosis and 3 polyps were removed. Biopsies showed sessile serrated non dysplastic polyp and one polyp was tubular adenoma.      Prior hospitalist note says EGD/Colonoscopy but patient still refusing to drink prep, will assess  Problem 1-Anemia microcytic patient refusing GI work-up and refusing even a rectal exam  Rec  -Maintain Hemodynamic Stability   -Monitor CBC  -CMP,Optimize Electrolytes  -PT,PTT,INR  -Monitor H and H  -Transfuse prn to hgb >8  -Continue PPI BID  -Monitor Vital Signs  -Monitor Stool For blood, frequency, consistency, melena  -Active Type and Screen  -The benefits of endoscopy and colonoscopy as well as the risks, such as GI bleeding, aspiration pneumonia, perforation, damage or loss of teeth, reaction to medication, or death were reviewed with the patient again. Patient states she does not care and she will not consent to endoscopy or colonoscopy  -Currently at a stalemate still as for colonoscopy part patient will need to prep and she has stated multiple times she will not prep for colonoscopy. Patient during her last admission had her colonoscopy cancelled multiple times as she refused to drink her colonoscopy prep  -Follow up with [facility] located at [address] [place] [address]. Phone Number: [phone] for outpatient EGD/Colonoscopy 69yFemale pmh CVA x2 and cerebral aneurysm, anemia hgb 5.9 on 1/2019. Patient has dementia alert and oriented times 2 to person and place. Patient EGD/Colonoscopy 1/2019 significant for duodenitis, non erosive gastritis, and diverticulosis and 3 polyps were removed. Biopsies showed sessile serrated non dysplastic polyp and one polyp was tubular adenoma.    Prior hospitalist note says EGD/Colonoscopy but patient still refusing to drink prep no plans for EGD/Colonoscopy as of now  Problem 1-Anemia microcytic patient refusing GI work-up and refusing even a rectal exam  Rec  -Maintain Hemodynamic Stability   -Monitor CBC  -CMP,Optimize Electrolytes  -PT,PTT,INR  -Monitor H and H  -Transfuse prn to hgb >8  -Continue PPI BID  -Monitor Vital Signs  -Monitor Stool For blood, frequency, consistency, melena  -Active Type and Screen  -The benefits of endoscopy and colonoscopy as well as the risks, such as GI bleeding, aspiration pneumonia, perforation, damage or loss of teeth, reaction to medication, or death were reviewed with the patient again. Patient states she does not care and she will not consent to endoscopy or colonoscopy  -Follow up with our [facility] located at [address] [place] [address]. Phone Number: [phone] for outpatient EGD/Colonoscopy

## 2020-01-27 NOTE — PROGRESS NOTE ADULT - SUBJECTIVE AND OBJECTIVE BOX
KD SONYA  69y  Female      Patient is a 69y old Female who presents with anemia (21 Jan 2020 10:52)      INTERVAL HPI/OVERNIGHT EVENTS:  Patient seen and examined earlier this morning.  Sitting in the chair on a 1:1 sit bedside.  Denies any complaints.  Pt is demented, calm and cooperative  Pt developed a fever on 1/25.  Work up negative.  Afebrile this morning.   Discussed case with pt's daughter, Ashley - [phone] - anticipate d/c in am  Pt will get blood transfusion if hgb is 7.6 or below today      REVIEW OF SYSTEMS:  unable to assess due to dementia      Vital Signs Last 24 Hrs  T(C): 36.8 (27 Jan 2020 06:30), Max: 37.2 (26 Jan 2020 14:30)  T(F): 98.3 (27 Jan 2020 06:30), Max: 99 (26 Jan 2020 14:30)  HR: 68 (27 Jan 2020 06:30) (68 - 70)  BP: 152/83 (27 Jan 2020 06:30) (127/63 - 152/83)  BP(mean): --  RR: 18 (27 Jan 2020 06:30) (18 - 18)  SpO2: --        PHYSICAL EXAM:  GENERAL: NAD, well-groomed, well-developed  HEAD:  Atraumatic, Normocephalic  EYES: EOMI, PERRLA, conjunctiva and sclera clear  ENMT: No tonsillar erythema, exudates, or enlargement; Moist mucous membranes, Good dentition, No lesions  NECK: Supple, No JVD, Normal thyroid  NERVOUS SYSTEM:  awake/alert; confused   CHEST/LUNG: Clear to percussion bilaterally; No rales, rhonchi, wheezing, or rubs  HEART: Regular rate and rhythm; No murmurs, rubs, or gallops  ABDOMEN: Soft, Nontender, Nondistended; Bowel sounds present; obese  EXTREMITIES:  2+ Peripheral Pulses, No clubbing, cyanosis; mild edema in RUE  LYMPH: No lymphadenopathy noted  SKIN: No rashes or lesions        Consultant(s) Notes Reviewed:  [x ] YES  [ ] NO  Care Discussed with Consultants/Other Providers [ x] YES  [ ] NO    LAB:  Pending for today                          7.6    5.48  )-----------( 131      ( 26 Jan 2020 06:55 )             25.7           Drug Dosing Weight  Height (cm): 162.6 (20 Jan 2020 16:07)  Weight (kg): 110.6 (20 Jan 2020 16:07)  BMI (kg/m2): 41.8 (20 Jan 2020 16:07)  BSA (m2): 2.13 (20 Jan 2020 16:07)        RADIOLOGY & ADDITIONAL TESTS:  Imaging Personally Reviewed:  [x] YES  [ ] NO      HEALTH ISSUES - PROBLEM Dx:  Breast cancer in female: Breast cancer in female  Brain aneurysm: Brain aneurysm  Dementia: Dementia  Hyperlipidemia, unspecified hyperlipidemia type: Hyperlipidemia, unspecified hyperlipidemia type  Depression, unspecified depression type: Depression, unspecified depression type  Seizures: Seizures  Anemia: Anemia        MEDICATIONS  (STANDING):  ceFAZolin   IVPB 1000 milliGRAM(s) IV Intermittent every 6 hours  chlorhexidine 4% Liquid 1 Application(s) Topical <User Schedule>  escitalopram 10 milliGRAM(s) Oral daily  lamoTRIgine 100 milliGRAM(s) Oral two times a day  memantine 10 milliGRAM(s) Oral two times a day  pantoprazole Infusion 8 mG/Hr (10 mL/Hr) IV Continuous <Continuous>  QUEtiapine 25 milliGRAM(s) Oral two times a day  simvastatin 20 milliGRAM(s) Oral at bedtime    MEDICATIONS  (PRN):  bisacodyl 5 milliGRAM(s) Oral daily PRN Constipation  ondansetron Injectable 4 milliGRAM(s) IV Push every 6 hours PRN Nausea  senna 2 Tablet(s) Oral at bedtime PRN Constipation

## 2020-01-27 NOTE — PROGRESS NOTE ADULT - NSHPATTENDINGPLANDISCUSS_GEN_ALL_CORE
medical staff
medical staff and pt's daughter
medical staff and pt's daughter
medical staff and pt's daughter in detail
medical staff and pt's daughter, Ashley
care team
medical staff

## 2020-01-27 NOTE — PROGRESS NOTE ADULT - SUBJECTIVE AND OBJECTIVE BOX
69yFemale  Being followed for   Interval history:      PAST MEDICAL & SURGICAL HISTORY:   Brain aneurysm  Seizures  Depression, unspecified depression type  Hyperlipidemia, unspecified hyperlipidemia type  Dementia  Breast cancer in female  H/O abdominal hysterectomy  H/O mastectomy, left          Social History: No smoking. No alcohol. No illegal drug use.          MEDICATIONS:  MEDICATIONS  (STANDING):  ceFAZolin   IVPB 1000 milliGRAM(s) IV Intermittent every 6 hours  chlorhexidine 4% Liquid 1 Application(s) Topical <User Schedule>  escitalopram 10 milliGRAM(s) Oral daily  lamoTRIgine 100 milliGRAM(s) Oral two times a day  memantine 10 milliGRAM(s) Oral two times a day  pantoprazole Infusion 8 mG/Hr (10 mL/Hr) IV Continuous <Continuous>  QUEtiapine 25 milliGRAM(s) Oral two times a day  simvastatin 20 milliGRAM(s) Oral at bedtime    MEDICATIONS  (PRN):  bisacodyl 5 milliGRAM(s) Oral daily PRN Constipation  ondansetron Injectable 4 milliGRAM(s) IV Push every 6 hours PRN Nausea  senna 2 Tablet(s) Oral at bedtime PRN Constipation      Allergies:     No Known Allergies              REVIEW OF SYSTEMS:  General:  No weight loss, fevers, or chills.  Eyes:  No reported pain or visual changes  ENT:  No sore throat or runny nose.  NECK: No stiffness   CV:  No chest pain or palpitations.  Resp:  No shortness of breath, cough  GI:  No abdominal pain, nausea, vomiting, dysphagia, diarrhea or constipation. No rectal bleeding, melena, or hematemesis.  Neuro:  No tingling, numbness           VITAL SIGNS:   T(F): 98.3 (01-27-20 @ 06:30), Max: 99 (01-26-20 @ 14:30)  HR: 68 (01-27-20 @ 06:30) (68 - 70)  BP: 152/83 (01-27-20 @ 06:30) (127/63 - 152/83)  RR: 18 (01-27-20 @ 06:30) (18 - 18)  SpO2: --    PHYSICAL EXAM:  GENERAL: AAOx2 to person and place not time, no acute distress.  HEAD:  Atraumatic, Normocephalic  EYES: conjunctiva and sclera clear  NECK: Supple, no JVD or thyromegaly  CHEST/LUNG: Clear to auscultation bilaterally; No wheeze, rhonchi, or rales  HEART: Regular rate and rhythm; normal S1, S2, No murmurs.  ABDOMEN: Soft, nontender, nondistended; Bowel sounds present, no abdominal bruit, masses, or hepatosplenomegaly  NEUROLOGY: No asterixis or tremor.   SKIN: Intact, no jaundice            LABS:                        7.6    5.48  )-----------( 131      ( 26 Jan 2020 06:55 )             25.7                 IMAGING:    < from: Xray Chest 1 View-PORTABLE IMMEDIATE (01.25.20 @ 17:57) >  EXAM:  XR CHEST PORTABLE IMMED 1V            PROCEDURE DATE:  01/25/2020            INTERPRETATION:  Clinical History / Reason for exam: Fever    Comparison : Chest radiograph March 14, 2019.    Technique/Positioning: Adequate.    Findings:    Support devices: None.    Cardiac/mediastinum/hilum: Cardiomegaly, worse.    Lung parenchyma/Pleura: Within normal limits.    Skeleton/soft tissues: Stable    Impression:     Cardiomegaly, worse.    No radiographic evidence of acute cardiopulmonary disease.                  KIRILL ROBERT M.D., ATTENDING RADIOLOGIST  This document has been electronically signed. Jan 26 2020  8:03AM              < end of copied text > 69yFemale  Being followed for anemia no overt signs of GI bleeding   Interval history: Patient denies nausea, vomiting, hematemesis, melena, blood in stool, diarrhea, constipation, abdominal pain.       PAST MEDICAL & SURGICAL HISTORY:   Brain aneurysm  Seizures  Depression, unspecified depression type  Hyperlipidemia, unspecified hyperlipidemia type  Dementia  Breast cancer in female  H/O abdominal hysterectomy  H/O mastectomy, left          Social History: No smoking. No alcohol. No illegal drug use.          MEDICATIONS:  MEDICATIONS  (STANDING):  ceFAZolin   IVPB 1000 milliGRAM(s) IV Intermittent every 6 hours  chlorhexidine 4% Liquid 1 Application(s) Topical <User Schedule>  escitalopram 10 milliGRAM(s) Oral daily  lamoTRIgine 100 milliGRAM(s) Oral two times a day  memantine 10 milliGRAM(s) Oral two times a day  pantoprazole Infusion 8 mG/Hr (10 mL/Hr) IV Continuous <Continuous>  QUEtiapine 25 milliGRAM(s) Oral two times a day  simvastatin 20 milliGRAM(s) Oral at bedtime    MEDICATIONS  (PRN):  bisacodyl 5 milliGRAM(s) Oral daily PRN Constipation  ondansetron Injectable 4 milliGRAM(s) IV Push every 6 hours PRN Nausea  senna 2 Tablet(s) Oral at bedtime PRN Constipation      Allergies:     No Known Allergies          REVIEW OF SYSTEMS:  General:  No weight loss, fevers, or chills.  Eyes:  No reported pain or visual changes  ENT:  No sore throat or runny nose.  NECK: No stiffness   CV:  No chest pain or palpitations.  Resp:  No shortness of breath, cough  GI:  No abdominal pain, nausea, vomiting, dysphagia, diarrhea or constipation. No rectal bleeding, melena, or hematemesis.  Neuro:  No tingling, numbness           VITAL SIGNS:   T(F): 98.3 (01-27-20 @ 06:30), Max: 99 (01-26-20 @ 14:30)  HR: 68 (01-27-20 @ 06:30) (68 - 70)  BP: 152/83 (01-27-20 @ 06:30) (127/63 - 152/83)  RR: 18 (01-27-20 @ 06:30) (18 - 18)  SpO2: --    PHYSICAL EXAM:  GENERAL: AAOx2 to person and place not time, no acute distress.  HEAD:  Atraumatic, Normocephalic  EYES: conjunctiva and sclera clear  NECK: Supple, no JVD or thyromegaly  CHEST/LUNG: Clear to auscultation bilaterally; No wheeze, rhonchi, or rales  HEART: Regular rate and rhythm; normal S1, S2, No murmurs.  ABDOMEN: Soft, nontender, nondistended; Bowel sounds present, no abdominal bruit, masses, or hepatosplenomegaly  NEUROLOGY: No asterixis or tremor.   SKIN: Intact, no jaundice  Rectal exam-patient refused           LABS:                        7.6    5.48  )-----------( 131      ( 26 Jan 2020 06:55 )             25.7                 IMAGING:    < from: Xray Chest 1 View-PORTABLE IMMEDIATE (01.25.20 @ 17:57) >  EXAM:  XR CHEST PORTABLE IMMED 1V            PROCEDURE DATE:  01/25/2020            INTERPRETATION:  Clinical History / Reason for exam: Fever    Comparison : Chest radiograph March 14, 2019.    Technique/Positioning: Adequate.    Findings:    Support devices: None.    Cardiac/mediastinum/hilum: Cardiomegaly, worse.    Lung parenchyma/Pleura: Within normal limits.    Skeleton/soft tissues: Stable    Impression:     Cardiomegaly, worse.    No radiographic evidence of acute cardiopulmonary disease.                  KIRILL ROBERT M.D., ATTENDING RADIOLOGIST  This document has been electronically signed. Jan 26 2020  8:03AM              < end of copied text >

## 2020-01-27 NOTE — CONSULT NOTE ADULT - SUBJECTIVE AND OBJECTIVE BOX
KD SONYA  69y, Female  Allergy: No Known Allergies      CHIEF COMPLAINT: anemia (2020 11:32)      HPI:  A 68 yo F with PMHx of breast cancer, dementia, depression, seizures, h/o CVA x2 and Cerebral Aneurysm, anemia hgb 5.9 on  s/p  transfusion  at that time brought in for abnormal labs as per electronic medical records. No family members at bedside, Pt with dementia unable to provide history.  Pt does not answer  and keeps asking unrelated questions. Pt had EGD/colonoscopy on 2019 significant for duodenitis, non erosive gastritis and diverticulosis. (2020 14:44)    FAMILY HISTORY:  No pertinent family history in first degree relatives    PAST MEDICAL & SURGICAL HISTORY:  Brain aneurysm  Seizures  Depression, unspecified depression type  Hyperlipidemia, unspecified hyperlipidemia type  Dementia  Breast cancer in female  H/O abdominal hysterectomy  H/O mastectomy, left    Unable to obtain  Substance Use (  ) never used  (  ) IVDU (  ) Other:  Tobacco Usage:  (   ) never smoked   (   ) former smoker   (   ) current smoker   Alcohol Usage: (   ) social  (   ) daily use (   ) denies  Sexual History: na      ROS  General: Denies fevers, chills, nightsweats, weight loss  HEENT: Denies headache, rhinorrhea, sore throat, eye pain  CV: Denies CP, palpitations  PULM: Denies SOB, cough  GI: Denies abdominal pain, diarrhea  : Denies dysuria, hematuria  MSK: Denies arthralgias  SKIN: Denies rash   NEURO: Denies paresthesias, weakness  PSYCH: Denies depression    VITALS:  T(F): 98.3, Max: 99 (20 @ 14:30)  HR: 68  BP: 152/83  RR: 18Vital Signs Last 24 Hrs  T(C): 36.8 (2020 06:30), Max: 37.2 (2020 14:30)  T(F): 98.3 (2020 06:30), Max: 99 (2020 14:30)  HR: 68 (2020 06:30) (68 - 70)  BP: 152/83 (2020 06:30) (127/63 - 152/83)  BP(mean): --  RR: 18 (2020 06:30) (18 - 18)  SpO2: --    PHYSICAL EXAM:  Gen: NAD, resting in bed  HEENT: Normocephalic, atraumatic  Neck: supple, no lymphadenopathy  CV: Regular rate & regular rhythm  Lungs: decreased BS at bases, no fremitus  Abdomen: Soft, BS present  Ext: Warm, well perfused  Neuro: non focal, awake  Skin: RUE erythema    TESTS & MEASUREMENTS:                        7.6    5.48  )-----------( 131      ( 2020 06:55 )             25.7               Urinalysis Basic - ( 2020 20:14 )    Color: Yellow / Appearance: Clear / S.020 / pH: x  Gluc: x / Ketone: Trace  / Bili: Small / Urobili: 4.0 mg/dL   Blood: x / Protein: Trace mg/dL / Nitrite: Negative   Leuk Esterase: Negative / RBC: x / WBC x   Sq Epi: x / Non Sq Epi: Moderate /HPF / Bacteria: Few        Culture - Urine (collected 20 @ 20:14)  Source: .Urine Clean Catch (Midstream)  Final Report (20 @ 07:03):    <10,000 CFU/mL Normal Urogenital Caitlin            INFECTIOUS DISEASES TESTING      RADIOLOGY & ADDITIONAL TESTS:  I have personally reviewed the last Chest xray  CXR      CT      CARDIOLOGY TESTING      MEDICATIONS  ceFAZolin   IVPB 1000  chlorhexidine 4% Liquid 1  escitalopram 10  lamoTRIgine 100  memantine 10  QUEtiapine 25  simvastatin 20      ANTIBIOTICS:  ceFAZolin   IVPB 1000 milliGRAM(s) IV Intermittent every 6 hours      All available historical data has been reviewed

## 2020-01-27 NOTE — PROGRESS NOTE ADULT - ATTENDING COMMENTS
69 yo f with anemia.    Patient adamantly refuses to cooperate with egd or colonoscopy. Please have patient followup in GI clinic after discharge.

## 2020-01-27 NOTE — CONSULT NOTE ADULT - ASSESSMENT
ASSESSMENT  69y F admitted with ANEMIA    HPI:  A 68 yo F with PMHx of breast cancer, dementia, depression, seizures, h/o CVA x2 and Cerebral Aneurysm, anemia hgb 5.9 on 1/19 s/p  transfusion  at that time brought in for abnormal labs as per electronic medical records. No family members at bedside, Pt with dementia unable to provide history.  Pt does not answer  and keeps asking unrelated questions. Pt had EGD/colonoscopy on 1/2019 significant for duodenitis, non erosive gastritis and diverticulosis.    PROBLEMS  1. Pt with recent fever due to RUE cellulitis  Tmax 99  wbc 5.48    1/25 U/A negative  1/25 Cxray negative    On ceFAZolin   IVPB 1000 milliGRAM(s) IV Intermittent every 6 hours    2. Anemia    3. RUE duplex negative      PLAN  - Continue Ancef with ultimate switch to PO Keflex 500mg q6h
neuro cognitive disorder    maintain lexapro and seroquel.  pt is not capable of making informed decision regarding her treatment and disposition.
69yFemale pmh CVA x2 and cerebral aneurysm, anemia hgb 5.9 on 1/2019. Patient has dementia alert and oriented times 2 to person and place. Patient EGD/Colonoscopy 1/2019 significant for duodenitis, non erosive gastritis, and diverticulosis and 3 polyps were removed. Biopsies showed sessile serrated non dysplastic polyp and one polyp was tubular adenoma.    Problem 1-Anemia microcytic patient refusing GI work-up and refusing even a rectal exam  Rec  -Maintain Hemodynamic Stability   -Monitor CBC  -CMP,Optimize Electrolytes  -PT,PTT,INR  -Monitor H and H  -Transfuse prn to hgb >8  -Continue PPI BID  -Monitor Vital Signs  -Monitor Stool For blood, frequency, consistency, melena  -Active Type and Screen  -Iron Studies, Folate, Vitamin B12 levels   -The benefits of endoscopy and colonoscopy as well as the risks, such as GI bleeding, aspiration pneumonia, perforation, damage or loss of teeth, reaction to medication, or death  were reviewed with the patient. Patient states she does not care and she will not consent to endoscopy or colonoscopy and told me to leave the room

## 2020-01-28 ENCOUNTER — TRANSCRIPTION ENCOUNTER (OUTPATIENT)
Age: 70
End: 2020-01-28

## 2020-01-28 VITALS
TEMPERATURE: 98 F | SYSTOLIC BLOOD PRESSURE: 140 MMHG | HEART RATE: 63 BPM | RESPIRATION RATE: 18 BRPM | DIASTOLIC BLOOD PRESSURE: 64 MMHG

## 2020-01-28 DIAGNOSIS — L03.119 CELLULITIS OF UNSPECIFIED PART OF LIMB: ICD-10-CM

## 2020-01-28 LAB
ALBUMIN SERPL ELPH-MCNC: 3.2 G/DL — LOW (ref 3.5–5.2)
ALP SERPL-CCNC: 60 U/L — SIGNIFICANT CHANGE UP (ref 30–115)
ALT FLD-CCNC: <5 U/L — SIGNIFICANT CHANGE UP (ref 0–41)
ANION GAP SERPL CALC-SCNC: 13 MMOL/L — SIGNIFICANT CHANGE UP (ref 7–14)
AST SERPL-CCNC: 37 U/L — SIGNIFICANT CHANGE UP (ref 0–41)
BILIRUB SERPL-MCNC: 0.6 MG/DL — SIGNIFICANT CHANGE UP (ref 0.2–1.2)
BUN SERPL-MCNC: 11 MG/DL — SIGNIFICANT CHANGE UP (ref 10–20)
CALCIUM SERPL-MCNC: 9 MG/DL — SIGNIFICANT CHANGE UP (ref 8.5–10.1)
CHLORIDE SERPL-SCNC: 106 MMOL/L — SIGNIFICANT CHANGE UP (ref 98–110)
CO2 SERPL-SCNC: 22 MMOL/L — SIGNIFICANT CHANGE UP (ref 17–32)
CREAT SERPL-MCNC: 0.8 MG/DL — SIGNIFICANT CHANGE UP (ref 0.7–1.5)
GLUCOSE SERPL-MCNC: 128 MG/DL — HIGH (ref 70–99)
HCT VFR BLD CALC: 28.7 % — LOW (ref 37–47)
HGB BLD-MCNC: 8.6 G/DL — LOW (ref 12–16)
MCHC RBC-ENTMCNC: 24.5 PG — LOW (ref 27–31)
MCHC RBC-ENTMCNC: 30 G/DL — LOW (ref 32–37)
MCV RBC AUTO: 81.8 FL — SIGNIFICANT CHANGE UP (ref 81–99)
NRBC # BLD: 0 /100 WBCS — SIGNIFICANT CHANGE UP (ref 0–0)
PLATELET # BLD AUTO: 130 K/UL — SIGNIFICANT CHANGE UP (ref 130–400)
POTASSIUM SERPL-MCNC: 5.2 MMOL/L — HIGH (ref 3.5–5)
POTASSIUM SERPL-SCNC: 5.2 MMOL/L — HIGH (ref 3.5–5)
PROT SERPL-MCNC: 6.2 G/DL — SIGNIFICANT CHANGE UP (ref 6–8)
RBC # BLD: 3.51 M/UL — LOW (ref 4.2–5.4)
RBC # FLD: 19.1 % — HIGH (ref 11.5–14.5)
SODIUM SERPL-SCNC: 141 MMOL/L — SIGNIFICANT CHANGE UP (ref 135–146)
WBC # BLD: 6.3 K/UL — SIGNIFICANT CHANGE UP (ref 4.8–10.8)
WBC # FLD AUTO: 6.3 K/UL — SIGNIFICANT CHANGE UP (ref 4.8–10.8)

## 2020-01-28 PROCEDURE — 99239 HOSP IP/OBS DSCHRG MGMT >30: CPT

## 2020-01-28 RX ORDER — CEPHALEXIN 500 MG
1 CAPSULE ORAL
Qty: 20 | Refills: 0
Start: 2020-01-28 | End: 2020-02-01

## 2020-01-28 RX ORDER — CEPHALEXIN 500 MG
500 CAPSULE ORAL EVERY 6 HOURS
Refills: 0 | Status: DISCONTINUED | OUTPATIENT
Start: 2020-01-28 | End: 2020-01-28

## 2020-01-28 RX ADMIN — Medication 500 MILLIGRAM(S): at 05:22

## 2020-01-28 RX ADMIN — MEMANTINE HYDROCHLORIDE 10 MILLIGRAM(S): 10 TABLET ORAL at 05:21

## 2020-01-28 RX ADMIN — QUETIAPINE FUMARATE 25 MILLIGRAM(S): 200 TABLET, FILM COATED ORAL at 05:21

## 2020-01-28 RX ADMIN — Medication 500 MILLIGRAM(S): at 11:53

## 2020-01-28 RX ADMIN — LAMOTRIGINE 100 MILLIGRAM(S): 25 TABLET, ORALLY DISINTEGRATING ORAL at 05:21

## 2020-01-28 RX ADMIN — ESCITALOPRAM OXALATE 10 MILLIGRAM(S): 10 TABLET, FILM COATED ORAL at 11:53

## 2020-01-28 NOTE — DISCHARGE NOTE NURSING/CASE MANAGEMENT/SOCIAL WORK - PATIENT PORTAL LINK FT
You can access the FollowMyHealth Patient Portal offered by [facility] by registering at the following website: [email or web address]. By joining [employer]’s FollowMyHealth portal, you will also be able to view your health information using other applications (apps) compatible with our system.

## 2020-01-28 NOTE — DISCHARGE NOTE PROVIDER - NSDCCPCAREPLAN_GEN_ALL_CORE_FT
PRINCIPAL DISCHARGE DIAGNOSIS  Diagnosis: Anemia  Assessment and Plan of Treatment: s/p transfusion of 5 units of blood this admission  care as per hospice      SECONDARY DISCHARGE DIAGNOSES  Diagnosis: Cellulitis of upper extremity, unspecified laterality  Assessment and Plan of Treatment: please complete course of antibiotics   take over the counter probiotics while on antibiotics

## 2020-01-28 NOTE — DISCHARGE NOTE PROVIDER - PROVIDER TOKENS
FREE:[LAST:[hospice],PHONE:[(   )    -],FAX:[(   )    -],FOLLOWUP:[1-3 days]],FREE:[LAST:[primary medical doctor],PHONE:[(   )    -],FAX:[(   )    -],FOLLOWUP:[1 week]]

## 2020-01-28 NOTE — PROGRESS NOTE ADULT - SUBJECTIVE AND OBJECTIVE BOX
KDSONYA  69y, Female  Allergy: No Known Allergies      CHIEF COMPLAINT: anemia (27 Jan 2020 11:40)      INTERVAL EVENTS/HPI  - No acute events overnight  - T(F): , Max: 100.3 (01-27-20 @ 20:54)  - Denies any worsening symptoms  - Tolerating medication    ROS  UTO     SOCIAL HISTORY - not relevant     Substance Use (  ) never used  (  ) IVDU (  ) Other:  Tobacco Usage:  (   ) never smoked   (   ) former smoker   (   ) current smoker   Alcohol Usage: (   ) social  (   ) daily use (   ) denies  Sexual History:       FH noncontributory     VITALS:  T(F): 98.8, Max: 100.3 (01-27-20 @ 20:54)  HR: 75  BP: 148/99  RR: 18Vital Signs Last 24 Hrs  T(C): 37.1 (28 Jan 2020 05:14), Max: 37.9 (27 Jan 2020 20:54)  T(F): 98.8 (28 Jan 2020 05:14), Max: 100.3 (27 Jan 2020 20:54)  HR: 75 (28 Jan 2020 05:14) (64 - 75)  BP: 148/99 (28 Jan 2020 05:14) (140/76 - 148/99)  BP(mean): --  RR: 18 (27 Jan 2020 13:56) (18 - 18)  SpO2: --    PHYSICAL EXAM:  Gen: NAD, resting in bed  HEENT: Normocephalic, atraumatic  Neck: supple, no lymphadenopathy  CV:s1 s 2 +   Lungs: clear   Abdomen: Soft, BS present  Ext: Warm, well perfused. minimal RUE erythema   Neuro: non focal, awake  Skin: no rash, no erythema      TESTS & MEASUREMENTS:                        7.9    6.36  )-----------( 136      ( 27 Jan 2020 14:33 )             26.6     01-27    139  |  104  |  10  ----------------------------<  136<H>  4.6   |  22  |  0.8    Ca    9.0      27 Jan 2020 14:33    TPro  6.4  /  Alb  3.5  /  TBili  0.5  /  DBili  x   /  AST  22  /  ALT  7   /  AlkPhos  64  01-27    eGFR if Non African American: 75 mL/min/1.73M2 (01-27-20 @ 14:33)  eGFR if : 87 mL/min/1.73M2 (01-27-20 @ 14:33)    LIVER FUNCTIONS - ( 27 Jan 2020 14:33 )  Alb: 3.5 g/dL / Pro: 6.4 g/dL / ALK PHOS: 64 U/L / ALT: 7 U/L / AST: 22 U/L / GGT: x               Culture - Blood (collected 01-26-20 @ 06:55)  Source: .Blood None  Preliminary Report (01-27-20 @ 20:01):    No growth to date.    Culture - Urine (collected 01-25-20 @ 20:14)  Source: .Urine Clean Catch (Midstream)  Final Report (01-27-20 @ 07:03):    <10,000 CFU/mL Normal Urogenital Caitlin            INFECTIOUS DISEASES TESTING  Hepatitis C Virus Interpretation: Nonreact (03-14-19 @ 19:03)      RADIOLOGY & ADDITIONAL TESTS:      CARDIOLOGY TESTING      MEDICATIONS  cephalexin 500  chlorhexidine 4% Liquid 1  escitalopram 10  lamoTRIgine 100  memantine 10  QUEtiapine 25  simvastatin 20      ANTIBIOTICS:  cephalexin 500 milliGRAM(s) Oral every 6 hours

## 2020-01-28 NOTE — PROGRESS NOTE ADULT - PROBLEM SELECTOR PLAN 3
chronic issue   stable   mx per psych
continue home meds quetiapine, escitalopram
continue home meds quetiapine, escitalopram

## 2020-01-28 NOTE — DISCHARGE NOTE PROVIDER - CARE PROVIDER_API CALL
hospice,   Phone: (   )    -  Fax: (   )    -  Follow Up Time: 1-3 days    primary medical doctor,   Phone: (   )    -  Fax: (   )    -  Follow Up Time: 1 week

## 2020-01-28 NOTE — CHART NOTE - NSCHARTNOTEFT_GEN_A_CORE
Patient has very poor peripheral access with left arm precautions and reportedly (confirmed by RN) repeatedly pulled out IV's. Next use of IV is scheduled for 0600. Current conditions (poor lighting at night) makes attempt to replace futile. Will switch antibiotic  to oral keflex for now but reassess in am

## 2020-01-28 NOTE — DISCHARGE NOTE PROVIDER - HOSPITAL COURSE
Patient seen and examined this morning    Lying comfortably in bed.  In NAD. No complaints.    She is demented on a 1:1 sit.        A 70 yo F with PMHx of breast cancer, dementia, depression, seizures, h/o CVA x2 and Cerebral Aneurysm, anemia hgb 5.9 on 1/19 s/p  transfusion  at that time brought in for abnormal labs as per electronic medical records. No family members at bedside, Pt with dementia unable to provide history.  Pt does not answer  and keeps asking unrelated questions. Pt had EGD/colonoscopy on 1/2019 significant for duodenitis, non erosive gastritis and diverticulosis.         Hospital course complicated by the fact that the pt is demented and it was very hard to get her to agree to egd/s-scope for several days.  She was transfused 4 units PRBCS on admission for a hgb of 3.7    Eventually her daughter decided on hospice care and the pt was transfused one more unit on 1/27.    [facility] home care is in place. Hospice will go into the     home on 1/29.        Vital Signs Last 24 Hrs    T(C): 37.1 (28 Jan 2020 05:14), Max: 37.9 (27 Jan 2020 20:54)    T(F): 98.8 (28 Jan 2020 05:14), Max: 100.3 (27 Jan 2020 20:54)    HR: 75 (28 Jan 2020 05:14) (64 - 75)    BP: 148/99 (28 Jan 2020 05:14) (140/76 - 148/99)    BP(mean): --    RR: 18 (27 Jan 2020 13:56) (18 - 18)    SpO2: --        PHYSICAL EXAM:    GENERAL: NAD, well-groomed, well-developed    HEAD:  Atraumatic, Normocephalic    EYES: EOMI, PERRLA, conjunctiva and sclera clear    NERVOUS SYSTEM:  Alert & Oriented X 4, Good concentration; Motor Strength 5/5 B/L upper and lower extremities; DTRs 2+ intact and symmetric    CHEST/LUNG: Clear to percussion bilaterally; No rales, rhonchi, wheezing, or rubs    HEART: Regular rate and rhythm; No murmurs, rubs, or gallops    ABDOMEN: Soft, Nontender, Nondistended; Bowel sounds present; obese    EXTREMITIES:  2+ Peripheral Pulses, No clubbing, cyanosis. RUE hand edema and slight TTP    SKIN: No rashes or lesions

## 2020-01-28 NOTE — DISCHARGE NOTE PROVIDER - NSDCMRMEDTOKEN_GEN_ALL_CORE_FT
cephalexin 500 mg oral capsule: 1 cap(s) orally every 6 hours  docusate sodium 100 mg oral capsule: 1 cap(s) orally 2 times a day  escitalopram 10 mg oral tablet: 1 tab(s) orally once a day  ferrous sulfate 325 mg (65 mg elemental iron) oral tablet: 1 tab(s) orally every other day   lamoTRIgine 100 mg oral tablet: 1 tab(s) orally 2 times a day  Lasix 40 mg oral tablet: 1 tab(s) orally once a day  memantine 10 mg oral tablet: 1 tab(s) orally 2 times a day  pantoprazole 40 mg oral delayed release tablet: 1 tab(s) orally 2 times a day  QUEtiapine 25 mg oral tablet: 1 tab(s) orally 2 times a day  senna oral tablet: 2 tab(s) orally once a day (at bedtime), As Needed  simvastatin 20 mg oral tablet: 1 tab(s) orally once a day (at bedtime)

## 2020-01-29 NOTE — PROGRESS NOTE ADULT - PROVIDER SPECIALTY LIST ADULT
Critical lab result received. Troponin 0.06. Dr. Watkins aware. Will continue trending troponins q4h   Hospitalist

## 2020-01-31 LAB
CULTURE RESULTS: SIGNIFICANT CHANGE UP
SPECIMEN SOURCE: SIGNIFICANT CHANGE UP
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
## 2020-02-03 DIAGNOSIS — D62 ACUTE POSTHEMORRHAGIC ANEMIA: ICD-10-CM

## 2020-02-03 DIAGNOSIS — L03.113 CELLULITIS OF RIGHT UPPER LIMB: ICD-10-CM

## 2020-02-03 DIAGNOSIS — Z90.12 ACQUIRED ABSENCE OF LEFT BREAST AND NIPPLE: ICD-10-CM

## 2020-02-03 DIAGNOSIS — F32.9 MAJOR DEPRESSIVE DISORDER, SINGLE EPISODE, UNSPECIFIED: ICD-10-CM

## 2020-02-03 DIAGNOSIS — C50.919 MALIGNANT NEOPLASM OF UNSPECIFIED SITE OF UNSPECIFIED FEMALE BREAST: ICD-10-CM

## 2020-02-03 DIAGNOSIS — K57.90 DIVERTICULOSIS OF INTESTINE, PART UNSPECIFIED, WITHOUT PERFORATION OR ABSCESS WITHOUT BLEEDING: ICD-10-CM

## 2020-02-03 DIAGNOSIS — E66.01 MORBID (SEVERE) OBESITY DUE TO EXCESS CALORIES: ICD-10-CM

## 2020-02-03 DIAGNOSIS — E87.6 HYPOKALEMIA: ICD-10-CM

## 2020-02-03 DIAGNOSIS — Z66 DO NOT RESUSCITATE: ICD-10-CM

## 2020-02-03 DIAGNOSIS — D50.9 IRON DEFICIENCY ANEMIA, UNSPECIFIED: ICD-10-CM

## 2020-02-03 DIAGNOSIS — T80.818A EXTRAVASATION OF OTHER VESICANT AGENT, INITIAL ENCOUNTER: ICD-10-CM

## 2020-02-03 DIAGNOSIS — G40.909 EPILEPSY, UNSPECIFIED, NOT INTRACTABLE, WITHOUT STATUS EPILEPTICUS: ICD-10-CM

## 2020-02-03 DIAGNOSIS — Z86.73 PERSONAL HISTORY OF TRANSIENT ISCHEMIC ATTACK (TIA), AND CEREBRAL INFARCTION WITHOUT RESIDUAL DEFICITS: ICD-10-CM

## 2020-02-03 DIAGNOSIS — F03.90 UNSPECIFIED DEMENTIA WITHOUT BEHAVIORAL DISTURBANCE: ICD-10-CM

## 2020-02-03 DIAGNOSIS — D64.9 ANEMIA, UNSPECIFIED: ICD-10-CM

## 2020-02-14 NOTE — ED ADULT NURSE NOTE - NSSUHOSCREENINGYN_ED_ALL_ED
Render Post-Care Instructions In Note?: no Consent: The patient's consent was obtained including but not limited to risks of crusting, scabbing, blistering, scarring, darker or lighter pigmentary change, recurrence, incomplete removal and infection. Post-Care Instructions: I reviewed with the patient in detail post-care instructions. Patient is to wear sunprotection, and avoid picking at any of the treated lesions. Pt may apply Vaseline to crusted or scabbing areas. Medical Necessity Information: It is in your best interest to select a reason for this procedure from the list below. All of these items fulfill various CMS LCD requirements except the new and changing color options. Medical Necessity Clause: This procedure was medically necessary because the lesions that were: Number Of Freeze-Thaw Cycles: 2 freeze-thaw cycles Detail Level: Detailed No - the patient is unable to be screened due to medical condition

## 2021-08-05 NOTE — ED BEHAVIORAL HEALTH ASSESSMENT NOTE - NS ED [facility]
Rec'd handoff report from [facility] that pt and son Mani Pedraza was \"thinking\" about SNF placement. ERCM called and spoke with pt's son Mani Pedraza re: SNF placement.  Fortino Pedraza has been residing with patient and his 81 y/o father (pt's ) since 6/19 after it None known

## 2022-06-21 NOTE — H&P ADULT - HISTORY OF PRESENT ILLNESS
A 70 yo F with PMHx of breast cancer, dementia, depression, seizures, h/o CVA x2 and Cerebral Aneurysm, anemia hgb 5.9 on 1/19 s/p  transfusion  at that time brought in for abnormal labs as per electronic medical records. No family members at bedside, Pt with dementia unable to provide history.  Pt does not answer  and keeps asking unrelated questions. Pt had EGD/colonoscopy on 1/2019 significant for duodenitis, non erosive gastritis and diverticulosis.
done

## 2022-10-27 NOTE — PHYSICAL THERAPY INITIAL EVALUATION ADULT - PRECAUTIONS/LIMITATIONS, REHAB EVAL
TELEMEDICINE VIRTUAL VISIT  10/27/22  1:30 PM CDT    Visit Details: This visit was a telemedicine virtual visit with synchronous audio and video. Gina represented that her location at the time of this visit was in the [facility]. Gina chose and consented to receive these medical services by telemedicine.    CHIEF COMPLAINT: Follow-up and Medication Refill    ASSESSMENT & PLAN  1. Recurrent mild major depressive disorder with anxiety  Assessment & Plan:  Improved with bupropion, but she wants to try higher dose.    Orders:  -     clonazePAM (KLONOPIN) 0.5 MG tablet; Take 0.5-1 tablets (0.25-0.5 mg total) by mouth 2 (two) times daily as needed (for anxiety or insomnia). MAX 1.5 TABLETS PER DAY  Dispense: 45 tablet; Refill: 2  -     buPROPion (WELLBUTRIN XL) 300 MG 24 hr tablet; Take 1 tablet (300 mg total) by mouth once daily.  Dispense: 30 tablet; Refill: 2    2. Psychophysiologic insomnia  -     clonazePAM (KLONOPIN) 0.5 MG tablet; Take 0.5-1 tablets (0.25-0.5 mg total) by mouth 2 (two) times daily as needed (for anxiety or insomnia). MAX 1.5 TABLETS PER DAY  Dispense: 45 tablet; Refill: 2    3. MANJINDER (obstructive sleep apnea)  Assessment & Plan:  Recently started CPAP.      4. Vitamin D deficiency  Assessment & Plan:  Lab Results   Component Value Date    [id number] 21 (L) 11/11/2021       Orders:  -     vitamin D (VITAMIN D3) 1000 units Tab; Take 1 tablet (1,000 Units total) by mouth once daily.  Dispense: 90 tablet; Refill: 3  -     Vitamin D; Future; Expected date: 11/10/2022    5. Class 3 (severe) obesity due to excess calories with serious comorbidity and body mass index (BMI) of 40.0 to 44.9 in adult  -     Comprehensive Metabolic Panel; Future; Expected date: 11/10/2022  -     Lipid Panel; Future; Expected date: 11/10/2022  -     Hemoglobin A1C; Future; Expected date: 11/10/2022  -     Insulin, Random; Future; Expected date: 11/10/2022    6. Insulin resistance  -     Hemoglobin A1C; Future;  Expected date: 11/10/2022  -     Insulin, Random; Future; Expected date: 11/10/2022    7. Screening for lipid disorders  -     Lipid Panel; Future; Expected date: 11/10/2022    8. Screening for diabetes mellitus  -     Hemoglobin A1C; Future; Expected date: 11/10/2022    Unless noted herein, any chronic conditions are represented as and appear compensated/controlled and stable, and no other significant complaints or concerns were reported.    Follow up in about 5 weeks (around 12/3/2022) for review test results, discuss treatment plan, re-evaluate problem(s) discussed today.   Future Appointments   Date Time Provider Department Center   12/3/2022 10:30 AM KWAKU Perez MD [facility]   12/14/2022  9:00 AM Elizabeth Lejeune, NP [facility] SLEEP [facility]   12/16/2022  3:00 PM Chris Khan OD [facility]   1/6/2023  9:00 AM Riki Fitch LCSW [facility]     PRESCRIPTION DRUG MANAGEMENT  Outpatient Medications Prior to Visit   Medication Sig Dispense Refill    meclizine (ANTIVERT) 25 mg tablet Take 25 mg by mouth 3 (three) times daily as needed.      multivitamin (THERAGRAN) per tablet Take 1 tablet by mouth once daily.      buPROPion (WELLBUTRIN XL) 150 MG TB24 tablet Take 1 tablet (150 mg total) by mouth once daily. 30 tablet 2    clonazePAM (KLONOPIN) 0.5 MG tablet Take 0.5-1 tablets (0.25-0.5 mg total) by mouth 2 (two) times daily as needed (for anxiety or insomnia). MAX 1.5 TABLETS PER DAY 45 tablet 2     No facility-administered medications prior to visit.     Medications Discontinued During This Encounter   Medication Reason    buPROPion (WELLBUTRIN XL) 150 MG TB24 tablet Dose adjustment    clonazePAM (KLONOPIN) 0.5 MG tablet Reorder     Medications Ordered This Encounter   Medications    buPROPion (WELLBUTRIN XL) 300 MG 24 hr tablet     Sig: Take 1 tablet (300 mg total) by mouth once daily.     Dispense:  30 tablet     Refill:  2    clonazePAM (KLONOPIN) 0.5 MG tablet     Sig:  Take 0.5-1 tablets (0.25-0.5 mg total) by mouth 2 (two) times daily as needed (for anxiety or insomnia). MAX 1.5 TABLETS PER DAY     Dispense:  45 tablet     Refill:  2    vitamin D (VITAMIN D3) 1000 units Tab     Sig: Take 1 tablet (1,000 Units total) by mouth once daily.     Dispense:  90 tablet     Refill:  3     If insurance does not cover, please offer OTC equivalent. OK to vary dispense quantity to correspond with in-stock OTC unit quantities.     Review of Systems   Constitutional:  Negative for activity change, fever and unexpected weight change.   HENT:  Negative for hearing loss, rhinorrhea and trouble swallowing.    Eyes:  Negative for discharge.   Respiratory:  Negative for cough, chest tightness, shortness of breath and wheezing.    Cardiovascular:  Negative for chest pain and palpitations.   Gastrointestinal:  Negative for blood in stool, constipation and vomiting.   Endocrine: Negative for polydipsia and polyuria.   Genitourinary:  Negative for difficulty urinating, dysuria, hematuria and menstrual problem.   Musculoskeletal:  Positive for neck pain. Negative for arthralgias and joint swelling.   Psychiatric/Behavioral:  Positive for dysphoric mood and sleep disturbance. Negative for agitation and suicidal ideas. The patient is not hyperactive.    PHYSICAL EXAM  CONSTITUTIONAL: No apparent distress. Appears comfortable. Does not appear acutely ill or septic. Appears adequately hydrated.  PULMONARY: Breathing unlabored. No audible wheezes. Easily speaks in full sentences.  PSYCHIATRIC: Alert and conversant and grossly oriented. Mood is grossly neutral. Affect appropriate. Judgment and insight grossly intact.    Orders Placed This Encounter    Comprehensive Metabolic Panel    Lipid Panel    Hemoglobin A1C    Insulin, Random    Vitamin D    clonazePAM (KLONOPIN) 0.5 MG tablet    buPROPion (WELLBUTRIN XL) 300 MG 24 hr tablet    vitamin D (VITAMIN D3) 1000 units Tab     TOTAL TIME evaluating and managing  "this patient for this encounter was greater than or equal to 28 minutes. This time was spent personally by me on the following activities: review of patient's past medical history, assessing age-appropriate health maintenance needs, review of any interval history, medication reconciliation, reconciling/updating problem list, obtaining history from the patient, examination of the patient, review and interpretation of lab results, evaluation of the patient's response to treatment, review of [place] Board of Pharmacy Controlled Prescription Drug Monitoring database records for the patient, managing and/or ordering prescription medications, explaining rationale for medication change(s) and answering patient's questions, ordering labs, educating patient and answering their questions about treatment plan, goals of treatment, and follow-up, educating patient about needed immunizations, counseling on appropriate therapeutic lifestyle changes, and final documentation of the visit. This time was exclusive of any separately billable procedures for this patient and exclusive of time spent treating any other patients.    Documentation entered by me for this encounter may have been done in part using speech-recognition technology. Although I have made an effort to ensure accuracy, "sound like" errors may exist and should be interpreted in context.    Each patient to whom medical services are provided by telemedicine is: (1) informed of the relationship between the physician and patient and the respective role of any other health care provider with respect to management of the patient; and (2) notified that he or she may decline to receive medical services by telemedicine and may withdraw from such care at any time.   " fall precautions

## 2023-07-06 NOTE — CHART NOTE - NSCHARTNOTESELECT_GEN_ALL_CORE
Controlled Substance Refill Request for: Adderall IR 20mg, si tab PO QAM & 0.5 tab PO QPM PRN #[id number]  Last refill: 23  Last clinic visit: 23   Clinic visit frequency required: Q 6  months  Next appt: due in 2023-not scheduled at this time  Controlled substance agreement on file: Yes:  Date 23. tox screen done 23    [place]  checked, fill history below. Refill routed to Kasey Khanna CNP  for review. Melody Sotomayor        Event Note

## 2024-05-29 NOTE — PATIENT PROFILE ADULT - NSASFUNCLEVELADLTRANSFER_GEN_A_NUR
How Severe Is Your Dermatophytosis?: moderate
Is This A New Presentation, Or A Follow-Up?: Rash
0 = independent

## 2024-10-14 NOTE — PROGRESS NOTE ADULT - PROVIDER SPECIALTY LIST ADULT
Internal Medicine Communicate fall risk and risk factors to all staff, patient, and family/Provide visual cue: yellow wristband, yellow gown, etc/Reinforce activity limits and safety measures with patient and family/Call bell, personal items and telephone in reach/Instruct patient to call for assistance before getting out of bed/chair/stretcher/Non-slip footwear applied when patient is off stretcher/[place] to call system/Physically safe environment - no spills, clutter or unnecessary equipment/Purposeful Proactive Rounding/Room/bathroom lighting operational, light cord in reach